# Patient Record
Sex: MALE | Race: WHITE
[De-identification: names, ages, dates, MRNs, and addresses within clinical notes are randomized per-mention and may not be internally consistent; named-entity substitution may affect disease eponyms.]

---

## 2017-11-17 ENCOUNTER — HOSPITAL ENCOUNTER (INPATIENT)
Dept: HOSPITAL 10 - E/R | Age: 52
LOS: 4 days | Discharge: HOME | DRG: 871 | End: 2017-11-21
Attending: INTERNAL MEDICINE | Admitting: INTERNAL MEDICINE
Payer: COMMERCIAL

## 2017-11-17 VITALS — SYSTOLIC BLOOD PRESSURE: 136 MMHG | DIASTOLIC BLOOD PRESSURE: 83 MMHG | RESPIRATION RATE: 18 BRPM | HEART RATE: 112 BPM

## 2017-11-17 VITALS
WEIGHT: 140.3 LBS | HEIGHT: 66 IN | BODY MASS INDEX: 22.55 KG/M2 | BODY MASS INDEX: 22.55 KG/M2 | HEIGHT: 66 IN | WEIGHT: 140.3 LBS

## 2017-11-17 VITALS — DIASTOLIC BLOOD PRESSURE: 86 MMHG | RESPIRATION RATE: 20 BRPM | SYSTOLIC BLOOD PRESSURE: 135 MMHG

## 2017-11-17 VITALS — TEMPERATURE: 99.3 F

## 2017-11-17 VITALS — DIASTOLIC BLOOD PRESSURE: 61 MMHG | RESPIRATION RATE: 20 BRPM | SYSTOLIC BLOOD PRESSURE: 116 MMHG

## 2017-11-17 VITALS — HEART RATE: 107 BPM

## 2017-11-17 VITALS — HEART RATE: 103 BPM

## 2017-11-17 DIAGNOSIS — R11.2: ICD-10-CM

## 2017-11-17 DIAGNOSIS — N20.0: ICD-10-CM

## 2017-11-17 DIAGNOSIS — B97.89: ICD-10-CM

## 2017-11-17 DIAGNOSIS — Z86.19: ICD-10-CM

## 2017-11-17 DIAGNOSIS — A41.89: Primary | ICD-10-CM

## 2017-11-17 DIAGNOSIS — Z79.899: ICD-10-CM

## 2017-11-17 DIAGNOSIS — Z94.0: ICD-10-CM

## 2017-11-17 DIAGNOSIS — E83.42: ICD-10-CM

## 2017-11-17 DIAGNOSIS — N18.9: ICD-10-CM

## 2017-11-17 DIAGNOSIS — F41.9: ICD-10-CM

## 2017-11-17 DIAGNOSIS — N17.9: ICD-10-CM

## 2017-11-17 DIAGNOSIS — K57.90: ICD-10-CM

## 2017-11-17 DIAGNOSIS — J96.01: ICD-10-CM

## 2017-11-17 DIAGNOSIS — R65.20: ICD-10-CM

## 2017-11-17 DIAGNOSIS — Z94.83: ICD-10-CM

## 2017-11-17 DIAGNOSIS — K59.00: ICD-10-CM

## 2017-11-17 DIAGNOSIS — I12.9: ICD-10-CM

## 2017-11-17 DIAGNOSIS — I70.209: ICD-10-CM

## 2017-11-17 DIAGNOSIS — E87.2: ICD-10-CM

## 2017-11-17 LAB
ABNORMAL IP MESSAGE: 1
ADD UMIC: YES
ALBUMIN SERPL-MCNC: 4.6 G/DL (ref 3.3–4.9)
ALBUMIN/GLOB SERPL: 1.64 {RATIO}
ALP SERPL-CCNC: 72 IU/L (ref 42–121)
ALT SERPL-CCNC: 18 IU/L (ref 13–69)
ANION GAP SERPL CALC-SCNC: 26 MMOL/L (ref 8–16)
APTT BLD: 27.1 SEC (ref 25–35)
AST SERPL-CCNC: 24 IU/L (ref 15–46)
BASOPHILS # BLD AUTO: 0.1 10^3/UL (ref 0–0.1)
BASOPHILS NFR BLD: 0.3 % (ref 0–2)
BILIRUB DIRECT SERPL-MCNC: 0 MG/DL (ref 0–0.2)
BILIRUB SERPL-MCNC: 0.6 MG/DL (ref 0.2–1.3)
BUN SERPL-MCNC: 33 MG/DL (ref 7–20)
CALCIUM SERPL-MCNC: 12.1 MG/DL (ref 8.4–10.2)
CHLORIDE SERPL-SCNC: 96 MMOL/L (ref 97–110)
CO2 SERPL-SCNC: 22 MMOL/L (ref 21–31)
COLOR UR: (no result)
CREAT SERPL-MCNC: 3.97 MG/DL (ref 0.61–1.24)
EOSINOPHIL # BLD: 0 10^3/UL (ref 0–0.5)
EOSINOPHIL NFR BLD: 0 % (ref 0–7)
ERYTHROCYTE [DISTWIDTH] IN BLOOD BY AUTOMATED COUNT: 13.4 % (ref 11.5–14.5)
GLOBULIN SER-MCNC: 2.8 G/DL (ref 1.3–3.2)
GLUCOSE SERPL-MCNC: 144 MG/DL (ref 70–220)
GLUCOSE UR STRIP-MCNC: (no result) MG/DL
HCT VFR BLD CALC: 44.7 % (ref 42–52)
HGB BLD-MCNC: 15.8 G/DL (ref 14–18)
INR PPP: 1.18
KETONES UR STRIP.AUTO-MCNC: (no result) MG/DL
LYMPHOCYTES # BLD AUTO: 2.3 10^3/UL (ref 0.8–2.9)
LYMPHOCYTES NFR BLD AUTO: 10.7 % (ref 15–51)
MCH RBC QN AUTO: 32.2 PG (ref 29–33)
MCHC RBC AUTO-ENTMCNC: 35.3 G/DL (ref 32–37)
MCV RBC AUTO: 91 FL (ref 82–101)
MONOCYTES # BLD: 1.9 10^3/UL (ref 0.3–0.9)
MONOCYTES NFR BLD: 8.8 % (ref 0–11)
NEUTROPHILS # BLD: 16.8 10^3/UL (ref 1.6–7.5)
NEUTROPHILS NFR BLD AUTO: 79.7 % (ref 39–77)
NITRITE UR QL STRIP.AUTO: NEGATIVE MG/DL
NRBC # BLD MANUAL: 0 10^3/UL (ref 0–0)
NRBC BLD AUTO-RTO: 0 /100WBC (ref 0–0)
PLATELET # BLD: 287 10^3/UL (ref 140–415)
PMV BLD AUTO: 11.5 FL (ref 7.4–10.4)
POSITIVE DIFF: (no result)
POTASSIUM SERPL-SCNC: 3.8 MMOL/L (ref 3.5–5.1)
PROT SERPL-MCNC: 7.4 G/DL (ref 6.1–8.1)
PROT UR-MCNC: 30 MG/DL (ref 0–11.9)
PROTHROMBIN TIME: 15.1 SEC (ref 12.2–14.2)
PT RATIO: 1.2
RBC # BLD AUTO: 4.91 10^6/UL (ref 4.7–6.1)
RBC # UR AUTO: NEGATIVE MG/DL
SODIUM SERPL-SCNC: 140 MMOL/L (ref 135–144)
TROPONIN I SERPL-MCNC: 0.01 NG/ML (ref 0–0.12)
UR ASCORBIC ACID: NEGATIVE MG/DL
UR BILIRUBIN (DIP): (no result) MG/DL
UR CLARITY: (no result)
UR PH (DIP): 5 (ref 5–9)
UR RBC: 15 /HPF (ref 0–5)
UR SPECIFIC GRAVITY (DIP): 1.02 (ref 1–1.03)
UR TOTAL PROTEIN (DIP): (no result) MG/DL
UROBILINOGEN UR STRIP-ACNC: (no result) MG/DL
WBC # BLD AUTO: 21.1 10^3/UL (ref 4.8–10.8)
WBC # UR STRIP: NEGATIVE LEU/UL

## 2017-11-17 PROCEDURE — 82043 UR ALBUMIN QUANTITATIVE: CPT

## 2017-11-17 PROCEDURE — 84155 ASSAY OF PROTEIN SERUM: CPT

## 2017-11-17 PROCEDURE — 83690 ASSAY OF LIPASE: CPT

## 2017-11-17 PROCEDURE — 83605 ASSAY OF LACTIC ACID: CPT

## 2017-11-17 PROCEDURE — 90686 IIV4 VACC NO PRSV 0.5 ML IM: CPT

## 2017-11-17 PROCEDURE — C9113 INJ PANTOPRAZOLE SODIUM, VIA: HCPCS

## 2017-11-17 PROCEDURE — 85610 PROTHROMBIN TIME: CPT

## 2017-11-17 PROCEDURE — 84300 ASSAY OF URINE SODIUM: CPT

## 2017-11-17 PROCEDURE — 93005 ELECTROCARDIOGRAM TRACING: CPT

## 2017-11-17 PROCEDURE — 36415 COLL VENOUS BLD VENIPUNCTURE: CPT

## 2017-11-17 PROCEDURE — 84443 ASSAY THYROID STIM HORMONE: CPT

## 2017-11-17 PROCEDURE — 80048 BASIC METABOLIC PNL TOTAL CA: CPT

## 2017-11-17 PROCEDURE — 80197 ASSAY OF TACROLIMUS: CPT

## 2017-11-17 PROCEDURE — 80053 COMPREHEN METABOLIC PANEL: CPT

## 2017-11-17 PROCEDURE — 84484 ASSAY OF TROPONIN QUANT: CPT

## 2017-11-17 PROCEDURE — 85025 COMPLETE CBC W/AUTO DIFF WBC: CPT

## 2017-11-17 PROCEDURE — 82550 ASSAY OF CK (CPK): CPT

## 2017-11-17 PROCEDURE — 80061 LIPID PANEL: CPT

## 2017-11-17 PROCEDURE — 84100 ASSAY OF PHOSPHORUS: CPT

## 2017-11-17 PROCEDURE — 87400 INFLUENZA A/B EACH AG IA: CPT

## 2017-11-17 PROCEDURE — 84439 ASSAY OF FREE THYROXINE: CPT

## 2017-11-17 PROCEDURE — 85730 THROMBOPLASTIN TIME PARTIAL: CPT

## 2017-11-17 PROCEDURE — 74176 CT ABD & PELVIS W/O CONTRAST: CPT

## 2017-11-17 PROCEDURE — 81001 URINALYSIS AUTO W/SCOPE: CPT

## 2017-11-17 PROCEDURE — 83735 ASSAY OF MAGNESIUM: CPT

## 2017-11-17 PROCEDURE — 71010: CPT

## 2017-11-17 PROCEDURE — 81003 URINALYSIS AUTO W/O SCOPE: CPT

## 2017-11-17 PROCEDURE — 96374 THER/PROPH/DIAG INJ IV PUSH: CPT

## 2017-11-17 PROCEDURE — 87086 URINE CULTURE/COLONY COUNT: CPT

## 2017-11-17 PROCEDURE — 96375 TX/PRO/DX INJ NEW DRUG ADDON: CPT

## 2017-11-17 PROCEDURE — 84145 PROCALCITONIN (PCT): CPT

## 2017-11-17 PROCEDURE — 93306 TTE W/DOPPLER COMPLETE: CPT

## 2017-11-17 PROCEDURE — 85378 FIBRIN DEGRADE SEMIQUANT: CPT

## 2017-11-17 PROCEDURE — 82553 CREATINE MB FRACTION: CPT

## 2017-11-17 PROCEDURE — 86644 CMV ANTIBODY: CPT

## 2017-11-17 PROCEDURE — 87040 BLOOD CULTURE FOR BACTERIA: CPT

## 2017-11-17 RX ADMIN — CEFEPIME SCH MLS/HR: 1 INJECTION, POWDER, FOR SOLUTION INTRAMUSCULAR; INTRAVENOUS at 21:52

## 2017-11-17 RX ADMIN — FOLIC ACID SCH MLS/HR: 5 INJECTION, SOLUTION INTRAMUSCULAR; INTRAVENOUS; SUBCUTANEOUS at 22:00

## 2017-11-17 RX ADMIN — ALPRAZOLAM PRN MG: 1 TABLET ORAL at 19:54

## 2017-11-17 RX ADMIN — MYCOPHENOLIC ACID SCH MG: 180 TABLET, DELAYED RELEASE ORAL at 22:54

## 2017-11-17 RX ADMIN — TACROLIMUS SCH MG: 1 CAPSULE ORAL at 22:54

## 2017-11-17 NOTE — ERD
ER Documentation


Chief Complaint


Chief Complaint


DIZZINESS, WEAKNESS, & LOSS OF APPETITIE X2 DAYS





HPI


Patient is a 52-year-old male with kidney and pancreas transplant and diabetes 

who presents with dizziness and weakness.  He was brought in by ambulance.  He 

said that he has had the symptoms for the past 2 days.  He almost passed out 

but did not pass out.  He was given normal saline 500 mL bolus and Zofran by 

paramedics.  He had low blood pressure for the paramedics.  He was having whole 

body chills.  He does not know if he had a fever.  He has had nausea and 

vomiting.  He was taking his antirejection medicines until 24 hours ago when he 

could not keep anything down.  He has had cough and decreased urination.  His 

primary doctor is Dr. Freed.





ROS


All systems reviewed and are negative except as per history of present illness.





Medications


Home Meds


Active Scripts


Ondansetron Hcl* (Zofran* ODT) 4 mg -ODT Tab.disper, 4 MG PO Q6 Y for NAUSEA AND

/OR VOMITING, #20 TAB


   Prov:YAMILET RICHARDSON         5/26/16


Lorazepam* (Lorazepam*) 1 Mg Tablet, 1 MG PO Q8H Y for ANXIETY, #20 TAB


   Prov:YAMILET RICHARDSON         5/26/16


Reported Medications


Alprazolam* (Alprazolam*) 1 Mg Tablet, 1 MG PO Q8 Y for ANXIETY, TAB


   5/25/16


Metoprolol Tartrate* (Lopressor*) 50 Mg Tab, 50 MG PO BID, #60 TAB


   5/25/16


Mycophenolate Sodium* (Myfortic*) 360 Mg Tablet., 360 MG PO Q12, TAB


   2/18/15


Tacrolimus* (Tacrolimus*) 1 Mg Capsule, 2 MG PO Q12, CAP


   2/18/15


Prednisone (Prednisone) 5 Mg Tablet, 5 MG PO DAILY


   10/16/11





Allergies


Allergies:  


Coded Allergies:  


     No Known Allergies (Verified  Allergy, Unknown, 5/25/16)





PMhx/Soc


History of Surgery:  Yes (RT KIDNEY & PANCREAS TRANSPLANT 17 YRS AGO)


Anesthesia Reaction:  No


Hx Neurological Disorder:  No


Hx Respiratory Disorders:  No


Hx Cardiac Disorders:  Yes (AFIB)


Hx Psychiatric Problems:  No


Hx Miscellaneous Medical Probl:  Yes (DM)


Hx Alcohol Use:  No


Hx Substance Use:  No


Hx Tobacco Use:  No





FmHx


Family History:  No diabetes





Physical Exam


Vitals





Vital Signs








  Date Time  Temp Pulse Resp B/P Pulse Ox O2 Delivery O2 Flow Rate FiO2


 


11/17/17 11:15 98.1 99 24 118/81 100 Nasal Cannula 2.0 


 


11/17/17 08:40 98.7 104 24 105/75 100   


 


11/17/17 08:40      Nasal Cannula 2 








Physical Exam


Const: Moderate distress with rigors


Head:   Atraumatic 


Eyes:    Normal Conjunctiva


ENT:    Normal External Ears, Nose and Mouth.


Neck:               Full range of motion..~ No meningismus.


Resp:    Clear to auscultation bilaterally


Cardio:    Regular rate and rhythm, no murmurs


Abd:    Soft, non tender, non distended. Normal bowel sounds


Skin:   Pale skin


Back:    No midline or flank tenderness


Ext:    No cyanosis, or edema


Neur:    Awake and alert, patient has full body rigors


Result Diagram:  


11/17/17 0900                                                                  

              11/17/17 0900





Results 24 hrs





 Laboratory Tests








Test


  11/17/17


09:00 11/17/17


11:20


 


White Blood Count 21.110^3/ul  


 


Red Blood Count 4.9110^6/ul  


 


Hemoglobin 15.8g/dl  


 


Hematocrit 44.7%  


 


Mean Corpuscular Volume 91.0fl  


 


Mean Corpuscular Hemoglobin 32.2pg  


 


Mean Corpuscular Hemoglobin


Concent 35.3g/dl 


  


 


 


Red Cell Distribution Width 13.4%  


 


Platelet Count 43705^3/UL  


 


Mean Platelet Volume 11.5fl  


 


Neutrophils % 79.7%  


 


Lymphocytes % 10.7%  


 


Monocytes % 8.8%  


 


Eosinophils % 0.0%  


 


Basophils % 0.3%  


 


Nucleated Red Blood Cells % 0.0/100WBC  


 


Neutrophils # 16.810^3/ul  


 


Lymphocytes # 2.310^3/ul  


 


Monocytes # 1.910^3/ul  


 


Eosinophils # 0.010^3/ul  


 


Basophils # 0.110^3/ul  


 


Nucleated Red Blood Cells # 0.010^3/ul  


 


Sodium Level 140mmol/L  


 


Potassium Level 3.8mmol/L  


 


Chloride Level 96mmol/L  


 


Carbon Dioxide Level 22mmol/L  


 


Anion Gap 26  


 


Blood Urea Nitrogen 33mg/dl  


 


Creatinine 3.97mg/dl  


 


Glucose Level 144mg/dl  


 


Lactic Acid Level 5.0mmol/L  4.8mmol/L 


 


Calcium Level 12.1mg/dl  


 


Total Bilirubin 0.6mg/dl  


 


Direct Bilirubin 0.00mg/dl  


 


Indirect Bilirubin 0.6mg/dl  


 


Aspartate Amino Transf


(AST/SGOT) 24IU/L 


  


 


 


Alanine Aminotransferase


(ALT/SGPT) 18IU/L 


  


 


 


Alkaline Phosphatase 72IU/L  


 


Troponin I 0.012ng/ml  


 


Total Protein 7.4g/dl  


 


Albumin 4.6g/dl  


 


Globulin 2.80g/dl  


 


Albumin/Globulin Ratio 1.64  


 


Lipase 33U/L  


 


Prothrombin Time  15.1Sec 


 


Prothrombin Time Ratio  1.2 


 


INR International Normalized


Ratio 


  1.18 


 


 


Activated Partial


Thromboplast Time 


  27.1Sec 


 








 Current Medications








 Medications


  (Trade)  Dose


 Ordered  Sig/Emily


 Route


 PRN Reason  Start Time


 Stop Time Status Last Admin


Dose Admin


 


 Cefepime HCl  50 ml @ 


 100 mls/hr  ONCE  STAT


 IVPB


   11/17/17 08:40


 11/17/17 09:09 DC 11/17/17 10:00


 


 


 Vancomycin HCl  250 ml @ 


 125 mls/hr  ONCE  ONCE


 IVPB


   11/17/17 09:00


 11/17/17 10:59 DC 11/17/17 10:59


 


 


 Sodium Chloride  1,000 ml @ 


 1,000 mls/hr  Q1H STAT


 IV


   11/17/17 08:40


 11/17/17 09:39 DC 11/17/17 09:49


 


 


 Sodium Chloride


  (NS)  1,000 ml @ 


 1,000 mls/hr  Q1H STAT


 IV


   11/17/17 08:40


 11/17/17 09:39 DC 11/17/17 10:58


 


 


 Ondansetron HCl


  (Zofran Inj)  4 mg  ER BRIDGE PRN


 IV


 NAUSEA AND/OR VOMITING  11/17/17 10:30


 11/18/17 10:29  11/17/17 10:58


 


 


 Acetaminophen


  (Tylenol Tab)  650 mg  ER BRIDGE PRN


 PO


 MILD PAIN/FEVER  11/17/17 10:30


 11/18/17 10:29   


 


 


 Ondansetron HCl


  (Zofran Inj)  4 mg  ONCE  STAT


 IV


   11/17/17 10:30


 11/17/17 10:31 DC 11/17/17 10:53


 


 


 Lorazepam


  (Ativan)  1 mg  ONCE  ONCE


 IV


   11/17/17 11:00


 11/17/17 11:01 DC 11/17/17 10:52


 


 


 Alprazolam


  (Xanax)  1 mg  Q8  PRN


 PO


 ANXIETY  11/17/17 11:30


     


 


 


 Lorazepam


  (Ativan)  1 mg  Q8H  PRN


 PO


 ANXIETY  11/17/17 11:30


   UNV  


 


 


 Metoprolol


 Tartrate


  (Lopressor)  50 mg  BID


 PO


   11/17/17 21:00


 11/17/17 21:00 DC  


 


 


 Mycophenolate


 Sodium


  (Myfortic)  360 mg  Q12


 PO


   11/17/17 21:00


     


 


 


 Ondansetron HCl


  (Zofran Odt)  4 mg  Q6  PRN


 ODT


 NAUSEA AND/OR VOMITING  11/17/17 11:30


     


 


 


 Prednisone


  (Prednisone)  5 mg  DAILY


 PO


   11/18/17 09:00


     


 


 


 Tacrolimus 2 mg  2 mg  Q12


 PO


   11/17/17 21:00


     


 


 


 Sodium Chloride  1,000 ml @ 


 100 mls/hr  Q10H


 IV


   11/17/17 12:00


     


 


 


 Cefepime HCl  50 ml @ 


 100 mls/hr  BID


 IVPB


   11/17/17 21:00


     


 


 


 Sodium Chloride


  (NS)  1,000 ml @ 


 100 mls/hr  Q10H


 IV


   11/17/17 12:00


     


 


 


 Metoclopramide HCl


  (Reglan)  10 mg  STK-MED ONCE


 .ROUTE


   11/17/17 12:45


 11/17/17 12:46 DC  


 


 


 Metoclopramide HCl


  (Reglan)  10 mg  ONCE  ONCE


 IV


   11/17/17 13:00


 11/17/17 13:01 DC  


 


 


 Haloperidol


  (Haldol)  2 mg  ONCE  ONCE


 IV


   11/17/17 13:00


 11/17/17 13:01 DC  


 











Procedures/MDM


EKG read by me: 


Rate/Rhythm: Regular rate and rhythm at a rate of 85


Intervals:    Normal


Impression:     ST depressions diffusely





Chest x-ray negative per radiology.





Admit MDM:


Patient's infectious symptoms have not stabilized and the patient is at risk of 

rapid decompensation.  The patient will be admitted for careful hydration, 

antibiotic therapy, and infectious source control.





Severe Sepsis criteria: 


Infectious source:   Likely cystitis


End organ damage indicated by: 


Lactic acid greater than 2 and creatinine showing acute renal failure





Sepsis Management:


Time of recognition of sepsis: Upon arrival





Within 3 hours of recognition:


Blood cultures x 2 before broad-spectrum antibiotics:   Yes


30 ml/kg NS bolus    Completed


Initial lactate      5.0


Repeat lactate     4.8





Time of recognition of septic shock: 9 AM





Septic Shock Assessment:


Any lactic acid > 4.0   yes


Persistent hypotension (SBP < 90 or 40 mmHg drop, MAP < 65) despite 30 mL/kg IV 

fluid bolus No





Volume Re-assessment for Septic Shock (post 30 ml/kg bolus):


Temp 98.1, /81, HR 99, RR 24, Pox 100% on room air


Heart      Regular rate & rhythm


Lungs      No crackles


Skin      Warm & dry


Cap Refill                   Less than 2 seconds


Peripheral pulses   Radially present





Persistent Hypotension Treatment:


Comfort care   No


Central line   Not Required


Vasopressor started   Not required


I considered further perfusion assessment with CVP measurement, SCVO2, bedside 

ultrasound volume assessment, passive leg raise, trial of further fluid bolus.


And proceeded with 30 ml/kg fluid bolus of NSS, broad spectrum antibiotics, and 

admission.  The patient also has kidney and pancreas transplant and given the 

elevated creatinine I am concerned about potential rejection.  Dr. Freed the 

patient's primary doctor is come to the bedside.  The patient was given fluid 

and broad-spectrum antibiotics and hopefully his creatinine will improve.





Accepting Care Team


Current data and ongoing care discussed.


Admitting Physician:   Dr. Freed the patient's primary doctor


Consultant(s):   None


Outstanding Data:   Culture results





Critical Care:


Critical care time   50 minutes excluding all billable procedures


Emergent fluid management while maintaining close respiratory support.  

Provision of immediate and broad-spectrum antibiotic therapy.  Simultaneous 

assessment for possible sources in order to direct targeted therapy.  

Consideration for invasive and chemical support to prevent cardiopulmonary 

collapse.





Departure


Diagnosis:  


 Primary Impression:  


 Dizziness


 Additional Impressions:  


 Septic shock


 Renal failure


 Renal failure chronicity:  acute  Acute renal failure type:  unspecified  

Qualified Code:  N17.9 - Acute renal failure, unspecified acute renal failure 

type


Condition:  Serious











LIVAN FRANKS MD Nov 17, 2017 13:14

## 2017-11-17 NOTE — HP
DATE OF ADMISSION: 11/17/2017

 

CHIEF COMPLAINT:  Fever, rigors, nausea, vomiting.

 

HISTORY OF PRESENT ILLNESS:  This is a 52-year-old male with a past medical history of simultaneous 
renal and pancreatic transplant in 2000 with a baseline creatinine of 1.3 to 1.4 mg/dL.  The patient
 is followed by myself in my office.  Most recent creatinine was approximately 1.4 mg/dL approximate
ly 2 months ago.  The patient also has a history of CMV virus, a history of cyclic vomiting syndrome
 who presents to [facility] with several days of fevers, rigors, nausea, vomiting.
  The patient stated the symptoms began approximately 4 to 5 days ago when he started having progres
sive weakness, chills.  The patient during this time was also having progressive nausea and vomiting
.  He stated his symptoms did not improve with his usual medical management of Zofran as well as can
nabis.  The patient stated yesterday his symptoms progressively got worse.  He was unable to tolerat
e any p.o. including his immunosuppressive medications.  As a result, the patient was brought into [facility] emergency room for evaluation.  Upon arrival, the patient had laboratory data which showed a whit
e count of 21,000.  The patient had a sodium 140, BUN is 33, creatinine 3.97.  Lactic acid 5.0.  Naomi
st x-ray was obtained which showed no acute pathology.  In the emergency room, the patient was diagn
osed with severe sepsis.  He was started on sepsis protocol.  He was given IV hydration, IV fluids, 
IV antibiotics of vancomycin and Zosyn.  There have been no other acute processes noted.

 

In terms of the patient's renal history, as stated above, the patient has history of simultaneous pa
ncreatic and renal transplant 17 years ago.  The patient's baseline renal function and creatinine is
 around 1.4 mg/dL.  The patient previously did have a complication of CMV virus 1 year postinfectiou
s but that was treated with IV Valcyte without any further recurrence CMV.  The patient's renal func
tion is otherwise stable without any further complications.  There has been no further reports of an
y hemoptysis, hematemesis, hematochezia.

 

PAST MEDICAL HISTORY:  As stated above, history of simultaneous pancreatic and kidney transplant in 
2000 and previous history of acute kidney injury, previous history of diabetes, previous history of 
CMV virus, history of pneumonia, history of hypertension.

 

PAST SURGICAL HISTORY:  Status post pancreatic and kidney transplant.

 

ALLERGIES:  NO KNOWN DRUG ALLERGIES.

 

SOCIAL HISTORY:  Does not drink, smoke or do drugs.

 

FAMILY HISTORY:  Noncontributory.

 

HOME MEDICATIONS:  Include:

1.  Prednisone 5 mg daily.

2.  Prograf 2 mg b.i.d. 

3.  _____ 360 p.o. q. 12 hours.

4.  Aspirin.  

5.  Xanax.

6.  Metoprolol.

 

REVIEW OF SYSTEMS:  A 14-point review of systems was conducted.  Pertinent positives stated in HPI, 
otherwise negative.

 

PHYSICAL EXAMINATION:

VITAL SIGNS:  Blood pressure is 118/81, respirations 24, pulse 99, temperature 98.1.

HEENT:  Head is normocephalic.  Pupils are reactive to light.

NECK:  Supple.

HEART:  Regular rate.

LUNGS:  Show diminished breath sounds at base.

ABDOMEN:  Soft, nontender to palpation.  No rebound or guarding.

EXTREMITIES:  Negative for clubbing, cyanosis, no edema.

DERMATOLOGIC:  No rashes.

MUSCULOSKELETAL:  No joint effusions.

NEUROLOGIC:  No focal deficits.

 

LABORATORY DATA:  Shows sodium 140, potassium _____, chloride 96, BUN 33, creatinine 3.97, calcium 1
2.1.  Lactic acid 5.0, white count 21.1, hemoglobin 15.8 and platelet count 287.

 

IMAGING STUDIES:  As stated in HPI.

 

ASSESSMENT AND PLAN:  This is a 52-year-old male who presents with:

1.  Severe sepsis.  Underlying etiology source is unclear, possibly pneumonic, possibly abdominal, p
ossible viral, that is, flu.  Plan at this point is to check a CT scan of abdomen and pelvis.  The p
atient's chest x-ray shows no acute pathology at this time.  We will follow up blood cultures.  Foll
ow up with influenza titers.  We will continue the patient on broad-spectrum antibiotics with vancom
ycin and Zosyn.  Continue aggressive IV hydration.  Will check lactic acid and procalcitonin level. 
 We will place an ID consult for evaluation and monitor.

2.  Acute respiratory failure.  Etiology is likely due to underlying sepsis.  Patient's chest x-ray 
shows no acute findings.  Continue supplemental oxygen.  Continue nebulizers.  Monitor closely.  Con
 pulmonary consult.

3.  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
inine of 1.4 mg/dL.  Etiology of acute kidney injury is likely secondary to hemodynamics, sepsis.  T
he possibility of acute rejection is always a consideration, although less likely given the acute pr
esentation.  Plan at this point is to check a UA with microanalysis.  We will check urine electrolyt
es.  We will check a renal ultrasound of the patient's allograft.  We will check a Prograf level.  W
e will continue current immunosuppressive regimen, will likely need to adjust Prograf dosing.  We wi
ll continue to treat underlying sepsis.  Continue IV hydration, monitor closely.

4.  Status post simultaneous pancreatic and kidney transplant with a baseline creatinine of 1.4 mg/d
L.  Patient is currently in acute kidney injury as stated above.  We will continue current medical m
anagement.

5.  History of chronic nausea and vomiting.  Continue Zofran.  Continue Ativan p.r.n.

6.  Anxiety disorder.  Continue Xanax.

7.  History of hypertension.  Patient is currently hypotensive.  We will hold beta blocker, monitor.

8.  Gastrointestinal and deep venous thrombosis prophylaxis.  We will give proton pump inhibitor and
 Lovenox.

 

Please note I spent over 25 minutes of face-to-face time with the patient.  The patient is FULL CODE
.

 

 

Dictated By: JACKIE MARIO/BIANCA

DD:    11/17/2017 11:43:55

DT:    11/17/2017 12:20:16

Conf#: [id number]

DID#:  [id number]

## 2017-11-17 NOTE — CONS
DATE OF ADMISSION: 11/17/2017

DATE OF CONSULTATION:  11/17/2017

 

 

 

TYPE OF CONSULTATION:  Infectious Disease.

 

REASON FOR CONSULTATION:  Antibiotic management.

 

HISTORY OF PRESENT ILLNESS:  Jasvir Hopper is a 52-year-old male who comes in with fever, rigors, nause
a and vomiting.  Mr. Hopper is a 52-year-old male with numerous problems who comes in for antibiotic
 management.  His problems include:

1.  History of simultaneous pancreatic and kidney transplant in 2000.

2.  Previous history of acute kidney injury.

3.  Previous history of diabetes mellitus.

4.  History of CMV virus.

5.  History of pneumonia.

6.  Hypertension.

 

Acutely, the patient comes in with a creatinine of 1.3-1.4.  He has a history of cyclic vomiting syn
drome.  He comes in now with fever, rigors, nausea and vomiting which began 4 to 5 days prior to adm
ission.  He stated his symptoms did not improve with Zofran as well as cannabis.  He was unable to t
olerate any oral medication including immunosuppressive medication.  In the emergency room, his whit
e count was elevated at 21,100, hemoglobin 15.8, platelet count 286,000.  BUN and creatinine up to 3
3/3.97.  Lactic acid was 5.0.  Chest x-ray showed no acute pathology.  He was diagnosed with severe 
sepsis, given IV hydration and started on vancomycin and Zosyn.

 

PAST SURGICAL HISTORY:  As outlined.

 

PAST SURGICAL HISTORY:  Status post pancreatic and kidney transplant.

 

FAMILY HISTORY:  Noncontributory.

 

SOCIAL HISTORY:  He does not smoke, drink or abuse drugs.

 

ALLERGIES:  NONE TO PENICILLIN, SULFA OR FOODS.

 

MEDICATIONS:  Include:

1.  Prednisone 5 mg a day.

2.  Prograf 2 mg per day.

 

REVIEW OF SYSTEMS:  Noncontributory.

 

PHYSICAL EXAMINATION:

GENERAL:  The patient is a well-developed, well-nourished male, alert, responsive, in no acute distr
ess.

VITAL SIGNS:  Stable.  He is afebrile.

SKIN:  Without generalized rash.

HEENT:  Within normal limits.

NECK:  Supple.

LYMPH NODES:  None palpable.

CHEST:  Decreased breath sounds at the bases.

HEART:  Without murmur or gallop.

ABDOMEN:  Soft, nontender, without organosplenomegaly or masses.

EXTREMITIES:  Without cyanosis, clubbing, or edema.

RECTAL AND GENITAL:  Exams deferred.

NEUROLOGIC:  No focal neurological abnormalities.

 

IMPRESSION AND PLAN:  The patient comes in with severe sepsis.  A chest x-ray shows no acute disease
.  His lactic acid as noted was 5 and then 4.8.  Liver function tests are within normal limits.  Neno
l have to check his urine, rule out urinary tract infection and a procalcitonin level was ordered an
d ID consult was requested.  

 

I will dictate my findings to Dr. Bell.  I want to thank him for asking us to see this martinez gen
tae in consultation.

 

 

Dictated By: JERROLD DREYER MD JD/BIANCA

DD:    11/17/2017 13:09:44

DT:    11/17/2017 13:59:10

Conf#: [id number]

DID#:  [id number]

CC: JACKIE BELL DO;*EndCC*

## 2017-11-17 NOTE — RADRPT
PROCEDURE:   XR Chest. 

 

CLINICAL INDICATION:    chest pain

 

TECHNIQUE:   Single frontal view of the chest was obtained 

 

COMPARISON:   CR CHEST 5/25/2016 

 

FINDINGS:

The heart and mediastinum are within normal limits.  

The lungs are clear.

There is no pleural effusion or pneumothorax.   

 

RPTAT: AA

 

IMPRESSION:

No acute disease.

_____________________________________________ 

.Cam Gleason MD, MD           Date    Time 

Electronically viewed and signed by .Cam Gleason MD, MD on 11/17/2017 09:26 

 

D:  11/17/2017 09:26  T:  11/17/2017 09:26

.S/

## 2017-11-17 NOTE — CONS
Date/Time of Note


Date/Time of Note


DATE: 11/17/17 


TIME: 12:21





Assessment/Plan


Assessment/Plan


Chief Complaint/Hosp Course


1. abnormal ECG


2. Sepsis


3. lactic acidosis


4. hx renal transplant


5. hx pancreatic transplant


6. hx DM: cured by transplant


7/ hx PAFIB


8. HX HTN





Recommendations:


IV fluid and antibiotic management as per internal medicine.


Cultures to be sent from the emergency room.


Aspirin and beta-blocker to be continued as long as her blood pressure allows 

for his.


We will repeat the cardiac enzymes tomorrow again.  Echocardiogram will be 

checked as well.


Adjustment of antirejection medication as per internal medicine/nephrology team.








Thank you for his referral.  I will continue to follow along with you.





GARIMA DILLON MD [facility]


Problems:  





Consultation Date/Type/Reason


Admit Date/Time





Date of Consultation:  Nov 17, 2017


Type of Consultation:  cardiology


Reason for Consultation


abnormal ECG


Referring Provider:  JACKIE BELL DO





Hx of Present Illness


cc: weakness, N/V








HPI:


Thank you for his referral.  This is a pleasant 52-year-old gentleman with 

history of pancreatic and renal transplant about 17 years ago who presented to 

emergency above complaint.  Patient says over the past year he has been weak 

having nausea vomiting.  Her generalized pain and discomfort including 

bilateral chest wall.  He denies any palpitation to me.  He has not been able 

to eat well.  His EKG has showed marked ST abnormalities which was currently 

asked to evaluate and treat.  His old record was reviewed.  He has had a 

Lexiscan stress test done about 3 years ago at that time he was normal 

perfusion noted.


He had he appears to have severe lactic acidosis and possibly sepsis.





Allergies: No known drug allergies


Medications per medical reconciliation sheet which was personally reviewed and 

includes Toprol and aspirin plus antirejection medications.


Social history he has not smoked.


Family history denies any early coronary artery disease


Review of system as above mentioned he denies all other.





Exam/Review of Systems


Vital Signs


Vitals





 Vital Signs








  Date Time  Temp Pulse Resp B/P Pulse Ox O2 Delivery O2 Flow Rate FiO2


 


11/17/17 11:15 98.1 99 24 118/81 100 Nasal Cannula 2.0 











Exam


General: appears in distress. 


HEENT: NC/AT. pupils are equal. round. 


NECK: NO JVD. no stridor. 


CV: tachycardic. systolic murmur; no gallop or rubs. 


PULM: no wheezing or rhonchi. 


GI: SOFT,  ND, no rebound or guarding 


Extremity: trace B/L LE edema. no clubbing. 


neuro: awake and alert, OX3. 


Psych: calm and pleasant 


rectal: deferred 


Derm: multiple tattoos








ECG reviewed sinus tachy marked ST T abn c/w ant and inf/lat ischemia. 


CXR reviewed. 





lexiscan 4/24/14:


1.  No evidence of perfusion defects or wall motion abnormalities.


2.  The left ventricle ejection fraction at stress is  57%.


 








Results


Result Diagram:  


11/17/17 0900                                                                  

              11/17/17 0900





Results 24 hrs





Laboratory Tests








Test


  11/17/17


09:00 11/17/17


11:20


 


White Blood Count 21.1  H 


 


Red Blood Count 4.91   


 


Hemoglobin 15.8   


 


Hematocrit 44.7   


 


Mean Corpuscular Volume 91.0   


 


Mean Corpuscular Hemoglobin 32.2   


 


Mean Corpuscular Hemoglobin


Concent 35.3  


  


 


 


Red Cell Distribution Width 13.4   


 


Platelet Count 287   


 


Mean Platelet Volume 11.5  #H 


 


Neutrophils % 79.7  H 


 


Lymphocytes % 10.7  L 


 


Monocytes % 8.8   


 


Eosinophils % 0.0   


 


Basophils % 0.3   


 


Nucleated Red Blood Cells % 0.0   


 


Neutrophils # 16.8  H 


 


Lymphocytes # 2.3   


 


Monocytes # 1.9  H 


 


Eosinophils # 0.0   


 


Basophils # 0.1   


 


Nucleated Red Blood Cells # 0.0   


 


Sodium Level 140   


 


Potassium Level 3.8   


 


Chloride Level 96  L 


 


Carbon Dioxide Level 22   


 


Anion Gap 26  H 


 


Blood Urea Nitrogen 33  H 


 


Creatinine 3.97  H 


 


Glucose Level 144   


 


Lactic Acid Level 5.0  *H 4.8  *H


 


Calcium Level 12.1  H 


 


Total Bilirubin 0.6   


 


Direct Bilirubin 0.00   


 


Indirect Bilirubin 0.6   


 


Aspartate Amino Transf


(AST/SGOT) 24  


  


 


 


Alanine Aminotransferase


(ALT/SGPT) 18  


  


 


 


Alkaline Phosphatase 72   


 


Troponin I 0.012   


 


Total Protein 7.4   


 


Albumin 4.6   


 


Globulin 2.80   


 


Albumin/Globulin Ratio 1.64   


 


Lipase 33   


 


Prothrombin Time  15.1  H


 


Prothrombin Time Ratio  1.2  


 


INR International Normalized


Ratio 


  1.18  


 


 


Activated Partial


Thromboplast Time 


  27.1  


 











Medications


Medications





 Current Medications


Alprazolam (Xanax) 1 mg Q8  PRN PO ANXIETY;  Start 11/17/17 at 11:30


Mycophenolate Sodium (Myfortic) 360 mg Q12 PO ;  Start 11/17/17 at 21:00


Ondansetron HCl (Zofran Odt) 4 mg Q6  PRN ODT NAUSEA AND/OR VOMITING;  Start 11/ 17/17 at 11:30


Prednisone (Prednisone) 5 mg DAILY PO ;  Start 11/18/17 at 09:00


Tacrolimus 2 mg 2 mg Q12 PO ;  Start 11/17/17 at 21:00


Sodium Chloride 1,000 ml @  100 mls/hr Q10H IV ;  Start 11/17/17 at 12:00


Cefepime HCl 50 ml @  100 mls/hr BID IVPB ;  Start 11/17/17 at 21:00


Sodium Chloride (NS) 1,000 ml @  100 mls/hr Q10H IV ;  Start 11/17/17 at 12:00











GARIMA DILLON MD Nov 17, 2017 12:29


Medications





 Current Medications


Alprazolam (Xanax) 1 mg Q8  PRN PO ANXIETY;  Start 11/17/17 at 11:30


Mycophenolate Sodium (Myfortic) 360 mg Q12 PO ;  Start 11/17/17 at 21:00


Ondansetron HCl (Zofran Odt) 4 mg Q6  PRN ODT NAUSEA AND/OR VOMITING;  Start 11/ 17/17 at 11:30


Prednisone (Prednisone) 5 mg DAILY PO ;  Start 11/18/17 at 09:00


Tacrolimus 2 mg 2 mg Q12 PO ;  Start 11/17/17 at 21:00


Sodium Chloride 1,000 ml @  100 mls/hr Q10H IV ;  Start 11/17/17 at 12:00


Cefepime HCl 50 ml @  100 mls/hr BID IVPB ;  Start 11/17/17 at 21:00


Sodium Chloride (NS) 1,000 ml @  100 mls/hr Q10H IV ;  Start 11/17/17 at 12:00











GARIMA DILLON MD Nov 17, 2017 12:29

## 2017-11-17 NOTE — RADRPT
PROCEDURE:   CT Abdomen and Pelvis without contrast. 

 

CLINICAL INDICATION:    Lower abdominal pain. 

 

TECHNIQUE:   CT scan of the abdomen and pelvis without contrast was performed on a multidetector hig
h-resolution CT scanner. The patient was scanned without intravenous contrast.  Coronal and sagittal
 reformatted images were obtained from the axial source images. Images were reviewed on a high-resol
wizboo PACS workstation. The total exam CTDI equals 7.32 mGy and the total exam DLP equals 426.57 mGy
-cm.

One or the following dose reduction techniques were used:

-Automated exposure control.

-Adjustment of the mA and/or KV according to patient's size.

-Use of iterative reconstruction technique.

DICOM images are available.

 

COMPARISON:   CT abdomen pelvis 05/26/2016. 

 

FINDINGS:

 

Lung Bases: Unremarkable.

GI:. Unremarkable.

Liver: Unremarkable.

Gallbladder: Unremarkable.

Pancreas: Unremarkable.

Spleen: Unremarkablel

Adrenals: Unremarkable.

Kidneys: Kidneys are severely atrophic with medical cortical thinning and bilateral cysts consistent
 with severe medical renal disease. There are bilateral nonobstructing calculi similar appearance to
 the prior study. There is a pelvic renal allograft present without evidence of urolithiasis or hydr
onephrosis.



Bladder: Unremarkable.

Pelvic Organs: Moderate sized, enlarging scrotal hydrocele.

Skeleton: Normal for age.

Other: Moderate atherosclerotic calcifications.

 

IMPRESSION:

 

1. Small native kidneys with bilateral nonobstructing renal calculi with a pelvic renal allograft wi
thout evidence of urolithiasis or obstructive uropathy.

2.  Scattered colonic diverticulosis without evidence of acute diverticulitis.

3.  Moderate scattered fecal residue suggesting constipation.

4.  Slight increase in size of a scrotal hydrocele.

5.  Atherosclerotic calcifications.

 

 

 

 

RPTAT: AACC

_____________________________________________ 

Physician Denae           Date    Time 

Electronically viewed and signed by Physician Denae on 11/17/2017 16:43 

 

D:  11/17/2017 16:43  T:  11/17/2017 16:43

MARCELLO/

## 2017-11-18 VITALS — HEART RATE: 91 BPM

## 2017-11-18 VITALS — RESPIRATION RATE: 24 BRPM | SYSTOLIC BLOOD PRESSURE: 134 MMHG | DIASTOLIC BLOOD PRESSURE: 82 MMHG

## 2017-11-18 VITALS — RESPIRATION RATE: 20 BRPM | DIASTOLIC BLOOD PRESSURE: 64 MMHG | SYSTOLIC BLOOD PRESSURE: 121 MMHG

## 2017-11-18 VITALS — HEART RATE: 86 BPM

## 2017-11-18 VITALS — HEART RATE: 104 BPM

## 2017-11-18 VITALS — SYSTOLIC BLOOD PRESSURE: 138 MMHG | DIASTOLIC BLOOD PRESSURE: 91 MMHG | RESPIRATION RATE: 20 BRPM

## 2017-11-18 VITALS — RESPIRATION RATE: 20 BRPM | SYSTOLIC BLOOD PRESSURE: 130 MMHG | DIASTOLIC BLOOD PRESSURE: 72 MMHG

## 2017-11-18 VITALS — HEART RATE: 108 BPM

## 2017-11-18 VITALS — HEART RATE: 81 BPM

## 2017-11-18 VITALS — DIASTOLIC BLOOD PRESSURE: 89 MMHG | RESPIRATION RATE: 20 BRPM | SYSTOLIC BLOOD PRESSURE: 179 MMHG

## 2017-11-18 VITALS — HEART RATE: 100 BPM

## 2017-11-18 VITALS — DIASTOLIC BLOOD PRESSURE: 84 MMHG | RESPIRATION RATE: 20 BRPM | SYSTOLIC BLOOD PRESSURE: 145 MMHG

## 2017-11-18 VITALS — HEART RATE: 105 BPM

## 2017-11-18 LAB
ALBUMIN SERPL-MCNC: 3.3 G/DL (ref 3.3–4.9)
ALBUMIN/GLOB SERPL: 1.13 {RATIO}
ALP SERPL-CCNC: 53 IU/L (ref 42–121)
ALT SERPL-CCNC: 27 IU/L (ref 13–69)
ANION GAP SERPL CALC-SCNC: 13 MMOL/L (ref 8–16)
AST SERPL-CCNC: 29 IU/L (ref 15–46)
BASOPHILS # BLD AUTO: 0 10^3/UL (ref 0–0.1)
BASOPHILS NFR BLD: 0.3 % (ref 0–2)
BILIRUB DIRECT SERPL-MCNC: 0 MG/DL (ref 0–0.2)
BILIRUB SERPL-MCNC: 0.7 MG/DL (ref 0.2–1.3)
BUN SERPL-MCNC: 29 MG/DL (ref 7–20)
CALCIUM SERPL-MCNC: 9.7 MG/DL (ref 8.4–10.2)
CHLORIDE SERPL-SCNC: 107 MMOL/L (ref 97–110)
CHOLEST SERPL-MCNC: 130 MG/DL (ref 100–200)
CHOLEST/HDLC SERPL: 3.3 RATIO
CK MB SERPL-MCNC: 0.97 NG/ML (ref 0–2.4)
CK SERPL-CCNC: 107 IU/L (ref 23–200)
CO2 SERPL-SCNC: 26 MMOL/L (ref 21–31)
CREAT SERPL-MCNC: 1.99 MG/DL (ref 0.61–1.24)
EOSINOPHIL # BLD: 0 10^3/UL (ref 0–0.5)
EOSINOPHIL NFR BLD: 0.1 % (ref 0–7)
ERYTHROCYTE [DISTWIDTH] IN BLOOD BY AUTOMATED COUNT: 13.7 % (ref 11.5–14.5)
GLOBULIN SER-MCNC: 2.9 G/DL (ref 1.3–3.2)
GLUCOSE SERPL-MCNC: 98 MG/DL (ref 70–220)
HCT VFR BLD CALC: 41.1 % (ref 42–52)
HDLC SERPL-MCNC: 39 MG/DL (ref 28–71)
HGB BLD-MCNC: 14 G/DL (ref 14–18)
LYMPHOCYTES # BLD AUTO: 1.9 10^3/UL (ref 0.8–2.9)
LYMPHOCYTES NFR BLD AUTO: 12.8 % (ref 15–51)
MAGNESIUM SERPL-MCNC: 1.6 MG/DL (ref 1.7–2.5)
MCH RBC QN AUTO: 31.7 PG (ref 29–33)
MCHC RBC AUTO-ENTMCNC: 34.1 G/DL (ref 32–37)
MCV RBC AUTO: 93 FL (ref 82–101)
MONOCYTES # BLD: 1.4 10^3/UL (ref 0.3–0.9)
MONOCYTES NFR BLD: 9.5 % (ref 0–11)
NEUTROPHILS # BLD: 11.7 10^3/UL (ref 1.6–7.5)
NEUTROPHILS NFR BLD AUTO: 76.8 % (ref 39–77)
NRBC # BLD MANUAL: 0 10^3/UL (ref 0–0)
NRBC BLD AUTO-RTO: 0 /100WBC (ref 0–0)
PHOSPHATE SERPL-MCNC: 3.4 MG/DL (ref 2.5–4.9)
PLATELET # BLD: 232 10^3/UL (ref 140–415)
PMV BLD AUTO: 11.1 FL (ref 7.4–10.4)
POTASSIUM SERPL-SCNC: 3.5 MMOL/L (ref 3.5–5.1)
PROT SERPL-MCNC: 6.2 G/DL (ref 6.1–8.1)
RBC # BLD AUTO: 4.42 10^6/UL (ref 4.7–6.1)
SODIUM SERPL-SCNC: 142 MMOL/L (ref 135–144)
TRIGL SERPL-MCNC: 144 MG/DL (ref 0–149)
TROPONIN I SERPL-MCNC: 0.02 NG/ML (ref 0–0.12)
TSH SERPL-ACNC: 2.21 MIU/L (ref 0.47–4.68)
WBC # BLD AUTO: 15.2 10^3/UL (ref 4.8–10.8)

## 2017-11-18 RX ADMIN — FOLIC ACID SCH MLS/HR: 5 INJECTION, SOLUTION INTRAMUSCULAR; INTRAVENOUS; SUBCUTANEOUS at 08:00

## 2017-11-18 RX ADMIN — MYCOPHENOLIC ACID SCH MG: 180 TABLET, DELAYED RELEASE ORAL at 09:48

## 2017-11-18 RX ADMIN — FOLIC ACID SCH MLS/HR: 5 INJECTION, SOLUTION INTRAMUSCULAR; INTRAVENOUS; SUBCUTANEOUS at 18:00

## 2017-11-18 RX ADMIN — FOLIC ACID SCH MLS/HR: 5 INJECTION, SOLUTION INTRAMUSCULAR; INTRAVENOUS; SUBCUTANEOUS at 05:52

## 2017-11-18 RX ADMIN — CEFEPIME SCH MLS/HR: 1 INJECTION, POWDER, FOR SOLUTION INTRAMUSCULAR; INTRAVENOUS at 09:48

## 2017-11-18 RX ADMIN — ALPRAZOLAM PRN MG: 1 TABLET ORAL at 04:02

## 2017-11-18 RX ADMIN — PANTOPRAZOLE SODIUM SCH MG: 40 INJECTION, POWDER, FOR SOLUTION INTRAVENOUS at 20:29

## 2017-11-18 RX ADMIN — MYCOPHENOLIC ACID SCH MG: 180 TABLET, DELAYED RELEASE ORAL at 20:35

## 2017-11-18 RX ADMIN — ALPRAZOLAM PRN MG: 1 TABLET ORAL at 20:34

## 2017-11-18 RX ADMIN — ALPRAZOLAM PRN MG: 1 TABLET ORAL at 11:47

## 2017-11-18 RX ADMIN — TACROLIMUS SCH MG: 1 CAPSULE ORAL at 20:34

## 2017-11-18 RX ADMIN — TACROLIMUS SCH MG: 1 CAPSULE ORAL at 09:47

## 2017-11-18 RX ADMIN — FOLIC ACID SCH MLS/HR: 5 INJECTION, SOLUTION INTRAMUSCULAR; INTRAVENOUS; SUBCUTANEOUS at 07:48

## 2017-11-18 RX ADMIN — FOLIC ACID SCH MLS/HR: 5 INJECTION, SOLUTION INTRAMUSCULAR; INTRAVENOUS; SUBCUTANEOUS at 07:49

## 2017-11-18 RX ADMIN — METOPROLOL TARTRATE SCH MG: 25 TABLET, FILM COATED ORAL at 20:35

## 2017-11-18 RX ADMIN — CEFEPIME SCH MLS/HR: 1 INJECTION, POWDER, FOR SOLUTION INTRAMUSCULAR; INTRAVENOUS at 20:29

## 2017-11-18 NOTE — PN
Date/Time of Note


Date/Time of Note


DATE: 11/18/17 


TIME: 09:45





Assessment/Plan


VTE Prophylaxis


VTE Prophylaxis Intervention:  other





Lines/Catheters


IV Catheter Type (from [facility]):  Saline Lock


Urinary Cath still in place:  No





Assessment/Plan


Chief Complaint/Hosp Course


This is a 52-year-old male with a past medical history of simultaneous renal 

and pancreatic transplant in 2000 with a baseline creatinine of 1.3 to 1.4 mg/

dL.  Most recent creatinine was approximately 1.4 mg/dL approximately 2 months 

ago.  The patient also has a history of CMV virus, a history of cyclic vomiting 

syndrome who presents to [facility] with several days of 

fevers, rigors, nausea, vomiting.  The patient during this time was also having 

progressive nausea and vomiting.  He stated his symptoms did not improve with 

his usual medical management of Zofran as well as cannabis.  The patient stated 

yesterday his symptoms progressively got worse.  He was unable to tolerate any 

p.o. including his immunosuppressive medications.  As a result, the patient was 

brought into the emergency room for evaluation.  Upon arrival, the patient had 

laboratory data which showed a white count of 21,000.  The patient had a sodium 

140, BUN is 33, creatinine 3.97.  Lactic acid 5.0.  Chest x-ray was obtained 

which showed no acute pathology.  In the emergency room, the patient was 

diagnosed with possible sepsis.  He was started on sepsis protocol.  He was 

given IV hydration, IV fluids, IV antibiotics of vancomycin and Zosyn.  There 

have been no other acute processes noted.


 


In terms of the patient's renal history, as stated above, the patient has 

history of simultaneous pancreatic and renal transplant 17 years ago.  The 

patient's baseline renal function and creatinine is around 1.4 mg/dL.  The 

patient previously did have a complication of CMV virus 1 year postinfectious 

but that was treated with IV Valcyte without any further recurrence CMV.  The 

patient's renal function is otherwise stable without any further complications.

  There has been no further reports of any hemoptysis, hematemesis, 

hematochezia.





his kidney allograft function is improving


good uop


very anxious  


 


REVIEW OF SYSTEMS:  A 14-point review of systems was conducted.  Pertinent 

positives stated in HPI, otherwise negative.


 


PHYSICAL EXAMINATION:


HEENT:  Head is normocephalic.  Pupils are reactive to light.


NECK:  Supple.


HEART:  Regular rate.


LUNGS:  Show diminished breath sounds at base.


ABDOMEN:  Soft, nontender to palpation.  No rebound or guarding.


EXTREMITIES:  Negative for clubbing, cyanosis, no edema.


DERMATOLOGIC:  No rashes.


MUSCULOSKELETAL:  No joint effusions.


NEUROLOGIC:  No focal deficits.


 


 


ASSESSMENT AND PLAN:  This is a 52-year-old male who presents with:


1. possible sepsis in an immunocompromised patient.  Underlying etiology source 

is unclear.   We will follow up blood cultures.    We will continue the patient 

on broad-spectrum antibiotics with vancomycin and Zosyn.  Continue aggressive 

IV hydration.  


2.  Acute respiratory failure.  resolved 


3.  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 

previous baseline creatinine of 1.4 mg/dL.  Etiology of acute kidney injury is 

likely secondary to hemodynamics, sepsis.  improving.  We will continue current 

immunosuppressive regimen, will likely need to adjust Prograf dosing.  


4.  Status post simultaneous pancreatic and kidney transplant with a baseline 

creatinine of 1.4 mg/dL.  Patient is currently in acute kidney injury as stated 

above.  We will continue current medical management.


5.  History of chronic nausea and vomiting.  Continue Zofran.  Continue Ativan 

p.r.n.


6.  Anxiety disorder.  Continue Xanax.


7.  History of hypertension.  Patient is currently hypotensive.  We will hold 

beta blocker, monitor.


8.  Gastrointestinal and deep venous thrombosis prophylaxis.  We will give 

proton pump inhibitor and Lovenox.


Problems:  





Exam/Review of Systems


Vital Signs


Vitals





 Vital Signs








  Date Time  Temp Pulse Resp B/P Pulse Ox O2 Delivery O2 Flow Rate FiO2


 


11/18/17 08:30  86      


 


11/18/17 07:41 98.7  20 130/72 98   


 


11/17/17 20:00      Nasal Cannula 2.0 














 Intake and Output   


 


 11/17/17 11/17/17 11/18/17





 15:00 23:00 07:00


 


Intake Total 2300 ml 500 ml 250 ml


 


Balance 2300 ml 500 ml 250 ml











Results


Result Diagram:  


11/18/17 0748                                                                  

              11/18/17 0748





Results 24 hrs





Laboratory Tests








Test


  11/17/17


11:20 11/17/17


15:05 11/17/17


16:30 11/18/17


07:48


 


Prothrombin Time 15.1  H   


 


Prothrombin Time Ratio 1.2     


 


INR International Normalized


Ratio 1.18  


  


  


  


 


 


Activated Partial


Thromboplast Time 27.1  


  


  


  


 


 


Lactic Acid Level 4.8  *H 1.8    


 


Urine Color   LEXUS   


 


Urine Clarity


  


  


  SLIGHTLY


CLOUDY  A 


 


 


Urine pH   5.0   


 


Urine Specific Gravity   1.021   


 


Urine Ketones   TRACE  A 


 


Urine Nitrite   NEGATIVE   


 


Urine Bilirubin   1+  H 


 


Urine Urobilinogen   1+  H 


 


Urine Leukocyte Esterase   NEGATIVE   


 


Urine Microscopic RBC   15  H 


 


Urine Microscopic WBC   0   


 


Urine Hemoglobin   NEGATIVE   


 


Urine Random Creatinine   333.11   


 


Urine Random Sodium   < 13  L 


 


Urine Glucose   1+  H 


 


Urine Total Protein   30.0  H 


 


White Blood Count    15.2  #H


 


Red Blood Count    4.42  L


 


Hemoglobin    14.0  


 


Hematocrit    41.1  L


 


Mean Corpuscular Volume    93.0  


 


Mean Corpuscular Hemoglobin    31.7  


 


Mean Corpuscular Hemoglobin


Concent 


  


  


  34.1  


 


 


Red Cell Distribution Width    13.7  


 


Platelet Count    232  


 


Mean Platelet Volume    11.1  H


 


Neutrophils %    76.8  


 


Lymphocytes %    12.8  L


 


Monocytes %    9.5  


 


Eosinophils %    0.1  


 


Basophils %    0.3  


 


Nucleated Red Blood Cells %    0.0  


 


Neutrophils #    11.7  H


 


Lymphocytes #    1.9  


 


Monocytes #    1.4  H


 


Eosinophils #    0.0  


 


Basophils #    0.0  


 


Nucleated Red Blood Cells #    0.0  


 


Sodium Level    142  


 


Potassium Level    3.5  


 


Chloride Level    107  #


 


Carbon Dioxide Level    26  


 


Anion Gap    13  #


 


Blood Urea Nitrogen    29  H


 


Creatinine    1.99  #H


 


Glucose Level    98  #


 


Calcium Level    9.7  


 


Phosphorus Level    3.4  


 


Magnesium Level    1.6  L


 


Total Bilirubin    0.7  


 


Direct Bilirubin    0.00  


 


Indirect Bilirubin    0.7  


 


Aspartate Amino Transf


(AST/SGOT) 


  


  


  29  


 


 


Alanine Aminotransferase


(ALT/SGPT) 


  


  


  27  


 


 


Alkaline Phosphatase    53  


 


Total Protein    6.2  #


 


Albumin    3.3  #


 


Globulin    2.90  


 


Albumin/Globulin Ratio    1.13  


 


Triglycerides Level    144  


 


Cholesterol Level    130  


 


LDL Cholesterol, Calculated    62  


 


HDL Cholesterol    39  


 


Cholesterol/HDL Ratio    3.3  


 


Thyroid Stimulating Hormone


(TSH) 


  


  


  2.210  


 














Test


  11/18/17


07:50 


  


  


 


 


Creatine Kinase 107     


 


Creatine Kinase Index 0.9     


 


Creatinine Kinase MB (Mass) 0.97     


 


Troponin I 0.018     











Medications


Medications





 Current Medications


Alprazolam (Xanax) 1 mg Q8  PRN PO ANXIETY Last administered on 11/18/17at 04:02

; Admin Dose 1 MG;  Start 11/17/17 at 11:30


Mycophenolate Sodium (Myfortic) 360 mg Q12 PO  Last administered on 11/17/17at 

22:54; Admin Dose 360 MG;  Start 11/17/17 at 21:00


Ondansetron HCl (Zofran Odt) 4 mg Q6  PRN ODT NAUSEA AND/OR VOMITING;  Start 11/ 17/17 at 11:30


Prednisone (Prednisone) 5 mg DAILY PO ;  Start 11/18/17 at 09:00


Tacrolimus 2 mg 2 mg Q12 PO  Last administered on 11/17/17at 22:54; Admin Dose 

2 MG;  Start 11/17/17 at 21:00


Sodium Chloride 1,000 ml @  100 mls/hr Q10H IV ;  Start 11/17/17 at 12:00


Cefepime HCl 50 ml @  100 mls/hr BID IVPB  Last administered on 11/17/17at 21:52

; Admin Dose 100 MLS/HR;  Start 11/17/17 at 21:00


Sodium Chloride (NS) 1,000 ml @  100 mls/hr Q10H IV  Last administered on 11/18/ 17at 05:52; Admin Dose 100 MLS/HR;  Start 11/17/17 at 12:00











MAXI WINTERS DO Nov 18, 2017 09:48

## 2017-11-18 NOTE — CONS
Date/Time of Note


Date/Time of Note


DATE: 11/18/17 


TIME: 11:54





Assessment/Plan


Assessment/Plan


Chief Complaint/Hosp Course


ID PROGRESS NOTE


CURRENT ABX: DAY #2 =>Vanco x 1 on 11/17 + Cefepime #2


24H INTERVAL SUMMARY


* Slender 53 yo M standing up next to bed holding IV pole reports he soiled 

himself while on his way to [facility], he has been ill w/fevers and weak, feels SOB 

with 100% OS sat on room air, tells me he has a dry cough w/intermittent GERD 

emesis into mouth w/coughing in addition to pain in his chest located right 

paraseptal subclavicular  -- suspected esophagitis w/GERD. 


* CHART REVIEWED: See vitals, labs as per below.


* MICRO REVIEWED: 11/17 BCx (-); Urine Cx (-); (-) Influenza A/B screen 


* 11/18/17 2D ECHO: NL LVF/size- EF 65%, mild cLVH w/STG I DD, Mild mitral 

leaflet/annual ca++/Trace MR, Mild AVR, Mild TR, ~PA 31 mmHg, 





PHYSICAL EXAMINATION:


GENERAL:  VSS, afebrile, mild to moderate distress 


HEENT: Unremarkable 


NECK:  Supple, trach midline


CHEST:  Equal chest rise bilaterally, without dyspnea on observation 


HEART: RRR 


ABDOMEN:  Soft, NT, ND 


EXT: Warm, no edemia


SKIN: No rash, no diaphoresis, (+)Tattoos 





ID ASSESSMENT:


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 

immunosuppressives admit [facility]:


1.  Severe sepsis w/lactic acidosis on admission @ 5 -> 4.8 -> 1.8, 

leukocytosis 21.8 w/increased Neuts%, TMax 99.6, Tachycardia  => work up 

in process 


* Underlying etiology source is unclear => Dx viral syndrome vs pulmonary vs 

esophagitis ? 


* Hx of chronic recurrent nausea/vomiting, vs GERD emesis w/coughing 


* 11/17 BCx (-); Urine Cx (-); (-) Influenza A/B screen 


2.  Acute respiratory failure requiring supplemental O2, now on room air w/

subjective c/o dyspnea without wheeze 


* Patient's chest x-ray shows no acute findings.  Continue supplemental oxygen 

PRN.  Continue nebulizers. 


*  Feels SOB with 100% OS sat on room air 


3.  Atypical chest pain -> w/subjective dyspnea => DDx GERD emesis w/ 

esophagitis ?


* (+)Dry cough w/intermittent GERD emesis into mouth w/coughing in addition to 

pain in his chest located right paraseptal subclavicular  -- suspected 

esophagitis w/GERD?


* Lexiscan MPI 4/24/14:1.  No evidence of perfusion defects or wall motion 

abnormalities. 2.  The left ventricle ejection fraction at stress is  57%.


4. 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 

previous baseline creatinine of 1.4 mg/dL.  


* Etiology of acute kidney injury is likely secondary to hemodynamics, sepsis. 


* The possibility of acute rejection is always a consideration, although less 

likely given the acute presentation.  


5.  Nephrolithiasis = non-obstructing per CT: Small native kidneys with 

bilateral nonobstructing renal calculi with a pelvic renal allograft without 

evidence of urolithiasis or obstructive uropathy.


6.  Scattered colonic diverticulosis without evidence of acute diverticulitis.


7.  Constipation


8.  Peripheral atherosclerotic calcifications.


9.  HTN -> w/hypotension on admission


10. Hx of Atrial fibrillation  ? 


* ECG reviewed sinus tachy marked ST T abn c/w ant and inf/lat ischemia. 


11. Diabetes Mellitus


12.  Scrotal hydrocele.


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 

without any further recurrence CMV


14. Anxiety disorder.  Continue Xanax.


15. IMMUNOCOMPROMISED HOST: 2/2 Diabetes Mellitus + Immunosuppressive meds 


(  )MRSA


(-) Influenza A/B Screen 


ABX ALLERGIES: None to ABX 


INVASIVES: PIV 


CURRENT ABX: DAY #2 =>Vanco x 1 on 11/17 + Cefepime #2


ID RECOMMENDATIONS/PLAN:  


1. Continue current ABX over the weekend


2. ID team consultants will continue to follow





. 





Problems:  





Consultation Date/Type/Reason


Admit Date/Time


Nov 17, 2017 at 10:23


Initial Consult Date


11/17/17


Referring Provider:  JACKIE BELL DO





Exam/Review of Systems


Vital Signs


Vitals





 Vital Signs








  Date Time  Temp Pulse Resp B/P Pulse Ox O2 Delivery O2 Flow Rate FiO2


 


11/18/17 11:08 98.4 106 20 145/84 100   


 


11/18/17 09:20       2.0 


 


11/17/17 20:00      Nasal Cannula  














 Intake and Output   


 


 11/17/17 11/17/17 11/18/17





 15:00 23:00 07:00


 


Intake Total 2300 ml 500 ml 250 ml


 


Balance 2300 ml 500 ml 250 ml











Results


Result Diagram:  


11/18/17 0748                                                                  

              11/18/17 0748





Results 24 hrs





Laboratory Tests








Test


  11/17/17


15:05 11/17/17


16:30 11/18/17


07:48 11/18/17


07:50


 


Lactic Acid Level 1.8     


 


Urine Color  LEXUS    


 


Urine Clarity


  


  SLIGHTLY


CLOUDY  A 


  


 


 


Urine pH  5.0    


 


Urine Specific Gravity  1.021    


 


Urine Ketones  TRACE  A  


 


Urine Nitrite  NEGATIVE    


 


Urine Bilirubin  1+  H  


 


Urine Urobilinogen  1+  H  


 


Urine Leukocyte Esterase  NEGATIVE    


 


Urine Microscopic RBC  15  H  


 


Urine Microscopic WBC  0    


 


Urine Hemoglobin  NEGATIVE    


 


Urine Random Creatinine  333.11    


 


Urine Random Sodium  < 13  L  


 


Urine Glucose  1+  H  


 


Urine Total Protein  30.0  H  


 


White Blood Count   15.2  #H 


 


Red Blood Count   4.42  L 


 


Hemoglobin   14.0   


 


Hematocrit   41.1  L 


 


Mean Corpuscular Volume   93.0   


 


Mean Corpuscular Hemoglobin   31.7   


 


Mean Corpuscular Hemoglobin


Concent 


  


  34.1  


  


 


 


Red Cell Distribution Width   13.7   


 


Platelet Count   232   


 


Mean Platelet Volume   11.1  H 


 


Neutrophils %   76.8   


 


Lymphocytes %   12.8  L 


 


Monocytes %   9.5   


 


Eosinophils %   0.1   


 


Basophils %   0.3   


 


Nucleated Red Blood Cells %   0.0   


 


Neutrophils #   11.7  H 


 


Lymphocytes #   1.9   


 


Monocytes #   1.4  H 


 


Eosinophils #   0.0   


 


Basophils #   0.0   


 


Nucleated Red Blood Cells #   0.0   


 


Sodium Level   142   


 


Potassium Level   3.5   


 


Chloride Level   107  # 


 


Carbon Dioxide Level   26   


 


Anion Gap   13  # 


 


Blood Urea Nitrogen   29  H 


 


Creatinine   1.99  #H 


 


Glucose Level   98  # 


 


Calcium Level   9.7   


 


Phosphorus Level   3.4   


 


Magnesium Level   1.6  L 


 


Total Bilirubin   0.7   


 


Direct Bilirubin   0.00   


 


Indirect Bilirubin   0.7   


 


Aspartate Amino Transf


(AST/SGOT) 


  


  29  


  


 


 


Alanine Aminotransferase


(ALT/SGPT) 


  


  27  


  


 


 


Alkaline Phosphatase   53   


 


Total Protein   6.2  # 


 


Albumin   3.3  # 


 


Globulin   2.90   


 


Albumin/Globulin Ratio   1.13   


 


Triglycerides Level   144   


 


Cholesterol Level   130   


 


LDL Cholesterol, Calculated   62   


 


HDL Cholesterol   39   


 


Cholesterol/HDL Ratio   3.3   


 


Thyroid Stimulating Hormone


(TSH) 


  


  2.210  


  


 


 


Creatine Kinase    107  


 


Creatine Kinase Index    0.9  


 


Creatinine Kinase MB (Mass)    0.97  


 


Troponin I    0.018  











Medications


Medications





 Current Medications


Alprazolam (Xanax) 1 mg Q8  PRN PO ANXIETY Last administered on 11/18/17at 11:47

; Admin Dose 1 MG;  Start 11/17/17 at 11:30


Mycophenolate Sodium (Myfortic) 360 mg Q12 PO  Last administered on 11/18/17at 

09:48; Admin Dose 360 MG;  Start 11/17/17 at 21:00


Ondansetron HCl (Zofran Odt) 4 mg Q6  PRN ODT NAUSEA AND/OR VOMITING Last 

administered on 11/18/17at 10:21; Admin Dose 4 MG;  Start 11/17/17 at 11:30


Prednisone (Prednisone) 5 mg DAILY PO  Last administered on 11/18/17at 09:47; 

Admin Dose 5 MG;  Start 11/18/17 at 09:00


Tacrolimus 2 mg 2 mg Q12 PO  Last administered on 11/18/17at 09:47; Admin Dose 

2 MG;  Start 11/17/17 at 21:00


Sodium Chloride 1,000 ml @  100 mls/hr Q10H IV ;  Start 11/17/17 at 12:00


Cefepime HCl 50 ml @  100 mls/hr BID IVPB  Last administered on 11/18/17at 09:48

; Admin Dose 100 MLS/HR;  Start 11/17/17 at 21:00


Sodium Chloride 1,000 ml @  100 mls/hr Q10H IV  Last administered on 11/18/17at 

05:52; Admin Dose 100 MLS/HR;  Start 11/17/17 at 12:00


Magnesium Sulfate (Magnesium Sulfate 2 Gm/50 ml) 50 ml @ 25 mls/hr ONCE  ONCE 

IVPB  Last administered on 11/18/17at 10:21; Admin Dose 25 MLS/HR;  Start 11/18/ 17 at 10:00;  Stop 11/18/17 at 11:59


Metoprolol Tartrate (Lopressor) 25 mg BID PO ;  Start 11/18/17 at 21:00











SAVANNA GARCIA NP Nov 18, 2017 11:55

## 2017-11-18 NOTE — CONS
Date/Time of Note


Date/Time of Note


DATE: 11/18/17 


TIME: 11:10





Consult Date/Type/Reason


Admit Date/Time


Nov 17, 2017 at 10:23


Initial Consult Date


11/17/17


Type of Consultation:  cardiology


Ordering Provider:  JACKIE BELL DO





Subjective


      cardiology followup note:





S:


Dw/ staff and rhythm was reviewed. 


pt remains in NSR


NO CHEST PAIN


Still c/o sob and being anxious


no palpitations 


O:





General: appears anxious. 


HEENT: NC/AT. pupils are equal. round. 


NECK: NO JVD. no stridor. 


CV: tachycardic. systolic murmur; no gallop or rubs. 


PULM: no wheezing or rhonchi. 


GI: SOFT,  ND, no rebound or guarding 


Extremity: trace B/L LE edema. no clubbing. 


neuro: awake and alert, OX3. 


Psych: calm and pleasant 


rectal: deferred 


Derm: multiple tattoos








ECG reviewed sinus tachy marked ST T abn c/w ant and inf/lat ischemia. 


CXR reviewed. 





lexiscan 4/24/14:


1.  No evidence of perfusion defects or wall motion abnormalities.


2.  The left ventricle ejection fraction at stress is  57%.





Objective





 Vital Signs








  Date Time  Temp Pulse Resp B/P Pulse Ox O2 Delivery O2 Flow Rate FiO2


 


11/18/17 11:08 98.4 106 20 145/84 100   


 


11/17/17 20:00      Nasal Cannula 2.0 














 Intake and Output   


 


 11/17/17 11/17/17 11/18/17





 15:00 23:00 07:00


 


Intake Total 2300 ml 500 ml 250 ml


 


Balance 2300 ml 500 ml 250 ml











Results/Medications


Result Diagram:  


11/18/17 0748                                                                  

              11/18/17 0748





Results 24 hrs





Laboratory Tests








Test


  11/17/17


11:20 11/17/17


15:05 11/17/17


16:30 11/18/17


07:48


 


Prothrombin Time 15.1  H   


 


Prothrombin Time Ratio 1.2     


 


INR International Normalized


Ratio 1.18  


  


  


  


 


 


Activated Partial


Thromboplast Time 27.1  


  


  


  


 


 


Lactic Acid Level 4.8  *H 1.8    


 


Urine Color   LEXUS   


 


Urine Clarity


  


  


  SLIGHTLY


CLOUDY  A 


 


 


Urine pH   5.0   


 


Urine Specific Gravity   1.021   


 


Urine Ketones   TRACE  A 


 


Urine Nitrite   NEGATIVE   


 


Urine Bilirubin   1+  H 


 


Urine Urobilinogen   1+  H 


 


Urine Leukocyte Esterase   NEGATIVE   


 


Urine Microscopic RBC   15  H 


 


Urine Microscopic WBC   0   


 


Urine Hemoglobin   NEGATIVE   


 


Urine Random Creatinine   333.11   


 


Urine Random Sodium   < 13  L 


 


Urine Glucose   1+  H 


 


Urine Total Protein   30.0  H 


 


White Blood Count    15.2  #H


 


Red Blood Count    4.42  L


 


Hemoglobin    14.0  


 


Hematocrit    41.1  L


 


Mean Corpuscular Volume    93.0  


 


Mean Corpuscular Hemoglobin    31.7  


 


Mean Corpuscular Hemoglobin


Concent 


  


  


  34.1  


 


 


Red Cell Distribution Width    13.7  


 


Platelet Count    232  


 


Mean Platelet Volume    11.1  H


 


Neutrophils %    76.8  


 


Lymphocytes %    12.8  L


 


Monocytes %    9.5  


 


Eosinophils %    0.1  


 


Basophils %    0.3  


 


Nucleated Red Blood Cells %    0.0  


 


Neutrophils #    11.7  H


 


Lymphocytes #    1.9  


 


Monocytes #    1.4  H


 


Eosinophils #    0.0  


 


Basophils #    0.0  


 


Nucleated Red Blood Cells #    0.0  


 


Sodium Level    142  


 


Potassium Level    3.5  


 


Chloride Level    107  #


 


Carbon Dioxide Level    26  


 


Anion Gap    13  #


 


Blood Urea Nitrogen    29  H


 


Creatinine    1.99  #H


 


Glucose Level    98  #


 


Calcium Level    9.7  


 


Phosphorus Level    3.4  


 


Magnesium Level    1.6  L


 


Total Bilirubin    0.7  


 


Direct Bilirubin    0.00  


 


Indirect Bilirubin    0.7  


 


Aspartate Amino Transf


(AST/SGOT) 


  


  


  29  


 


 


Alanine Aminotransferase


(ALT/SGPT) 


  


  


  27  


 


 


Alkaline Phosphatase    53  


 


Total Protein    6.2  #


 


Albumin    3.3  #


 


Globulin    2.90  


 


Albumin/Globulin Ratio    1.13  


 


Triglycerides Level    144  


 


Cholesterol Level    130  


 


LDL Cholesterol, Calculated    62  


 


HDL Cholesterol    39  


 


Cholesterol/HDL Ratio    3.3  


 


Thyroid Stimulating Hormone


(TSH) 


  


  


  2.210  


 














Test


  11/18/17


07:50 


  


  


 


 


Creatine Kinase 107     


 


Creatine Kinase Index 0.9     


 


Creatinine Kinase MB (Mass) 0.97     


 


Troponin I 0.018     








Medications





 Current Medications


Alprazolam (Xanax) 1 mg Q8  PRN PO ANXIETY Last administered on 11/18/17at 04:02

; Admin Dose 1 MG;  Start 11/17/17 at 11:30


Mycophenolate Sodium (Myfortic) 360 mg Q12 PO  Last administered on 11/18/17at 

09:48; Admin Dose 360 MG;  Start 11/17/17 at 21:00


Ondansetron HCl (Zofran Odt) 4 mg Q6  PRN ODT NAUSEA AND/OR VOMITING Last 

administered on 11/18/17at 10:21; Admin Dose 4 MG;  Start 11/17/17 at 11:30


Prednisone (Prednisone) 5 mg DAILY PO  Last administered on 11/18/17at 09:47; 

Admin Dose 5 MG;  Start 11/18/17 at 09:00


Tacrolimus 2 mg 2 mg Q12 PO  Last administered on 11/18/17at 09:47; Admin Dose 

2 MG;  Start 11/17/17 at 21:00


Sodium Chloride 1,000 ml @  100 mls/hr Q10H IV ;  Start 11/17/17 at 12:00


Cefepime HCl 50 ml @  100 mls/hr BID IVPB  Last administered on 11/18/17at 09:48

; Admin Dose 100 MLS/HR;  Start 11/17/17 at 21:00


Sodium Chloride 1,000 ml @  100 mls/hr Q10H IV  Last administered on 11/18/17at 

05:52; Admin Dose 100 MLS/HR;  Start 11/17/17 at 12:00


Magnesium Sulfate (Magnesium Sulfate 2 Gm/50 ml) 50 ml @ 25 mls/hr ONCE  ONCE 

IVPB  Last administered on 11/18/17at 10:21; Admin Dose 25 MLS/HR;  Start 11/18/ 17 at 10:00;  Stop 11/18/17 at 11:59





Assessment/Plan


Chief Complaint/Hosp Course


1. abnormal ECG


2. Sepsis


3. lactic acidosis


4. hx renal transplant


5. hx pancreatic transplant


6. hx DM: cured by transplant


7/ hx PAFIB


8. HX HTN


9. dyspnea





Recommendations:


IV fluid and antibiotic management as per internal medicine/ nephroogy team .


Cultures to be followed up


Aspirin


resume low dose beta-blocker  as long as his blood pressure allows for it


 Echocardiogram will be checked as well.


Adjustment of antirejection medication as per internal medicine/nephrology team.








Thank you for his referral.  I will continue to follow along with you.





GARIMA DILLON MD [facility]


Problems:  











GARIMA DILLON MD Nov 18, 2017 11:15

## 2017-11-18 NOTE — RADRPT
Echocardiogram Report

 

Patient Name:  VANESSA MONTGOMERY          Gender:       Male

MRN:           [id number]              Accession #:  [id number]

Birth Date:    1965          Study Date:   17-Nov-2017

Sonographer:   Jae Goss [facility]  Location:     [facility]

 

Ref. Physician: GARIMA GIRALDO

Quality: Adequate

 

Procedures: Transthoracic echocardiogram with complete 2D, M-Mode, and 

doppler examination.

Indications: MV Annuloplasty. Sepsis, a-fib.

 

2D/M Mode                          Doppler

Measurement  Value  Normal Ranges  Measurement    Value  Normal Ranges

LVIDd 2D     4.0    3.5 - 5.6 cm   AV Peak Khris    1.4    m/sec

LVIDs 2D     2.6    2.1 - 4.1 cm   AV Peak PG     8.0    mmHg

FS 2D        33.7   %              LVOT Peak Khris  1.3    m/sec

LVPWd 2D     1.3    0.6 - 1.1 cm   LVOT Peak PG   7.0    mmHg

IVSd 2D      1.3    0.6 - 1.1 cm   MV E Peak Khris  0.5    m/sec

IVS/LVPW 2D  1.0                   MV A Peak Khris  0.8    m/sec

AoR Diam 2D  3.2    2.0 - 3.7 cm   MV E/A         0.6

LA/Ao 2D     1      0 - 1          MV Decel Time  127    msec

EDV 2D       61.6   cm3            MV E/A         0.6

ESV 2D       18.0   cm3            TR Peak Khris    2.7    m/sec

LA Dimen 2D  3.2    2.3 - 4.0 cm   TR Peak PG     28.0   mmHg

RVSP           31.0   mmHg

 

Findings

Left Ventricle: Normal left ventricular systolic function.  Normal left 

ventricular cavity size.  Mild concentric left ventricular hypertrophy.  

Ejection fraction is visually estimated at 65 %.  Tissue Doppler/Mitral 

Doppler indices are consistent with impaired relaxation (Stage I 

diastolic dysfunction).

Right Ventricle: Normal right ventricular size.  Normal right 

ventricular systolic function.

Left Atrium: The left atrium is normal in size.

Right Atrium: The right atrium is normal in size.

Mitral Valve: Mild mitral leaflet calcification.  Mild mitral annular 

calcification.  Trace mitral regurgitation.

Aortic Valve: No significant aortic stenosis.  Aortic cusps appear 

mildly calcified.  Mild aortic valve regurgitation.

Tricuspid Valve: Normal appearance of the tricuspid valve.  Estimated 

peak PA systolic pressure 31 mmHg.  There is mild tricuspid 

regurgitation.

Pericardium: Normal pericardium with no significant pericardial effusion.

Aorta: Normal aortic root.

IVC: Normal size and normal respiratory collapse consistent with normal 

right atrial pressure.

 

Conclusions

1.Normal left ventricular systolic function.  Normal left ventricular 

cavity size.  Mild concentric left ventricular hypertrophy.  Ejection 

fraction is visually estimated at 65 %.  Tissue Doppler/Mitral Doppler 

indices are consistent with impaired relaxation (Stage I diastolic 

dysfunction).

2.The left atrium is normal in size.

3.Mild mitral leaflet calcification.  Mild mitral annular 

calcification.  Trace mitral regurgitation.

4.No significant aortic stenosis.  Aortic cusps appear mildly 

calcified.  Mild aortic valve regurgitation.

5.Normal appearance of the tricuspid valve.  Estimated peak PA systolic 

pressure 31 mmHg.  There is mild tricuspid regurgitation.

 

Electronically Signed By:

Garima Giraldo

18-Nov-2017 11:24:58  -0800

 

Patient Name: VANESSA MONTGOMERY

MRN: [id number]

Study Date: 17-Nov-2017

 

[id number]

## 2017-11-19 VITALS — DIASTOLIC BLOOD PRESSURE: 88 MMHG | SYSTOLIC BLOOD PRESSURE: 137 MMHG | RESPIRATION RATE: 20 BRPM

## 2017-11-19 VITALS — HEART RATE: 95 BPM

## 2017-11-19 VITALS — SYSTOLIC BLOOD PRESSURE: 130 MMHG | RESPIRATION RATE: 16 BRPM | DIASTOLIC BLOOD PRESSURE: 74 MMHG | HEART RATE: 75 BPM

## 2017-11-19 VITALS — DIASTOLIC BLOOD PRESSURE: 72 MMHG | SYSTOLIC BLOOD PRESSURE: 135 MMHG | RESPIRATION RATE: 16 BRPM | HEART RATE: 74 BPM

## 2017-11-19 VITALS — DIASTOLIC BLOOD PRESSURE: 74 MMHG | RESPIRATION RATE: 20 BRPM | SYSTOLIC BLOOD PRESSURE: 121 MMHG

## 2017-11-19 VITALS — HEART RATE: 79 BPM

## 2017-11-19 VITALS — RESPIRATION RATE: 20 BRPM | SYSTOLIC BLOOD PRESSURE: 140 MMHG | DIASTOLIC BLOOD PRESSURE: 89 MMHG

## 2017-11-19 VITALS — HEART RATE: 77 BPM

## 2017-11-19 VITALS — HEART RATE: 87 BPM

## 2017-11-19 VITALS — SYSTOLIC BLOOD PRESSURE: 129 MMHG | RESPIRATION RATE: 20 BRPM | DIASTOLIC BLOOD PRESSURE: 76 MMHG

## 2017-11-19 LAB
ANION GAP SERPL CALC-SCNC: 16 MMOL/L (ref 8–16)
BASOPHILS # BLD AUTO: 0.1 10^3/UL (ref 0–0.1)
BASOPHILS NFR BLD: 0.4 % (ref 0–2)
BUN SERPL-MCNC: 25 MG/DL (ref 7–20)
CALCIUM SERPL-MCNC: 9.2 MG/DL (ref 8.4–10.2)
CHLORIDE SERPL-SCNC: 106 MMOL/L (ref 97–110)
CO2 SERPL-SCNC: 23 MMOL/L (ref 21–31)
CREAT SERPL-MCNC: 1.65 MG/DL (ref 0.61–1.24)
D DIMER PPP FEU-MCNC: 229 NG/ML (ref ?–460)
EOSINOPHIL # BLD: 0.1 10^3/UL (ref 0–0.5)
EOSINOPHIL NFR BLD: 0.5 % (ref 0–7)
ERYTHROCYTE [DISTWIDTH] IN BLOOD BY AUTOMATED COUNT: 13.6 % (ref 11.5–14.5)
GLUCOSE SERPL-MCNC: 102 MG/DL (ref 70–220)
HCT VFR BLD CALC: 39.7 % (ref 42–52)
HGB BLD-MCNC: 13.8 G/DL (ref 14–18)
LYMPHOCYTES # BLD AUTO: 2.2 10^3/UL (ref 0.8–2.9)
LYMPHOCYTES NFR BLD AUTO: 17.1 % (ref 15–51)
MAGNESIUM SERPL-MCNC: 2 MG/DL (ref 1.7–2.5)
MCH RBC QN AUTO: 32.2 PG (ref 29–33)
MCHC RBC AUTO-ENTMCNC: 34.8 G/DL (ref 32–37)
MCV RBC AUTO: 92.5 FL (ref 82–101)
MONOCYTES # BLD: 1.4 10^3/UL (ref 0.3–0.9)
MONOCYTES NFR BLD: 10.5 % (ref 0–11)
NEUTROPHILS # BLD: 9.2 10^3/UL (ref 1.6–7.5)
NEUTROPHILS NFR BLD AUTO: 71.1 % (ref 39–77)
NRBC # BLD MANUAL: 0 10^3/UL (ref 0–0)
NRBC BLD AUTO-RTO: 0 /100WBC (ref 0–0)
PHOSPHATE SERPL-MCNC: 2.9 MG/DL (ref 2.5–4.9)
PLATELET # BLD: 233 10^3/UL (ref 140–415)
PMV BLD AUTO: 11.4 FL (ref 7.4–10.4)
POTASSIUM SERPL-SCNC: 3.4 MMOL/L (ref 3.5–5.1)
RBC # BLD AUTO: 4.29 10^6/UL (ref 4.7–6.1)
SODIUM SERPL-SCNC: 142 MMOL/L (ref 135–144)
WBC # BLD AUTO: 13 10^3/UL (ref 4.8–10.8)

## 2017-11-19 RX ADMIN — MYCOPHENOLIC ACID SCH MG: 180 TABLET, DELAYED RELEASE ORAL at 20:56

## 2017-11-19 RX ADMIN — FOLIC ACID SCH MLS/HR: 5 INJECTION, SOLUTION INTRAMUSCULAR; INTRAVENOUS; SUBCUTANEOUS at 21:02

## 2017-11-19 RX ADMIN — FOLIC ACID SCH MLS/HR: 5 INJECTION, SOLUTION INTRAMUSCULAR; INTRAVENOUS; SUBCUTANEOUS at 06:24

## 2017-11-19 RX ADMIN — CEFEPIME SCH MLS/HR: 1 INJECTION, POWDER, FOR SOLUTION INTRAMUSCULAR; INTRAVENOUS at 08:30

## 2017-11-19 RX ADMIN — LORAZEPAM PRN MG: 1 TABLET ORAL at 08:30

## 2017-11-19 RX ADMIN — ALPRAZOLAM PRN MG: 1 TABLET ORAL at 21:01

## 2017-11-19 RX ADMIN — PANTOPRAZOLE SODIUM SCH MG: 40 INJECTION, POWDER, FOR SOLUTION INTRAVENOUS at 06:23

## 2017-11-19 RX ADMIN — ALPRAZOLAM PRN MG: 1 TABLET ORAL at 06:23

## 2017-11-19 RX ADMIN — CEFEPIME SCH MLS/HR: 1 INJECTION, POWDER, FOR SOLUTION INTRAMUSCULAR; INTRAVENOUS at 20:55

## 2017-11-19 RX ADMIN — CALCIUM CARBONATE SCH MG: 750 TABLET ORAL at 17:41

## 2017-11-19 RX ADMIN — LORAZEPAM PRN MG: 1 TABLET ORAL at 02:38

## 2017-11-19 RX ADMIN — FOLIC ACID SCH MLS/HR: 5 INJECTION, SOLUTION INTRAMUSCULAR; INTRAVENOUS; SUBCUTANEOUS at 14:00

## 2017-11-19 RX ADMIN — METOPROLOL TARTRATE SCH MG: 25 TABLET, FILM COATED ORAL at 20:56

## 2017-11-19 RX ADMIN — FOLIC ACID SCH MLS/HR: 5 INJECTION, SOLUTION INTRAMUSCULAR; INTRAVENOUS; SUBCUTANEOUS at 14:14

## 2017-11-19 RX ADMIN — CALCIUM CARBONATE SCH MG: 750 TABLET ORAL at 12:45

## 2017-11-19 RX ADMIN — TACROLIMUS SCH MG: 1 CAPSULE ORAL at 20:56

## 2017-11-19 RX ADMIN — FOLIC ACID SCH MLS/HR: 5 INJECTION, SOLUTION INTRAMUSCULAR; INTRAVENOUS; SUBCUTANEOUS at 04:00

## 2017-11-19 RX ADMIN — MYCOPHENOLIC ACID SCH MG: 180 TABLET, DELAYED RELEASE ORAL at 08:30

## 2017-11-19 RX ADMIN — TACROLIMUS SCH MG: 1 CAPSULE ORAL at 08:30

## 2017-11-19 RX ADMIN — LORAZEPAM PRN MG: 1 TABLET ORAL at 17:41

## 2017-11-19 RX ADMIN — METOPROLOL TARTRATE SCH MG: 25 TABLET, FILM COATED ORAL at 08:30

## 2017-11-19 NOTE — CONS
Date/Time of Note


Date/Time of Note


DATE: 11/19/17 


TIME: 10:57





Consult Date/Type/Reason


Admit Date/Time


Nov 17, 2017 at 10:23


Initial Consult Date


11/17/17


Ordering Provider:  JACKIE BELL DO





Subjective


      cardiology followup note:





S:


Dw/ staff and rhythm was reviewed. 


pt remains in NSR


NO CHEST PAIN


pt with less sob 


no palpitations 


O:





General: appears less anxious now. 


HEENT: NC/AT. pupils are equal. round. 


NECK: NO JVD. no stridor. 


CV: tachycardic. systolic murmur; no gallop or rubs. 


PULM: no wheezing or rhonchi. 


GI: SOFT,  ND, no rebound or guarding 


Extremity: trace B/L LE edema. no clubbing. 


neuro: awake and alert, OX3. 


Psych: calm and pleasant 


rectal: deferred 


Derm: multiple tattoos








ECG reviewed sinus tachy marked ST T abn c/w ant and inf/lat ischemia. 


CXR reviewed. 





lexiscan 4/24/14:


1.  No evidence of perfusion defects or wall motion abnormalities.


2.  The left ventricle ejection fraction at stress is  57%.





Objective





 Vital Signs








  Date Time  Temp Pulse Resp B/P Pulse Ox O2 Delivery O2 Flow Rate FiO2


 


11/19/17 09:04  77      


 


11/19/17 08:18 98.1  20 129/76 94   


 


11/18/17 20:00      Nasal Cannula 2.0 














 Intake and Output   


 


 11/18/17 11/18/17 11/19/17





 15:00 23:00 07:00


 


Intake Total  800 ml 


 


Balance  800 ml 











Results/Medications


Result Diagram:  


11/19/17 0605                                                                  

              11/19/17 0605





Results 24 hrs





Laboratory Tests








Test


  11/19/17


06:05


 


White Blood Count 13.0  H


 


Red Blood Count 4.29  L


 


Hemoglobin 13.8  L


 


Hematocrit 39.7  L


 


Mean Corpuscular Volume 92.5  


 


Mean Corpuscular Hemoglobin 32.2  


 


Mean Corpuscular Hemoglobin


Concent 34.8  


 


 


Red Cell Distribution Width 13.6  


 


Platelet Count 233  


 


Mean Platelet Volume 11.4  H


 


Neutrophils % 71.1  


 


Lymphocytes % 17.1  


 


Monocytes % 10.5  


 


Eosinophils % 0.5  


 


Basophils % 0.4  


 


Nucleated Red Blood Cells % 0.0  


 


Neutrophils # 9.2  H


 


Lymphocytes # 2.2  


 


Monocytes # 1.4  H


 


Eosinophils # 0.1  


 


Basophils # 0.1  


 


Nucleated Red Blood Cells # 0.0  


 


D-Dimer 229.00  


 


D-Dimer Comment   


 


Sodium Level 142  


 


Potassium Level 3.4  L


 


Chloride Level 106  


 


Carbon Dioxide Level 23  


 


Anion Gap 16  


 


Blood Urea Nitrogen 25  H


 


Creatinine 1.65  H


 


Glucose Level 102  


 


Calcium Level 9.2  


 


Phosphorus Level 2.9  


 


Magnesium Level 2.0  








Medications





 Current Medications


Alprazolam (Xanax) 1 mg Q8  PRN PO ANXIETY Last administered on 11/19/17at 06:23

; Admin Dose 1 MG;  Start 11/17/17 at 11:30


Mycophenolate Sodium (Myfortic) 360 mg Q12 PO  Last administered on 11/19/17at 

08:30; Admin Dose 360 MG;  Start 11/17/17 at 21:00


Ondansetron HCl (Zofran Odt) 4 mg Q6  PRN ODT NAUSEA AND/OR VOMITING Last 

administered on 11/18/17at 10:21; Admin Dose 4 MG;  Start 11/17/17 at 11:30


Prednisone (Prednisone) 5 mg DAILY PO  Last administered on 11/19/17at 08:31; 

Admin Dose 5 MG;  Start 11/18/17 at 09:00


Tacrolimus 2 mg 2 mg Q12 PO  Last administered on 11/19/17at 08:30; Admin Dose 

2 MG;  Start 11/17/17 at 21:00


Cefepime HCl 50 ml @  100 mls/hr BID IVPB  Last administered on 11/19/17at 08:30

; Admin Dose 100 MLS/HR;  Start 11/17/17 at 21:00


Sodium Chloride (NS) 1,000 ml @  100 mls/hr Q10H IV  Last administered on 11/19/ 17at 06:24; Admin Dose 100 MLS/HR;  Start 11/17/17 at 12:00


Metoprolol Tartrate (Lopressor) 25 mg BID PO  Last administered on 11/19/17at 08

:30; Admin Dose 25 MG;  Start 11/18/17 at 21:00


Pantoprazole (Protonix Iv) 40 mg DAILY@06 IV  Last administered on 11/19/17at 06

:23; Admin Dose 40 MG;  Start 11/18/17 at 20:00


Ondansetron HCl (Zofran Inj) 4 mg Q6H  PRN IV NAUSEA AND/OR VOMITING Last 

administered on 11/18/17at 20:29; Admin Dose 4 MG;  Start 11/18/17 at 20:00


Lorazepam 1 mg 1 mg Q6H  PRN PO AGITATION/ANXIETY Last administered on 11/19/ 17at 08:30; Admin Dose 1 MG;  Start 11/18/17 at 20:00


Potassium Chloride/Sodium Chloride (KCl/NS) 1,020 ml @  100 mls/hr [id number] IV ;

  Start 11/19/17 at 11:00





Assessment/Plan


Chief Complaint/Hosp Course


1. abnormal ECG


2. Sepsis


3. lactic acidosis


4. hx renal transplant


5. hx pancreatic transplant


6. hx DM: cured by transplant


7/ hx PAFIB


8. HX HTN


9. dyspnea





Recommendations:


IV fluid and antibiotic management as per internal medicine/ nephroogy team .


Cultures to be followed up


Aspirin


cont beta-blocker  


 Echocardiogram reviewed. 


Adjustment of antirejection medication as per internal medicine/nephrology team.








Thank you for his referral.  I will continue to follow along with you.





GARIMA DILLON MD [facility]


Problems:  











GARIMA DILLON MD Nov 19, 2017 10:59

## 2017-11-19 NOTE — CONS
Date/Time of Note


Date/Time of Note


DATE: 11/19/17 


TIME: 14:50





Assessment/Plan


Assessment/Plan


Chief Complaint/Hosp Course


ID PROGRESS NOTE


CURRENT ABX: DAY #3 =>Vanco iv + Cefepime #2


24H INTERVAL SUMMARY


* A/A/O WBC down, afebrile today, still feels weak, lactic acid normalized 1.8, 

lipase 33 


* Patient describes ongoing sxs of GERD w/coughing acid emesis into his mouth 

associated w/feeling "cold" and orthostatic hypotension => to me this sounds 

like vasovagal symptomatology associated w/GERD/cough/emesis -> Sepsis source 

unclear ? Possible esophagitis vs gastritis ? 


* CHART REVIEWED: See vitals, labs as per below.


* MICRO REVIEWED: 11/17 BCx (-); Urine Cx (-); (-) Influenza A/B screen 


* 11/18/17 2D ECHO: NL LVF/size- EF 65%, mild cLVH w/STG I DD, Mild mitral 

leaflet/annual ca++/Trace MR, Mild AVR, Mild TR, ~PA 31 mmHg, 





PHYSICAL EXAMINATION:


GENERAL:  VSS, afebrile, mild to moderate distress 


HEENT: Unremarkable 


NECK:  Supple, trach midline


CHEST:  Equal chest rise bilaterally, without dyspnea on observation 


HEART: RRR 


ABDOMEN:  Soft, NT, ND 


EXT: Warm, no edemia


SKIN: No rash, no diaphoresis, (+)Tattoos 





ID ASSESSMENT:


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 

immunosuppressives admit [facility]:


1.  Severe sepsis w/lactic acidosis on admission @ 5 -> 4.8 -> 1.8, 

leukocytosis 21.8 w/increased Neuts%, TMax 99.6, Tachycardia  => work up 

in process 


* Underlying etiology source is unclear => Dx viral syndrome vs pulmonary vs 

esophagitis ? 


* Hx of chronic recurrent nausea/vomiting, vs GERD emesis w/coughing 


* 11/17 BCx (-); Urine Cx (-); (-) Influenza A/B screen 


2.  Acute respiratory failure requiring supplemental O2, now on room air w/

subjective c/o dyspnea without wheeze 


* Patient's chest x-ray shows no acute findings.  Continue supplemental oxygen 

PRN.  Continue nebulizers. 


*  Feels SOB with 100% OS sat on room air 


3.  Atypical chest pain -> w/subjective dyspnea => DDx GERD emesis w/ 

esophagitis ?


* (+)Dry cough w/intermittent GERD emesis into mouth w/coughing in addition to 

pain in his chest located right paraseptal subclavicular  -- suspected 

esophagitis w/GERD?


* Lexiscan MPI 4/24/14:1.  No evidence of perfusion defects or wall motion 

abnormalities. 2.  The left ventricle ejection fraction at stress is  57%.


4. 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 

previous baseline creatinine of 1.4 mg/dL.  


* Etiology of acute kidney injury is likely secondary to hemodynamics, sepsis. 


* The possibility of acute rejection is always a consideration, although less 

likely given the acute presentation.  


5.  Nephrolithiasis = non-obstructing 


* per CT: Small native kidneys with bilateral nonobstructing renal calculi with 

a pelvic renal allograft without evidence of urolithiasis or obstructive 

uropathy.


6.  Scattered colonic diverticulosis without evidence of acute diverticulitis.


7.  Constipation


8.  Peripheral atherosclerotic calcifications.


9.  HTN -> w/hypotension on admission


10. Hx of Atrial fibrillation  ? 


* ECG reviewed sinus tachy marked ST T abn c/w ant and inf/lat ischemia. 


11. Diabetes Mellitus


12.  Scrotal hydrocele.


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 

without any further recurrence CMV


14. Anxiety disorder.  Continue Xanax.


15. IMMUNOCOMPROMISED HOST: 2/2 Diabetes Mellitus + Immunosuppressive meds 


(  )MRSA


(-) Influenza A/B Screen 


ABX ALLERGIES: None to ABX 


INVASIVES: PIV 


CURRENT ABX: DAY #3 =>Vanco  + Cefepime


ID RECOMMENDATIONS/PLAN:  


1. Consider GI consult for EGD 


2. Patient describes ongoing sxs of GERD w/coughing acid emesis into his mouth 

associated w/feeling "cold" and


orthostatic hypotension


* => to me this sounds like vasovagal symptomatology associated w/GERD/cough/

emesis 


* => Sepsis source unclear ? Viral syndrome vs possible esophagitis vs 

gastritis ? 


* Check stool for H. Pylori 





. 





Problems:  





Consultation Date/Type/Reason


Admit Date/Time


Nov 17, 2017 at 10:23


Initial Consult Date


11/17/17


Referring Provider:  JACKIE BELL DO





Exam/Review of Systems


Vital Signs


Vitals





 Vital Signs








  Date Time  Temp Pulse Resp B/P Pulse Ox O2 Delivery O2 Flow Rate FiO2


 


11/19/17 12:14  79      


 


11/19/17 11:36 98.0  20 121/74 100   


 


11/18/17 20:00      Nasal Cannula 2.0 














 Intake and Output   


 


 11/18/17 11/18/17 11/19/17





 14:59 22:59 06:59


 


Intake Total  800 ml 


 


Balance  800 ml 











Results


Result Diagram:  


11/19/17 0605 11/19/17 0605





Results 24 hrs





Laboratory Tests








Test


  11/19/17


06:05


 


White Blood Count 13.0  H


 


Red Blood Count 4.29  L


 


Hemoglobin 13.8  L


 


Hematocrit 39.7  L


 


Mean Corpuscular Volume 92.5  


 


Mean Corpuscular Hemoglobin 32.2  


 


Mean Corpuscular Hemoglobin


Concent 34.8  


 


 


Red Cell Distribution Width 13.6  


 


Platelet Count 233  


 


Mean Platelet Volume 11.4  H


 


Neutrophils % 71.1  


 


Lymphocytes % 17.1  


 


Monocytes % 10.5  


 


Eosinophils % 0.5  


 


Basophils % 0.4  


 


Nucleated Red Blood Cells % 0.0  


 


Neutrophils # 9.2  H


 


Lymphocytes # 2.2  


 


Monocytes # 1.4  H


 


Eosinophils # 0.1  


 


Basophils # 0.1  


 


Nucleated Red Blood Cells # 0.0  


 


D-Dimer 229.00  


 


D-Dimer Comment   


 


Sodium Level 142  


 


Potassium Level 3.4  L


 


Chloride Level 106  


 


Carbon Dioxide Level 23  


 


Anion Gap 16  


 


Blood Urea Nitrogen 25  H


 


Creatinine 1.65  H


 


Glucose Level 102  


 


Calcium Level 9.2  


 


Phosphorus Level 2.9  


 


Magnesium Level 2.0  











Medications


Medications





 Current Medications


Alprazolam (Xanax) 1 mg Q8  PRN PO ANXIETY Last administered on 11/19/17at 06:23

; Admin Dose 1 MG;  Start 11/17/17 at 11:30


Mycophenolate Sodium (Myfortic) 360 mg Q12 PO  Last administered on 11/19/17at 

08:30; Admin Dose 360 MG;  Start 11/17/17 at 21:00


Ondansetron HCl (Zofran Odt) 4 mg Q6  PRN ODT NAUSEA AND/OR VOMITING Last 

administered on 11/18/17at 10:21; Admin Dose 4 MG;  Start 11/17/17 at 11:30


Prednisone (Prednisone) 5 mg DAILY PO  Last administered on 11/19/17at 08:31; 

Admin Dose 5 MG;  Start 11/18/17 at 09:00


Tacrolimus 2 mg 2 mg Q12 PO  Last administered on 11/19/17at 08:30; Admin Dose 

2 MG;  Start 11/17/17 at 21:00


Cefepime HCl 50 ml @  100 mls/hr BID IVPB  Last administered on 11/19/17at 08:30

; Admin Dose 100 MLS/HR;  Start 11/17/17 at 21:00


Sodium Chloride (NS) 1,000 ml @  100 mls/hr Q10H IV  Last administered on 11/19/ 17at 06:24; Admin Dose 100 MLS/HR;  Start 11/17/17 at 12:00


Metoprolol Tartrate (Lopressor) 25 mg BID PO  Last administered on 11/19/17at 08

:30; Admin Dose 25 MG;  Start 11/18/17 at 21:00


Pantoprazole (Protonix Iv) 40 mg DAILY@06 IV  Last administered on 11/19/17at 06

:23; Admin Dose 40 MG;  Start 11/18/17 at 20:00


Ondansetron HCl (Zofran Inj) 4 mg Q6H  PRN IV NAUSEA AND/OR VOMITING Last 

administered on 11/18/17at 20:29; Admin Dose 4 MG;  Start 11/18/17 at 20:00


Lorazepam 1 mg 1 mg Q6H  PRN PO AGITATION/ANXIETY Last administered on 11/19/ 17at 08:30; Admin Dose 1 MG;  Start 11/18/17 at 20:00


Potassium Chloride/Sodium Chloride (KCl/NS) 1,020 ml @  100 mls/hr [id number] IV  

Last administered on 11/19/17at 14:14; Admin Dose 100 MLS/HR;  Start 11/19/17 

at 11:00











SAVANNA GARCIA NP Nov 19, 2017 14:59

## 2017-11-19 NOTE — PN
Date/Time of Note


Date/Time of Note


DATE: 11/19/17 


TIME: 09:10





Assessment/Plan


VTE Prophylaxis


VTE Prophylaxis Intervention:  other





Lines/Catheters


IV Catheter Type (from [facility]):  Peripheral IV


Urinary Cath still in place:  No





Assessment/Plan


Chief Complaint/Hosp Course


This is a 52-year-old male with a past medical history of simultaneous renal 

and pancreatic transplant in 2000 with a baseline creatinine of 1.3 to 1.4 mg/

dL.  Most recent creatinine was approximately 1.4 mg/dL approximately 2 months 

ago.  The patient also has a history of CMV virus, a history of cyclic vomiting 

syndrome who presents to [facility] with several days of 

fevers, rigors, nausea, vomiting.  The patient during this time was also having 

progressive nausea and vomiting.  He stated his symptoms did not improve with 

his usual medical management of Zofran as well as cannabis.  The patient stated 

yesterday his symptoms progressively got worse.  He was unable to tolerate any 

p.o. including his immunosuppressive medications.  As a result, the patient was 

brought into the emergency room for evaluation.  Upon arrival, the patient had 

laboratory data which showed a white count of 21,000.  The patient had a sodium 

140, BUN is 33, creatinine 3.97.  Lactic acid 5.0.  Chest x-ray was obtained 

which showed no acute pathology.  In the emergency room, the patient was 

diagnosed with possible sepsis.  He was started on sepsis protocol.  He was 

given IV hydration, IV fluids, IV antibiotics of vancomycin and Zosyn.  There 

have been no other acute processes noted.


 


In terms of the patient's renal history, as stated above, the patient has 

history of simultaneous pancreatic and renal transplant 17 years ago.  The 

patient's baseline renal function and creatinine is around 1.4 mg/dL.  The 

patient previously did have a complication of CMV virus 1 year postinfectious 

but that was treated with IV Valcyte without any further recurrence CMV.  The 

patient's renal function is otherwise stable without any further complications.

  There has been no further reports of any hemoptysis, hematemesis, 

hematochezia.





his kidney allograft function is improving


good uop


less anxious  


 


REVIEW OF SYSTEMS:  A 14-point review of systems was conducted.  Pertinent 

positives stated in HPI, otherwise negative.


 


PHYSICAL EXAMINATION:


HEENT:  Head is normocephalic.  Pupils are reactive to light.


NECK:  Supple.


HEART:  Regular rate.


LUNGS:  Show diminished breath sounds at base.


ABDOMEN:  Soft, nontender to palpation.  No rebound or guarding.


EXTREMITIES:  Negative for clubbing, cyanosis, no edema.


DERMATOLOGIC:  No rashes.


MUSCULOSKELETAL:  No joint effusions.


NEUROLOGIC:  No focal deficits.


 


 


ASSESSMENT AND PLAN:  This is a 52-year-old male who presents with:


1. possible sepsis in an immunocompromised patient.  Underlying etiology source 

is unclear.   We will follow up blood cultures.    We will continue the patient 

on broad-spectrum antibiotics with vancomycin and Zosyn.  Continue aggressive 

IV hydration.  


2.  Acute respiratory failure.  resolved 


3.  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 

previous baseline creatinine of 1.4 mg/dL.  Etiology of acute kidney injury is 

likely secondary to hemodynamics, sepsis.  improving.  We will continue current 

immunosuppressive regimen, will likely need to adjust Prograf dosing.  


4.  Status post simultaneous pancreatic and kidney transplant with a baseline 

creatinine of 1.4 mg/dL.  Patient is currently in acute kidney injury as stated 

above.  We will continue current medical management.


5.  History of chronic nausea and vomiting.  Continue Zofran.  Continue Ativan 

p.r.n.


6.  Anxiety disorder.  Continue Xanax.


7.  History of hypertension.  Patient is currently hypotensive.  We will hold 

beta blocker, monitor.


8.  Gastrointestinal and deep venous thrombosis prophylaxis.  We will give 

proton pump inhibitor and Lovenox.


Problems:  





Exam/Review of Systems


Vital Signs


Vitals





 Vital Signs








  Date Time  Temp Pulse Resp B/P Pulse Ox O2 Delivery O2 Flow Rate FiO2


 


11/19/17 09:04  77      


 


11/19/17 08:18 98.1  20 129/76 94   


 


11/18/17 20:00      Nasal Cannula 2.0 














 Intake and Output   


 


 11/18/17 11/18/17 11/19/17





 14:59 22:59 06:59


 


Intake Total  800 ml 


 


Balance  800 ml 











Results


Result Diagram:  


11/19/17 0605 11/19/17 0605





Results 24 hrs





Laboratory Tests








Test


  11/19/17


06:05


 


White Blood Count 13.0  H


 


Red Blood Count 4.29  L


 


Hemoglobin 13.8  L


 


Hematocrit 39.7  L


 


Mean Corpuscular Volume 92.5  


 


Mean Corpuscular Hemoglobin 32.2  


 


Mean Corpuscular Hemoglobin


Concent 34.8  


 


 


Red Cell Distribution Width 13.6  


 


Platelet Count 233  


 


Mean Platelet Volume 11.4  H


 


Neutrophils % 71.1  


 


Lymphocytes % 17.1  


 


Monocytes % 10.5  


 


Eosinophils % 0.5  


 


Basophils % 0.4  


 


Nucleated Red Blood Cells % 0.0  


 


Neutrophils # 9.2  H


 


Lymphocytes # 2.2  


 


Monocytes # 1.4  H


 


Eosinophils # 0.1  


 


Basophils # 0.1  


 


Nucleated Red Blood Cells # 0.0  


 


D-Dimer 229.00  


 


D-Dimer Comment   


 


Sodium Level 142  


 


Potassium Level 3.4  L


 


Chloride Level 106  


 


Carbon Dioxide Level 23  


 


Anion Gap 16  


 


Blood Urea Nitrogen 25  H


 


Creatinine 1.65  H


 


Glucose Level 102  


 


Calcium Level 9.2  


 


Phosphorus Level 2.9  


 


Magnesium Level 2.0  











Medications


Medications





 Current Medications


Alprazolam (Xanax) 1 mg Q8  PRN PO ANXIETY Last administered on 11/19/17at 06:23

; Admin Dose 1 MG;  Start 11/17/17 at 11:30


Mycophenolate Sodium (Myfortic) 360 mg Q12 PO  Last administered on 11/19/17at 

08:30; Admin Dose 360 MG;  Start 11/17/17 at 21:00


Ondansetron HCl (Zofran Odt) 4 mg Q6  PRN ODT NAUSEA AND/OR VOMITING Last 

administered on 11/18/17at 10:21; Admin Dose 4 MG;  Start 11/17/17 at 11:30


Prednisone (Prednisone) 5 mg DAILY PO  Last administered on 11/19/17at 08:31; 

Admin Dose 5 MG;  Start 11/18/17 at 09:00


Tacrolimus 2 mg 2 mg Q12 PO  Last administered on 11/19/17at 08:30; Admin Dose 

2 MG;  Start 11/17/17 at 21:00


Sodium Chloride 1,000 ml @  100 mls/hr Q10H IV ;  Start 11/17/17 at 12:00


Cefepime HCl 50 ml @  100 mls/hr BID IVPB  Last administered on 11/19/17at 08:30

; Admin Dose 100 MLS/HR;  Start 11/17/17 at 21:00


Sodium Chloride (NS) 1,000 ml @  100 mls/hr Q10H IV  Last administered on 11/19/ 17at 06:24; Admin Dose 100 MLS/HR;  Start 11/17/17 at 12:00


Metoprolol Tartrate (Lopressor) 25 mg BID PO  Last administered on 11/19/17at 08

:30; Admin Dose 25 MG;  Start 11/18/17 at 21:00


Pantoprazole (Protonix Iv) 40 mg DAILY@06 IV  Last administered on 11/19/17at 06

:23; Admin Dose 40 MG;  Start 11/18/17 at 20:00


Ondansetron HCl (Zofran Inj) 4 mg Q6H  PRN IV NAUSEA AND/OR VOMITING Last 

administered on 11/18/17at 20:29; Admin Dose 4 MG;  Start 11/18/17 at 20:00


Lorazepam (Ativan) 1 mg Q6H  PRN PO AGITATION/ANXIETY Last administered on 11/19 /17at 08:30; Admin Dose 1 MG;  Start 11/18/17 at 20:00











MAXI WINTERS DO Nov 19, 2017 09:11

## 2017-11-20 VITALS — SYSTOLIC BLOOD PRESSURE: 128 MMHG | DIASTOLIC BLOOD PRESSURE: 80 MMHG | RESPIRATION RATE: 20 BRPM | HEART RATE: 80 BPM

## 2017-11-20 VITALS — HEART RATE: 93 BPM

## 2017-11-20 VITALS — HEART RATE: 66 BPM

## 2017-11-20 VITALS — RESPIRATION RATE: 20 BRPM | DIASTOLIC BLOOD PRESSURE: 87 MMHG | SYSTOLIC BLOOD PRESSURE: 149 MMHG

## 2017-11-20 VITALS — HEART RATE: 78 BPM

## 2017-11-20 VITALS — DIASTOLIC BLOOD PRESSURE: 83 MMHG | SYSTOLIC BLOOD PRESSURE: 127 MMHG | RESPIRATION RATE: 18 BRPM

## 2017-11-20 VITALS — SYSTOLIC BLOOD PRESSURE: 138 MMHG | DIASTOLIC BLOOD PRESSURE: 89 MMHG | RESPIRATION RATE: 20 BRPM

## 2017-11-20 VITALS — DIASTOLIC BLOOD PRESSURE: 91 MMHG | SYSTOLIC BLOOD PRESSURE: 141 MMHG | RESPIRATION RATE: 18 BRPM

## 2017-11-20 VITALS — HEART RATE: 75 BPM

## 2017-11-20 VITALS — DIASTOLIC BLOOD PRESSURE: 88 MMHG | SYSTOLIC BLOOD PRESSURE: 141 MMHG | RESPIRATION RATE: 18 BRPM

## 2017-11-20 VITALS — HEART RATE: 94 BPM

## 2017-11-20 VITALS — HEART RATE: 74 BPM

## 2017-11-20 LAB
ANION GAP SERPL CALC-SCNC: 12 MMOL/L (ref 8–16)
BASOPHILS # BLD AUTO: 0 10^3/UL (ref 0–0.1)
BASOPHILS NFR BLD: 0.5 % (ref 0–2)
BUN SERPL-MCNC: 17 MG/DL (ref 7–20)
CALCIUM SERPL-MCNC: 9 MG/DL (ref 8.4–10.2)
CHLORIDE SERPL-SCNC: 108 MMOL/L (ref 97–110)
CO2 SERPL-SCNC: 25 MMOL/L (ref 21–31)
CREAT SERPL-MCNC: 1.17 MG/DL (ref 0.61–1.24)
EOSINOPHIL # BLD: 0.1 10^3/UL (ref 0–0.5)
EOSINOPHIL NFR BLD: 1.3 % (ref 0–7)
ERYTHROCYTE [DISTWIDTH] IN BLOOD BY AUTOMATED COUNT: 13.7 % (ref 11.5–14.5)
GLUCOSE SERPL-MCNC: 80 MG/DL (ref 70–220)
HCT VFR BLD CALC: 36.8 % (ref 42–52)
HGB BLD-MCNC: 12.6 G/DL (ref 14–18)
LYMPHOCYTES # BLD AUTO: 1.9 10^3/UL (ref 0.8–2.9)
LYMPHOCYTES NFR BLD AUTO: 22.2 % (ref 15–51)
MAGNESIUM SERPL-MCNC: 1.7 MG/DL (ref 1.7–2.5)
MCH RBC QN AUTO: 32 PG (ref 29–33)
MCHC RBC AUTO-ENTMCNC: 34.2 G/DL (ref 32–37)
MCV RBC AUTO: 93.4 FL (ref 82–101)
MICROALBUMIN UR-MCNC: 3.4 MG/DL
MICROALBUMIN/CREAT UR: 9 MG/G{CREAT} (ref ?–30)
MONOCYTES # BLD: 1 10^3/UL (ref 0.3–0.9)
MONOCYTES NFR BLD: 11.4 % (ref 0–11)
NEUTROPHILS # BLD: 5.4 10^3/UL (ref 1.6–7.5)
NEUTROPHILS NFR BLD AUTO: 64.2 % (ref 39–77)
NRBC # BLD MANUAL: 0 10^3/UL (ref 0–0)
NRBC BLD AUTO-RTO: 0 /100WBC (ref 0–0)
PHOSPHATE SERPL-MCNC: 1.4 MG/DL (ref 2.5–4.9)
PLATELET # BLD: 199 10^3/UL (ref 140–415)
PMV BLD AUTO: 11.3 FL (ref 7.4–10.4)
POTASSIUM SERPL-SCNC: 3.9 MMOL/L (ref 3.5–5.1)
RBC # BLD AUTO: 3.94 10^6/UL (ref 4.7–6.1)
SODIUM SERPL-SCNC: 141 MMOL/L (ref 135–144)
WBC # BLD AUTO: 8.4 10^3/UL (ref 4.8–10.8)

## 2017-11-20 RX ADMIN — CEFEPIME SCH MLS/HR: 1 INJECTION, POWDER, FOR SOLUTION INTRAMUSCULAR; INTRAVENOUS at 20:31

## 2017-11-20 RX ADMIN — CALCIUM CARBONATE SCH MG: 750 TABLET ORAL at 09:17

## 2017-11-20 RX ADMIN — PANTOPRAZOLE SODIUM SCH MG: 40 TABLET, DELAYED RELEASE ORAL at 05:04

## 2017-11-20 RX ADMIN — CEFEPIME SCH MLS/HR: 1 INJECTION, POWDER, FOR SOLUTION INTRAMUSCULAR; INTRAVENOUS at 09:16

## 2017-11-20 RX ADMIN — CALCIUM CARBONATE SCH MG: 750 TABLET ORAL at 12:10

## 2017-11-20 RX ADMIN — TACROLIMUS SCH MG: 1 CAPSULE ORAL at 09:16

## 2017-11-20 RX ADMIN — TACROLIMUS SCH MG: 1 CAPSULE ORAL at 20:35

## 2017-11-20 RX ADMIN — MYCOPHENOLIC ACID SCH MG: 180 TABLET, DELAYED RELEASE ORAL at 09:17

## 2017-11-20 RX ADMIN — ALPRAZOLAM PRN MG: 1 TABLET ORAL at 21:58

## 2017-11-20 RX ADMIN — LORAZEPAM PRN MG: 1 TABLET ORAL at 05:04

## 2017-11-20 RX ADMIN — METOPROLOL TARTRATE SCH MG: 25 TABLET, FILM COATED ORAL at 09:17

## 2017-11-20 RX ADMIN — MYCOPHENOLIC ACID SCH MG: 180 TABLET, DELAYED RELEASE ORAL at 20:35

## 2017-11-20 RX ADMIN — METOPROLOL TARTRATE SCH MG: 50 TABLET, FILM COATED ORAL at 20:34

## 2017-11-20 RX ADMIN — CALCIUM CARBONATE SCH MG: 750 TABLET ORAL at 20:30

## 2017-11-20 RX ADMIN — FOLIC ACID SCH MLS/HR: 5 INJECTION, SOLUTION INTRAMUSCULAR; INTRAVENOUS; SUBCUTANEOUS at 00:12

## 2017-11-20 NOTE — CONS
Date/Time of Note


Date/Time of Note


DATE: 11/20/17 


TIME: 16:40





Consult Date/Type/Reason


Admit Date/Time


Nov 17, 2017 at 10:23


Initial Consult Date


11/17/17


Type of Consultation:  CARDIOLOGY


Ordering Provider:  JACKIE BELL DO





Subjective


      cardiology followup note:





S:


Dw/ staff and rhythm was reviewed. 


pt remains in NSR


NO CHEST PAIN now 


pt with less sob 


no palpitations 


he feels much better and wants to go home soon. 


no more N/V





d/w friends. 


O:





General: appears less anxious now. 


HEENT: NC/AT. pupils are equal. round. 


NECK: NO JVD. no stridor. 


CV: tachycardic. systolic murmur; no gallop or rubs. 


PULM: no wheezing or rhonchi. 


GI: SOFT,  ND, no rebound or guarding 


Extremity: trace B/L LE edema. no clubbing. 


neuro: awake and alert, OX3. 


Psych: calm and pleasant 


rectal: deferred 


Derm: multiple tattoos








ECG reviewed sinus tachy marked ST T abn c/w ant and inf/lat ischemia. 


CXR reviewed. 





lexiscan 4/24/14:


1.  No evidence of perfusion defects or wall motion abnormalities.


2.  The left ventricle ejection fraction at stress is  57%.





Objective





 Vital Signs








  Date Time  Temp Pulse Resp B/P Pulse Ox O2 Delivery O2 Flow Rate FiO2


 


11/20/17 16:15  78      


 


11/20/17 16:14 98.0  18 141/91 93   


 


11/20/17 04:00      Nasal Cannula 2.0 














 Intake and Output   


 


 11/19/17 11/19/17 11/20/17





 15:00 23:00 07:00


 


Intake Total  1050 ml 1400 ml


 


Output Total   950 ml


 


Balance  1050 ml 450 ml











Results/Medications


Result Diagram:  


11/20/17 0556                                                                  

              11/20/17 0556





Results 24 hrs





Laboratory Tests








Test


  11/20/17


05:56


 


White Blood Count 8.4  #


 


Red Blood Count 3.94  L


 


Hemoglobin 12.6  L


 


Hematocrit 36.8  L


 


Mean Corpuscular Volume 93.4  


 


Mean Corpuscular Hemoglobin 32.0  


 


Mean Corpuscular Hemoglobin


Concent 34.2  


 


 


Red Cell Distribution Width 13.7  


 


Platelet Count 199  


 


Mean Platelet Volume 11.3  H


 


Neutrophils % 64.2  


 


Lymphocytes % 22.2  


 


Monocytes % 11.4  H


 


Eosinophils % 1.3  


 


Basophils % 0.5  


 


Nucleated Red Blood Cells % 0.0  


 


Neutrophils # 5.4  


 


Lymphocytes # 1.9  


 


Monocytes # 1.0  H


 


Eosinophils # 0.1  


 


Basophils # 0.0  


 


Nucleated Red Blood Cells # 0.0  


 


Sodium Level 141  


 


Potassium Level 3.9  


 


Chloride Level 108  


 


Carbon Dioxide Level 25  


 


Anion Gap 12  


 


Blood Urea Nitrogen 17  


 


Creatinine 1.17  


 


Glucose Level 80  


 


Calcium Level 9.0  


 


Phosphorus Level 1.4  #L


 


Magnesium Level 1.7  








Medications





 Current Medications


Alprazolam (Xanax) 1 mg Q8  PRN PO ANXIETY Last administered on 11/19/17at 21:01

; Admin Dose 1 MG;  Start 11/17/17 at 11:30


Mycophenolate Sodium (Myfortic) 360 mg Q12 PO  Last administered on 11/20/17at 

09:17; Admin Dose 360 MG;  Start 11/17/17 at 21:00


Ondansetron HCl (Zofran Odt) 4 mg Q6  PRN ODT NAUSEA AND/OR VOMITING Last 

administered on 11/18/17at 10:21; Admin Dose 4 MG;  Start 11/17/17 at 11:30


Prednisone (Prednisone) 5 mg DAILY PO  Last administered on 11/20/17at 09:17; 

Admin Dose 5 MG;  Start 11/18/17 at 09:00


Tacrolimus 2 mg 2 mg Q12 PO  Last administered on 11/20/17at 09:16; Admin Dose 

2 MG;  Start 11/17/17 at 21:00


Cefepime HCl (Maxipime 1gm/50 ml (Pmx)) 50 ml @  100 mls/hr BID IVPB  Last 

administered on 11/20/17at 09:16; Admin Dose 100 MLS/HR;  Start 11/17/17 at 21:

00


Metoprolol Tartrate (Lopressor) 25 mg BID PO  Last administered on 11/20/17at 09

:17; Admin Dose 25 MG;  Start 11/18/17 at 21:00


Ondansetron HCl (Zofran Inj) 4 mg Q6H  PRN IV NAUSEA AND/OR VOMITING Last 

administered on 11/18/17at 20:29; Admin Dose 4 MG;  Start 11/18/17 at 20:00


Lorazepam (Ativan) 1 mg Q6H  PRN PO AGITATION/ANXIETY Last administered on 11/20 /17at 05:04; Admin Dose 1 MG;  Start 11/18/17 at 20:00


Pantoprazole (Protonix Tab) 40 mg DAILY@06 PO  Last administered on 11/20/17at 

05:04; Admin Dose 40 MG;  Start 11/20/17 at 06:00


Acetaminophen (Tylenol Tab) 650 mg Q6H  PRN PO PAIN AND OR ELEVATED TEMP Last 

administered on 11/20/17at 00:10; Admin Dose 650 MG;  Start 11/19/17 at 23:30





Assessment/Plan


Chief Complaint/Hosp Course


1. abnormal ECG: R/O CAD 


2. Sepsis


3. lactic acidosis


4. hx renal transplant


5. hx pancreatic transplant


6. hx DM: cured by transplant


7/ hx PAFIB


8. HX HTN


9. dyspnea





Recommendations:


IV fluid and antibiotic management as per internal medicine/ nephroogy team .


Cultures to be followed up


Aspirin


INC beta-blocker  


 Echocardiogram reviewed. 


Adjustment of antirejection medication as per internal medicine/nephrology team.


will schedule for lexiscan tomorrow to r/o underlying ischemia. 








Thank you for his referral.  I will continue to follow along with you.





GARIMA DILLON MD [facility]


Problems:  











GARIMA DILLON MD Nov 20, 2017 16:47

## 2017-11-20 NOTE — PN
DATE:  11/20/2017

 

 

SUBJECTIVE:  The patient is stable.  No events overnight.  No fevers, chills, nausea, vomiting.  The
 patient is requesting to go home.

 

OBJECTIVE:

VITAL SIGNS:  Blood pressure is 141/88, respiration 18, pulse 78, temperature 98.0.

HEENT:  Head is normocephalic.

NECK:  Supple.

HEART:  Regular rate.

LUNGS:  Show diminished breath sounds at base.

ABDOMEN:  Soft, nontender to palpation.  No rebound or guarding.

EXTREMITIES:  Negative for clubbing, cyanosis, no edema.

DERMATOLOGIC:  No rashes.

MUSCULOSKELETAL:  No joint effusions.

NEUROLOGIC:  No change in exam.

 

MEDICATIONS:  The patient's medications have been reviewed.

 

LABORATORY DATA:  Shows white count 8.4, hemoglobin 10.6, platelet count is 139.  Sodium 149, potass
ium 3.9, BUN 17, creatinine 1.17, phosphorus is 1.4.

 

ASSESSMENT AND PLAN:

1.  Sepsis, underlying source is unclear.  The patient has clinically improved with IV antibiotics. 
 The patient's blood cultures have been negative to date.  The patient's white count has normalized.
  At this point, continue current treatment plan.  We will discuss with infectious disease about tra
nsitioning patient to oral antibiotics and possible discharge home.

2.  Acute respiratory failure secondary to sepsis, resolved.

3.  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
inine 1.4 mg/dL.  Etiology of acute kidney injury is secondary to hemodynamics.  Renal function is c
urrently back to previous baseline.

4.  Status post simultaneous pancreatic and kidney transplant with excellent allograft function.  Th
e patient's renal function has returned back to baseline.  Continue to monitor.

5.  History of nausea and vomiting.  Continue Zofran.

6.  Anxiety disorder.  Continue Xanax.

7.  Hypertension.  Continue current blood pressure regimen.

8.  Gastrointestinal and deep venous thrombosis prophylaxis.  Continue proton pump inhibitor and Lov
enox.

 

 

Dictated By: JACKIE MARIO/BIANCA

DD:    11/20/2017 09:09:25

DT:    11/20/2017 10:36:30

Conf#: [id number]

DID#:  [id number]

## 2017-11-21 VITALS — RESPIRATION RATE: 19 BRPM | DIASTOLIC BLOOD PRESSURE: 69 MMHG | SYSTOLIC BLOOD PRESSURE: 131 MMHG

## 2017-11-21 VITALS — SYSTOLIC BLOOD PRESSURE: 134 MMHG | HEART RATE: 88 BPM | RESPIRATION RATE: 20 BRPM | DIASTOLIC BLOOD PRESSURE: 81 MMHG

## 2017-11-21 VITALS — DIASTOLIC BLOOD PRESSURE: 101 MMHG | SYSTOLIC BLOOD PRESSURE: 166 MMHG | RESPIRATION RATE: 18 BRPM

## 2017-11-21 LAB
ALBUMIN SERPL-MCNC: 3.3 G/DL (ref 3.3–4.9)
ALBUMIN/GLOB SERPL: 1.13 {RATIO}
ALP SERPL-CCNC: 53 IU/L (ref 42–121)
ALT SERPL-CCNC: 33 IU/L (ref 13–69)
ANION GAP SERPL CALC-SCNC: 12 MMOL/L (ref 8–16)
AST SERPL-CCNC: 24 IU/L (ref 15–46)
BASOPHILS # BLD AUTO: 0.1 10^3/UL (ref 0–0.1)
BASOPHILS NFR BLD: 0.8 % (ref 0–2)
BILIRUB DIRECT SERPL-MCNC: 0 MG/DL (ref 0–0.2)
BILIRUB SERPL-MCNC: 0.7 MG/DL (ref 0.2–1.3)
BUN SERPL-MCNC: 16 MG/DL (ref 7–20)
CALCIUM SERPL-MCNC: 9.2 MG/DL (ref 8.4–10.2)
CHLORIDE SERPL-SCNC: 106 MMOL/L (ref 97–110)
CK MB SERPL-MCNC: 0.73 NG/ML (ref 0–2.4)
CK SERPL-CCNC: 62 IU/L (ref 23–200)
CMV IGG SERPL IA-ACNC: 7.6 U/ML
CMV IGM SERPL IA-ACNC: <30 AU/ML
CO2 SERPL-SCNC: 29 MMOL/L (ref 21–31)
CREAT SERPL-MCNC: 1.04 MG/DL (ref 0.61–1.24)
EOSINOPHIL # BLD: 0.1 10^3/UL (ref 0–0.5)
EOSINOPHIL NFR BLD: 1.5 % (ref 0–7)
ERYTHROCYTE [DISTWIDTH] IN BLOOD BY AUTOMATED COUNT: 13.2 % (ref 11.5–14.5)
GLOBULIN SER-MCNC: 2.9 G/DL (ref 1.3–3.2)
GLUCOSE SERPL-MCNC: 87 MG/DL (ref 70–220)
HCT VFR BLD CALC: 37.3 % (ref 42–52)
HGB BLD-MCNC: 12.9 G/DL (ref 14–18)
LYMPHOCYTES # BLD AUTO: 2 10^3/UL (ref 0.8–2.9)
LYMPHOCYTES NFR BLD AUTO: 24.7 % (ref 15–51)
MAGNESIUM SERPL-MCNC: 1.9 MG/DL (ref 1.7–2.5)
MCH RBC QN AUTO: 31.9 PG (ref 29–33)
MCHC RBC AUTO-ENTMCNC: 34.6 G/DL (ref 32–37)
MCV RBC AUTO: 92.3 FL (ref 82–101)
MONOCYTES # BLD: 0.8 10^3/UL (ref 0.3–0.9)
MONOCYTES NFR BLD: 9.9 % (ref 0–11)
NEUTROPHILS # BLD: 5 10^3/UL (ref 1.6–7.5)
NEUTROPHILS NFR BLD AUTO: 62.8 % (ref 39–77)
NRBC # BLD MANUAL: 0 10^3/UL (ref 0–0)
NRBC BLD AUTO-RTO: 0 /100WBC (ref 0–0)
PLATELET # BLD: 202 10^3/UL (ref 140–415)
PMV BLD AUTO: 11.2 FL (ref 7.4–10.4)
POTASSIUM SERPL-SCNC: 3.5 MMOL/L (ref 3.5–5.1)
PROT SERPL-MCNC: 6.2 G/DL (ref 6.1–8.1)
RBC # BLD AUTO: 4.04 10^6/UL (ref 4.7–6.1)
SODIUM SERPL-SCNC: 143 MMOL/L (ref 135–144)
TROPONIN I SERPL-MCNC: < 0.012 NG/ML (ref 0–0.12)
WBC # BLD AUTO: 8 10^3/UL (ref 4.8–10.8)

## 2017-11-21 RX ADMIN — METOPROLOL TARTRATE SCH MG: 50 TABLET, FILM COATED ORAL at 08:39

## 2017-11-21 RX ADMIN — CEFEPIME SCH MLS/HR: 1 INJECTION, POWDER, FOR SOLUTION INTRAMUSCULAR; INTRAVENOUS at 08:40

## 2017-11-21 RX ADMIN — MYCOPHENOLIC ACID SCH MG: 180 TABLET, DELAYED RELEASE ORAL at 08:39

## 2017-11-21 RX ADMIN — TACROLIMUS SCH MG: 1 CAPSULE ORAL at 08:39

## 2017-11-21 RX ADMIN — PANTOPRAZOLE SODIUM SCH MG: 40 TABLET, DELAYED RELEASE ORAL at 05:25

## 2017-11-21 RX ADMIN — LORAZEPAM PRN MG: 1 TABLET ORAL at 00:43

## 2017-11-21 RX ADMIN — CALCIUM CARBONATE SCH MG: 750 TABLET ORAL at 08:48

## 2017-11-21 NOTE — DS
DATE OF ADMISSION: 11/17/2017

DATE OF DISCHARGE: 11/21/2017

 

HOSPITAL COURSE:  This is a 52-year-old male with a past medical history of simultaneous renal and p
ancreatic transplant in 2001 with baseline creatinine 1.3 to 1.4 mg/dL.  Patient is followed by nichelle bishop in office.  The patient presented to [facility] with several days of rigors, na
usea and vomiting.  Upon arrival, patient was noted to be in acute kidney injury with a creatinine o
f 4.0 mg/dL.  The patient had a white count of 21,000 and elevated lactic acid.  The patient was vasu
roberto on septic protocol, was placed on broad spectrum antibiotics, IV fluids and admitted to [facility].  The patient was also seen by infectious disease, Dr. Dreyer.  In terms of the patient's sepsis, 
underlying source is unclear, possibly viral.  The patient's chest x-ray, blood cultures were negati
ve.  The patient clinically responded to IV antibiotics and to IV fluids as the patient's acute kidn
ey injury resolved and his white count normalized.  Patient currently is stable at baseline without 
any complications.  The patient also during the hospital course, was evaluated by cardiologist, Dr. Giraldo, who recommended a stress test.  The patient would like to perform that as an outpatient sett
ing and will follow up with Dr. Giraldo in his clinic.  The patient's other medical problems during t
 hospital course included nausea, vomiting, anxiety disorder which is improved.  Currently, at [facility]
s time, the patient is stable, in no acute distress.  He will be discharged home with home health 
dariel.  The patient will also follow up with me in my office in 1 to 2 weeks.

 

FINAL DIAGNOSES:

1.  Sepsis, underlying source unclear, possible viral.  The patient has completed 5 days of IV antib
iotics and is currently asymptomatic.  We will be discharged home off antibiotics, will be monitored
 closely with home health services.

2.  Acute respiratory failure secondary to sepsis, resolved.

3.  Nonoliguric acute kidney injury on top of chronic allograft failure.  Etiology of acute kidney i
njury is secondary to hemodynamics.  Renal function is back to baseline.

4.  Chronic allograft failure.

5.  Status post simultaneous pancreatic kidney transplant.

6.  History of nausea and vomiting, resolved.

7.  Anxiety disorder.

8.  Hypertension.

9.  Hypomagnesemia, resolved.

 

FINAL MEDICATIONS:  Please see reconciliation list.  

 

Please note I spent over 40 minutes in plan of care for patient's discharge.

 

 

Dictated By: JACKIE MARIO/BIANCA

DD:    11/21/2017 08:54:33

DT:    11/21/2017 09:58:44

Conf#: [id number]

DID#:  [id number]

## 2017-11-21 NOTE — PN
DATE:  11/20/2017

 

 

SUBJECTIVE:  No acute changes.  The patient is alert, feels good.  Denies pain, discomfort, no fever
s.

 

WBC today 8.4, no shift, no bands.  BUN 17, creatinine 1.17.

 

MICROBIOLOGY:  All cultures have been negative.

 

DIAGNOSTICS:  Chest x-ray since admission revealed no acute disease.  CT of the abdomen and pelvis s
sapna admission and this revealed no evidence of  lithiasis or obstructive uropathy.  Scattered colon
ic diverticulosis without evidence of acute diverticulitis.  Moderate scattered cecal residue sugges
ting constipation, hydrocele and atherosclerotic calcification.

 

ANTIMICROBIALS:  The patient is on cefepime day #4.

 

PHYSICAL EXAMINATION:

GENERAL:  This is a well-nourished, well-developed 52-year-old white male, who is alert, in no distr
ess.

HEENT:  Head atraumatic, normocephalic.  Sclerae anicteric.  Buccal mucosa dry.

NECK:  Supple.

CHEST:  Rise symmetrical.  Breath sounds diminished to bases.

HEART:  S1, S2.

ABDOMEN:  Soft, bowel sounds present.

EXTREMITIES:  Without cyanosis or edema.

 

ASSESSMENT:

1.  Resolving sepsis, etiology unclear.

2.  Status post acute respiratory failure.

3.  Resolving acute kidney insufficiency.

4.  History of pancreatic and kidney transplant, remains on immunosuppressive therapy.

5.  Hypertension.

6.  Anxiety disorder.

 

PLAN:  The patient remains stable, overall improving.  Again, he is on cefepime, day #4.  We are goi
ng to keep him on antibiotics overnight, and if he remains stable and afebrile, we will discontinue 
antibiotics tomorrow and observe him.

 

 

Dictated By: BETH CHINCHILLA NP for JERROLD DREYER MD NI/BIANCA

DD:    11/20/2017 15:07:58

DT:    11/20/2017 18:43:24

Conf#: [id number]

DID#:  [id number]

CC: JACKIE BELL DO;*EndCC*

## 2018-02-21 ENCOUNTER — HOSPITAL ENCOUNTER (INPATIENT)
Dept: HOSPITAL 91 - E/R | Age: 53
LOS: 3 days | Discharge: HOME | DRG: 872 | End: 2018-02-24
Payer: MEDICARE

## 2018-02-21 ENCOUNTER — HOSPITAL ENCOUNTER (INPATIENT)
Age: 53
LOS: 3 days | Discharge: HOME | DRG: 872 | End: 2018-02-24

## 2018-02-21 DIAGNOSIS — K29.70: ICD-10-CM

## 2018-02-21 DIAGNOSIS — T86.12: ICD-10-CM

## 2018-02-21 DIAGNOSIS — K20.9: ICD-10-CM

## 2018-02-21 DIAGNOSIS — N17.9: ICD-10-CM

## 2018-02-21 DIAGNOSIS — A41.9: Primary | ICD-10-CM

## 2018-02-21 DIAGNOSIS — R65.10: ICD-10-CM

## 2018-02-21 DIAGNOSIS — E86.0: ICD-10-CM

## 2018-02-21 DIAGNOSIS — E87.6: ICD-10-CM

## 2018-02-21 LAB
ABNORMAL IP MESSAGE: 1
ADD MAN DIFF?: NO
ALANINE AMINOTRANSFERASE: 27 IU/L (ref 13–69)
ALBUMIN/GLOBULIN RATIO: 1.64
ALBUMIN: 4.6 G/DL (ref 3.3–4.9)
ALKALINE PHOSPHATASE: 55 IU/L (ref 42–121)
ANION GAP: 22 (ref 8–16)
ANISOCYTOSIS: (no result) (ref 0–0)
ASPARTATE AMINO TRANSFERASE: 24 IU/L (ref 15–46)
BAND NEUTROPHILS #M: 1.1 10^3/UL (ref 0–0.6)
BAND NEUTROPHILS % (M): 5 % (ref 0–4)
BILIRUBIN,DIRECT: 0 MG/DL (ref 0–0.2)
BILIRUBIN,TOTAL: 0.6 MG/DL (ref 0.2–1.3)
BLOOD UREA NITROGEN: 38 MG/DL (ref 7–20)
CALCIUM: 11.6 MG/DL (ref 8.4–10.2)
CARBON DIOXIDE: 24 MMOL/L (ref 21–31)
CHLORIDE: 100 MMOL/L (ref 97–110)
CREATININE: 3.43 MG/DL (ref 0.61–1.24)
GIANT THROMBO% (M): 1 % (ref 0–0)
GLOBULIN: 2.8 G/DL (ref 1.3–3.2)
GLUCOSE: 148 MG/DL (ref 70–220)
HEMATOCRIT: 41.2 % (ref 42–52)
HEMOGLOBIN: 14.5 G/DL (ref 14–18)
LIPASE: 25 U/L (ref 23–300)
LYMPHOCYTES #M: 0.6 10^3/UL (ref 0.8–2.9)
LYMPHOCYTES % (M): 3 % (ref 15–51)
MEAN CORPUSCULAR HEMOGLOBIN: 31.9 PG (ref 29–33)
MEAN CORPUSCULAR HGB CONC: 35.2 G/DL (ref 32–37)
MEAN CORPUSCULAR VOLUME: 90.5 FL (ref 82–101)
MEAN PLATELET VOLUME: 11.1 FL (ref 7.4–10.4)
MICROCYTOSIS: (no result) (ref 0–0)
MONOCYTE #M: 2.7 10^3/UL (ref 0.3–0.9)
MONOCYTES % (M): 12 % (ref 0–11)
NUCLEATED RED BLOOD CELLS%: 0 /100WBC (ref 0–0)
PLATELET COUNT: 241 10^3/UL (ref 140–415)
PLATELET ESTIMATE: NORMAL
POSITIVE DIFF: (no result)
POTASSIUM: 3.3 MMOL/L (ref 3.5–5.1)
REACTIVE LYMPHOCYTES #M: 1.1 10^3/UL (ref 0–0)
REACTIVE LYMPHOCYTES% (M): 5 % (ref 0–0)
RED BLOOD COUNT: 4.55 10^6/UL (ref 4.7–6.1)
RED CELL DISTRIBUTION WIDTH: 13.5 % (ref 11.5–14.5)
SEG NEUT #M: 17.6 10^3/UL (ref 1.6–7.5)
SEGMENTED NEUTROPHILS (M) %: 75 % (ref 39–77)
SMUDGE%M: 2 % (ref 0–0)
SODIUM: 143 MMOL/L (ref 135–144)
TOTAL PROTEIN: 7.4 G/DL (ref 6.1–8.1)
TROPONIN-I: < 0.012 NG/ML (ref 0–0.12)
WHITE BLOOD COUNT: 23.1 10^3/UL (ref 4.8–10.8)

## 2018-02-21 PROCEDURE — 88312 SPECIAL STAINS GROUP 1: CPT

## 2018-02-21 PROCEDURE — 96375 TX/PRO/DX INJ NEW DRUG ADDON: CPT

## 2018-02-21 PROCEDURE — 87086 URINE CULTURE/COLONY COUNT: CPT

## 2018-02-21 PROCEDURE — 99285 EMERGENCY DEPT VISIT HI MDM: CPT

## 2018-02-21 PROCEDURE — 84300 ASSAY OF URINE SODIUM: CPT

## 2018-02-21 PROCEDURE — 93005 ELECTROCARDIOGRAM TRACING: CPT

## 2018-02-21 PROCEDURE — 84155 ASSAY OF PROTEIN SERUM: CPT

## 2018-02-21 PROCEDURE — 88313 SPECIAL STAINS GROUP 2: CPT

## 2018-02-21 PROCEDURE — 81003 URINALYSIS AUTO W/O SCOPE: CPT

## 2018-02-21 PROCEDURE — 83735 ASSAY OF MAGNESIUM: CPT

## 2018-02-21 PROCEDURE — 82043 UR ALBUMIN QUANTITATIVE: CPT

## 2018-02-21 PROCEDURE — 88305 TISSUE EXAM BY PATHOLOGIST: CPT

## 2018-02-21 PROCEDURE — 84484 ASSAY OF TROPONIN QUANT: CPT

## 2018-02-21 PROCEDURE — 71045 X-RAY EXAM CHEST 1 VIEW: CPT

## 2018-02-21 PROCEDURE — 80053 COMPREHEN METABOLIC PANEL: CPT

## 2018-02-21 PROCEDURE — 80048 BASIC METABOLIC PNL TOTAL CA: CPT

## 2018-02-21 PROCEDURE — 96374 THER/PROPH/DIAG INJ IV PUSH: CPT

## 2018-02-21 PROCEDURE — 84100 ASSAY OF PHOSPHORUS: CPT

## 2018-02-21 PROCEDURE — 36415 COLL VENOUS BLD VENIPUNCTURE: CPT

## 2018-02-21 PROCEDURE — 83605 ASSAY OF LACTIC ACID: CPT

## 2018-02-21 PROCEDURE — 83690 ASSAY OF LIPASE: CPT

## 2018-02-21 PROCEDURE — 85025 COMPLETE CBC W/AUTO DIFF WBC: CPT

## 2018-02-21 PROCEDURE — 84145 PROCALCITONIN (PCT): CPT

## 2018-02-21 PROCEDURE — 87040 BLOOD CULTURE FOR BACTERIA: CPT

## 2018-02-21 PROCEDURE — 74176 CT ABD & PELVIS W/O CONTRAST: CPT

## 2018-02-21 RX ADMIN — TACROLIMUS 1 MG: 1 CAPSULE ORAL at 23:08

## 2018-02-21 RX ADMIN — THIAMINE HYDROCHLORIDE 1 MLS/HR: 100 INJECTION, SOLUTION INTRAMUSCULAR; INTRAVENOUS at 16:00

## 2018-02-21 RX ADMIN — TACROLIMUS 1 MG: 1 CAPSULE ORAL at 21:30

## 2018-02-21 RX ADMIN — MYCOPHENOLIC ACID 1 MG: 180 TABLET, DELAYED RELEASE ORAL at 21:30

## 2018-02-21 RX ADMIN — ALUMINUM HYDROXIDE, MAGNESIUM HYDROXIDE, DIMETHICONE 1 ML: 200; 200; 20 SUSPENSION ORAL at 19:45

## 2018-02-21 RX ADMIN — METOCLOPRAMIDE HYDROCHLORIDE 1 MG: 10 INJECTION, SOLUTION INTRAMUSCULAR; INTRAVENOUS at 17:11

## 2018-02-21 RX ADMIN — ASCORBIC ACID, VITAMIN A PALMITATE, CHOLECALCIFEROL, THIAMINE HYDROCHLORIDE, RIBOFLAVIN-5 PHOSPHATE SODIUM, PYRIDOXINE HYDROCHLORIDE, NIACINAMIDE, DEXPANTHENOL, ALPHA-TOCOPHEROL ACETATE, VITAMIN K1, FOLIC ACID, BIOTIN, CYANOCOBALAMIN 1 MLS/HR: 200; 3300; 200; 6; 3.6; 6; 40; 15; 10; 150; 600; 60; 5 INJECTION, SOLUTION INTRAVENOUS at 21:46

## 2018-02-21 RX ADMIN — MYCOPHENOLIC ACID 1 MG: 180 TABLET, DELAYED RELEASE ORAL at 23:07

## 2018-02-21 RX ADMIN — ONDANSETRON HYDROCHLORIDE 1 MG: 2 INJECTION, SOLUTION INTRAMUSCULAR; INTRAVENOUS at 16:00

## 2018-02-22 LAB
ABNORMAL IP MESSAGE: 1
ADD MAN DIFF?: NO
ADD UMIC: NO
ANION GAP: 12 (ref 8–16)
BASOPHIL #: 0.1 10^3/UL (ref 0–0.1)
BASOPHILS %: 0.3 % (ref 0–2)
BLOOD UREA NITROGEN: 30 MG/DL (ref 7–20)
CALCIUM: 9.2 MG/DL (ref 8.4–10.2)
CARBON DIOXIDE: 27 MMOL/L (ref 21–31)
CHLORIDE: 104 MMOL/L (ref 97–110)
CREATININE,URINE RANDOM: 77.44 MG/DL (ref 20–370)
CREATININE: 1.95 MG/DL (ref 0.61–1.24)
EOSINOPHILS #: 0 10^3/UL (ref 0–0.5)
EOSINOPHILS %: 0.1 % (ref 0–7)
GLUCOSE: 129 MG/DL (ref 70–220)
HEMATOCRIT: 37.1 % (ref 42–52)
HEMOGLOBIN: 12.9 G/DL (ref 14–18)
LACTIC ACID: 1.5 MMOL/L (ref 0.5–2)
LACTIC ACID: 3.6 MMOL/L (ref 0.5–2)
LYMPHOCYTES #: 1.9 10^3/UL (ref 0.8–2.9)
LYMPHOCYTES %: 11 % (ref 15–51)
MAGNESIUM: 1.3 MG/DL (ref 1.7–2.5)
MEAN CORPUSCULAR HEMOGLOBIN: 32 PG (ref 29–33)
MEAN CORPUSCULAR HGB CONC: 34.8 G/DL (ref 32–37)
MEAN CORPUSCULAR VOLUME: 92.1 FL (ref 82–101)
MEAN PLATELET VOLUME: 11.2 FL (ref 7.4–10.4)
MONOCYTE #: 1.7 10^3/UL (ref 0.3–0.9)
MONOCYTES %: 10 % (ref 0–11)
NEUTROPHIL #: 13.5 10^3/UL (ref 1.6–7.5)
NEUTROPHILS %: 78.1 % (ref 39–77)
NUCLEATED RED BLOOD CELLS #: 0 10^3/UL (ref 0–0)
NUCLEATED RED BLOOD CELLS%: 0 /100WBC (ref 0–0)
PHOSPHORUS: 2.9 MG/DL (ref 2.5–4.9)
PLATELET COUNT: 198 10^3/UL (ref 140–415)
POSITIVE DIFF: (no result)
POTASSIUM: 3.1 MMOL/L (ref 3.5–5.1)
PROTEIN URINE: 19 MG/DL (ref 0–11.9)
RED BLOOD COUNT: 4.03 10^6/UL (ref 4.7–6.1)
RED CELL DISTRIBUTION WIDTH: 13.5 % (ref 11.5–14.5)
SODIUM,URINE RANDOM: 30 MMOL/L (ref 30–90)
SODIUM: 140 MMOL/L (ref 135–144)
TROPONIN-I: < 0.012 NG/ML (ref 0–0.12)
UR ASCORBIC ACID: NEGATIVE MG/DL
UR BILIRUBIN (DIP): NEGATIVE MG/DL
UR BLOOD (DIP): NEGATIVE MG/DL
UR CLARITY: CLEAR
UR COLOR: YELLOW
UR GLUCOSE (DIP): NEGATIVE MG/DL
UR KETONES (DIP): NEGATIVE MG/DL
UR LEUKOCYTE ESTERASE (DIP): NEGATIVE LEU/UL
UR NITRITE (DIP): NEGATIVE MG/DL
UR PH (DIP): 6 (ref 5–9)
UR SPECIFIC GRAVITY (DIP): 1.01 (ref 1–1.03)
UR TOTAL PROTEIN (DIP): NEGATIVE MG/DL
UR UROBILINOGEN (DIP): NEGATIVE MG/DL
WHITE BLOOD COUNT: 17.3 10^3/UL (ref 4.8–10.8)

## 2018-02-22 RX ADMIN — CALCIUM CARBONATE (ANTACID) CHEW TAB 500 MG 1 MG: 500 CHEW TAB at 14:27

## 2018-02-22 RX ADMIN — METOPROLOL TARTRATE 1 MG: 50 TABLET, FILM COATED ORAL at 21:03

## 2018-02-22 RX ADMIN — VANCOMYCIN HYDROCHLORIDE 1 MLS/HR: 1 INJECTION, POWDER, LYOPHILIZED, FOR SOLUTION INTRAVENOUS at 20:20

## 2018-02-22 RX ADMIN — CALCIUM CARBONATE (ANTACID) CHEW TAB 500 MG 1 MG: 500 CHEW TAB at 18:52

## 2018-02-22 RX ADMIN — BISMUTH SUBSALICYLATE 1 ML: 525 SUSPENSION ORAL at 16:07

## 2018-02-22 RX ADMIN — BISMUTH SUBSALICYLATE 1 ML: 525 SUSPENSION ORAL at 22:41

## 2018-02-22 RX ADMIN — CEFEPIME 1 MLS/HR: 1 INJECTION, POWDER, FOR SOLUTION INTRAMUSCULAR; INTRAVENOUS at 16:35

## 2018-02-22 RX ADMIN — ASCORBIC ACID, VITAMIN A PALMITATE, CHOLECALCIFEROL, THIAMINE HYDROCHLORIDE, RIBOFLAVIN-5 PHOSPHATE SODIUM, PYRIDOXINE HYDROCHLORIDE, NIACINAMIDE, DEXPANTHENOL, ALPHA-TOCOPHEROL ACETATE, VITAMIN K1, FOLIC ACID, BIOTIN, CYANOCOBALAMIN 1 MLS/HR: 200; 3300; 200; 6; 3.6; 6; 40; 15; 10; 150; 600; 60; 5 INJECTION, SOLUTION INTRAVENOUS at 08:59

## 2018-02-22 RX ADMIN — MAGNESIUM SULFATE HEPTAHYDRATE 1 MLS/HR: 40 INJECTION, SOLUTION INTRAVENOUS at 17:43

## 2018-02-22 RX ADMIN — METOPROLOL TARTRATE 1 MG: 50 TABLET, FILM COATED ORAL at 09:01

## 2018-02-22 RX ADMIN — PANTOPRAZOLE SODIUM 1 MG: 40 INJECTION, POWDER, FOR SOLUTION INTRAVENOUS at 18:52

## 2018-02-22 RX ADMIN — PANTOPRAZOLE SODIUM 1 MG: 40 TABLET, DELAYED RELEASE ORAL at 05:21

## 2018-02-22 RX ADMIN — ALPRAZOLAM 1 MG: 0.5 TABLET ORAL at 00:06

## 2018-02-22 RX ADMIN — TACROLIMUS 1 MG: 1 CAPSULE ORAL at 09:01

## 2018-02-22 RX ADMIN — MYCOPHENOLIC ACID 1 MG: 180 TABLET, DELAYED RELEASE ORAL at 21:03

## 2018-02-22 RX ADMIN — ALPRAZOLAM 1 MG: 0.5 TABLET ORAL at 09:12

## 2018-02-22 RX ADMIN — ONDANSETRON HYDROCHLORIDE 1 MG: 2 INJECTION, SOLUTION INTRAMUSCULAR; INTRAVENOUS at 11:07

## 2018-02-22 RX ADMIN — ASPIRIN 1 MG: 81 TABLET, COATED ORAL at 09:00

## 2018-02-22 RX ADMIN — ALPRAZOLAM 1 MG: 0.5 TABLET ORAL at 22:40

## 2018-02-22 RX ADMIN — AMLODIPINE BESYLATE 1 MG: 10 TABLET ORAL at 09:00

## 2018-02-22 RX ADMIN — THIAMINE HYDROCHLORIDE 1 MLS/HR: 100 INJECTION, SOLUTION INTRAMUSCULAR; INTRAVENOUS at 16:35

## 2018-02-22 RX ADMIN — TACROLIMUS 1 MG: 1 CAPSULE ORAL at 21:03

## 2018-02-22 RX ADMIN — MYCOPHENOLIC ACID 1 MG: 180 TABLET, DELAYED RELEASE ORAL at 09:01

## 2018-02-22 RX ADMIN — POTASSIUM CHLORIDE 1 MEQ: 1500 TABLET, EXTENDED RELEASE ORAL at 10:29

## 2018-02-23 LAB
ADD MAN DIFF?: NO
ANION GAP: 15 (ref 8–16)
BASOPHIL #: 0.1 10^3/UL (ref 0–0.1)
BASOPHILS %: 0.3 % (ref 0–2)
BLOOD UREA NITROGEN: 26 MG/DL (ref 7–20)
CALCIUM: 9.4 MG/DL (ref 8.4–10.2)
CARBON DIOXIDE: 24 MMOL/L (ref 21–31)
CHLORIDE: 106 MMOL/L (ref 97–110)
CREATININE: 1.75 MG/DL (ref 0.61–1.24)
EOSINOPHILS #: 0.1 10^3/UL (ref 0–0.5)
EOSINOPHILS %: 0.7 % (ref 0–7)
GLUCOSE: 107 MG/DL (ref 70–220)
HEMATOCRIT: 37.9 % (ref 42–52)
HEMOGLOBIN: 13.3 G/DL (ref 14–18)
LYMPHOCYTES #: 2.8 10^3/UL (ref 0.8–2.9)
LYMPHOCYTES %: 19.2 % (ref 15–51)
MAGNESIUM: 1.9 MG/DL (ref 1.7–2.5)
MEAN CORPUSCULAR HEMOGLOBIN: 32.1 PG (ref 29–33)
MEAN CORPUSCULAR HGB CONC: 35.1 G/DL (ref 32–37)
MEAN CORPUSCULAR VOLUME: 91.5 FL (ref 82–101)
MEAN PLATELET VOLUME: 11.3 FL (ref 7.4–10.4)
MICROALBUMIN/CREATININE RATIO: 11 (ref ?–30)
MICROALBUMIN: 1 MG/DL
MONOCYTE #: 1.3 10^3/UL (ref 0.3–0.9)
MONOCYTES %: 9.3 % (ref 0–11)
NEUTROPHIL #: 10.1 10^3/UL (ref 1.6–7.5)
NEUTROPHILS %: 70.1 % (ref 39–77)
NUCLEATED RED BLOOD CELLS #: 0 10^3/UL (ref 0–0)
NUCLEATED RED BLOOD CELLS%: 0 /100WBC (ref 0–0)
PHOSPHORUS: 2.8 MG/DL (ref 2.5–4.9)
PLATELET COUNT: 192 10^3/UL (ref 140–415)
POTASSIUM: 3.7 MMOL/L (ref 3.5–5.1)
RED BLOOD COUNT: 4.14 10^6/UL (ref 4.7–6.1)
RED CELL DISTRIBUTION WIDTH: 13.5 % (ref 11.5–14.5)
SODIUM: 141 MMOL/L (ref 135–144)
WHITE BLOOD COUNT: 14.4 10^3/UL (ref 4.8–10.8)

## 2018-02-23 RX ADMIN — THIAMINE HYDROCHLORIDE 1 MLS/HR: 100 INJECTION, SOLUTION INTRAMUSCULAR; INTRAVENOUS at 02:30

## 2018-02-23 RX ADMIN — CEFEPIME 1 MLS/HR: 1 INJECTION, POWDER, FOR SOLUTION INTRAMUSCULAR; INTRAVENOUS at 20:33

## 2018-02-23 RX ADMIN — THIAMINE HYDROCHLORIDE 1 MLS/HR: 100 INJECTION, SOLUTION INTRAMUSCULAR; INTRAVENOUS at 22:30

## 2018-02-23 RX ADMIN — TACROLIMUS 1 MG: 1 CAPSULE ORAL at 20:33

## 2018-02-23 RX ADMIN — CEFEPIME 1 MLS/HR: 1 INJECTION, POWDER, FOR SOLUTION INTRAMUSCULAR; INTRAVENOUS at 01:18

## 2018-02-23 RX ADMIN — METOPROLOL TARTRATE 1 MG: 50 TABLET, FILM COATED ORAL at 20:34

## 2018-02-23 RX ADMIN — LIDOCAINE HYDROCHLORIDE 1 MG: 20 INJECTION, SOLUTION INTRAVENOUS at 18:09

## 2018-02-23 RX ADMIN — ASPIRIN 1 MG: 81 TABLET, COATED ORAL at 08:39

## 2018-02-23 RX ADMIN — PANTOPRAZOLE SODIUM 1 MG: 40 INJECTION, POWDER, FOR SOLUTION INTRAVENOUS at 20:32

## 2018-02-23 RX ADMIN — ALPRAZOLAM 1 MG: 0.5 TABLET ORAL at 22:30

## 2018-02-23 RX ADMIN — MYCOPHENOLIC ACID 1 MG: 180 TABLET, DELAYED RELEASE ORAL at 20:33

## 2018-02-23 RX ADMIN — THIAMINE HYDROCHLORIDE 1 MLS/HR: 100 INJECTION, SOLUTION INTRAMUSCULAR; INTRAVENOUS at 12:25

## 2018-02-23 RX ADMIN — MYCOPHENOLIC ACID 1 MG: 180 TABLET, DELAYED RELEASE ORAL at 08:39

## 2018-02-23 RX ADMIN — PANTOPRAZOLE SODIUM 1 MG: 40 INJECTION, POWDER, FOR SOLUTION INTRAVENOUS at 06:15

## 2018-02-23 RX ADMIN — CALCIUM CARBONATE (ANTACID) CHEW TAB 500 MG 1 MG: 500 CHEW TAB at 01:35

## 2018-02-23 RX ADMIN — METOCLOPRAMIDE HYDROCHLORIDE 1 MG: 10 INJECTION, SOLUTION INTRAMUSCULAR; INTRAVENOUS at 18:05

## 2018-02-23 RX ADMIN — CEFEPIME 1 MLS/HR: 1 INJECTION, POWDER, FOR SOLUTION INTRAMUSCULAR; INTRAVENOUS at 08:39

## 2018-02-23 RX ADMIN — VANCOMYCIN HYDROCHLORIDE 1 MLS/HR: 500 INJECTION, POWDER, LYOPHILIZED, FOR SOLUTION INTRAVENOUS at 14:26

## 2018-02-23 RX ADMIN — MIDAZOLAM HYDROCHLORIDE 1 MG: 2 INJECTION, SOLUTION INTRAMUSCULAR; INTRAVENOUS at 18:08

## 2018-02-23 RX ADMIN — AMLODIPINE BESYLATE 1 MG: 10 TABLET ORAL at 08:40

## 2018-02-23 RX ADMIN — METOPROLOL TARTRATE 1 MG: 50 TABLET, FILM COATED ORAL at 08:40

## 2018-02-23 RX ADMIN — TACROLIMUS 1 MG: 1 CAPSULE ORAL at 08:39

## 2018-02-24 LAB
ADD MAN DIFF?: NO
ANION GAP: 15 (ref 8–16)
BASOPHIL #: 0.1 10^3/UL (ref 0–0.1)
BASOPHILS %: 0.5 % (ref 0–2)
BLOOD UREA NITROGEN: 20 MG/DL (ref 7–20)
CALCIUM: 8.2 MG/DL (ref 8.4–10.2)
CARBON DIOXIDE: 24 MMOL/L (ref 21–31)
CHLORIDE: 109 MMOL/L (ref 97–110)
CREATININE: 1.16 MG/DL (ref 0.61–1.24)
EOSINOPHILS #: 0.2 10^3/UL (ref 0–0.5)
EOSINOPHILS %: 2.4 % (ref 0–7)
GLUCOSE: 88 MG/DL (ref 70–220)
HEMATOCRIT: 37.1 % (ref 42–52)
HEMOGLOBIN: 12.6 G/DL (ref 14–18)
LYMPHOCYTES #: 2.3 10^3/UL (ref 0.8–2.9)
LYMPHOCYTES %: 24.9 % (ref 15–51)
MAGNESIUM: 1.7 MG/DL (ref 1.7–2.5)
MEAN CORPUSCULAR HEMOGLOBIN: 31.5 PG (ref 29–33)
MEAN CORPUSCULAR HGB CONC: 34 G/DL (ref 32–37)
MEAN CORPUSCULAR VOLUME: 92.8 FL (ref 82–101)
MEAN PLATELET VOLUME: 11.4 FL (ref 7.4–10.4)
MONOCYTE #: 0.9 10^3/UL (ref 0.3–0.9)
MONOCYTES %: 10.2 % (ref 0–11)
NEUTROPHIL #: 5.7 10^3/UL (ref 1.6–7.5)
NEUTROPHILS %: 61.7 % (ref 39–77)
NUCLEATED RED BLOOD CELLS #: 0 10^3/UL (ref 0–0)
NUCLEATED RED BLOOD CELLS%: 0 /100WBC (ref 0–0)
PHOSPHORUS: 2.3 MG/DL (ref 2.5–4.9)
PLATELET COUNT: 176 10^3/UL (ref 140–415)
POTASSIUM: 3.3 MMOL/L (ref 3.5–5.1)
RED BLOOD COUNT: 4 10^6/UL (ref 4.7–6.1)
RED CELL DISTRIBUTION WIDTH: 13.6 % (ref 11.5–14.5)
SODIUM: 145 MMOL/L (ref 135–144)
WHITE BLOOD COUNT: 9.2 10^3/UL (ref 4.8–10.8)

## 2018-02-24 RX ADMIN — METOCLOPRAMIDE HYDROCHLORIDE 1 MG: 10 INJECTION, SOLUTION INTRAMUSCULAR; INTRAVENOUS at 05:20

## 2018-02-24 RX ADMIN — AMLODIPINE BESYLATE 1 MG: 10 TABLET ORAL at 08:45

## 2018-02-24 RX ADMIN — VANCOMYCIN HYDROCHLORIDE 1 MLS/HR: 500 INJECTION, POWDER, LYOPHILIZED, FOR SOLUTION INTRAVENOUS at 02:30

## 2018-02-24 RX ADMIN — ASPIRIN 1 MG: 81 TABLET, COATED ORAL at 08:45

## 2018-02-24 RX ADMIN — CEFEPIME 1 MLS/HR: 1 INJECTION, POWDER, FOR SOLUTION INTRAMUSCULAR; INTRAVENOUS at 08:44

## 2018-02-24 RX ADMIN — MYCOPHENOLIC ACID 1 MG: 180 TABLET, DELAYED RELEASE ORAL at 08:48

## 2018-02-24 RX ADMIN — TACROLIMUS 1 MG: 1 CAPSULE ORAL at 08:45

## 2018-02-24 RX ADMIN — METOCLOPRAMIDE HYDROCHLORIDE 1 MG: 10 INJECTION, SOLUTION INTRAMUSCULAR; INTRAVENOUS at 11:34

## 2018-02-24 RX ADMIN — THIAMINE HYDROCHLORIDE 1 MLS/HR: 100 INJECTION, SOLUTION INTRAMUSCULAR; INTRAVENOUS at 02:35

## 2018-02-24 RX ADMIN — PANTOPRAZOLE SODIUM 1 MG: 40 INJECTION, POWDER, FOR SOLUTION INTRAVENOUS at 05:20

## 2018-02-24 RX ADMIN — METOPROLOL TARTRATE 1 MG: 50 TABLET, FILM COATED ORAL at 08:45

## 2018-02-24 RX ADMIN — METOCLOPRAMIDE HYDROCHLORIDE 1 MG: 10 INJECTION, SOLUTION INTRAMUSCULAR; INTRAVENOUS at 00:12

## 2018-02-25 LAB — PROCALCITONIN: <0.1 NG/ML (ref ?–0.1)

## 2018-03-21 ENCOUNTER — HOSPITAL ENCOUNTER (EMERGENCY)
Age: 53
Discharge: HOME | End: 2018-03-21

## 2018-03-21 ENCOUNTER — HOSPITAL ENCOUNTER (EMERGENCY)
Dept: HOSPITAL 91 - E/R | Age: 53
Discharge: HOME | End: 2018-03-21
Payer: MEDICARE

## 2018-03-21 DIAGNOSIS — E10.9: ICD-10-CM

## 2018-03-21 DIAGNOSIS — J20.9: Primary | ICD-10-CM

## 2018-03-21 DIAGNOSIS — Z79.82: ICD-10-CM

## 2018-03-21 LAB
ADD MAN DIFF?: NO
ALANINE AMINOTRANSFERASE: 26 IU/L (ref 13–69)
ALBUMIN/GLOBULIN RATIO: 1.37
ALBUMIN: 3.7 G/DL (ref 3.3–4.9)
ALKALINE PHOSPHATASE: 56 IU/L (ref 42–121)
ANION GAP: 18 (ref 8–16)
ASPARTATE AMINO TRANSFERASE: 18 IU/L (ref 15–46)
BASOPHIL #: 0.1 10^3/UL (ref 0–0.1)
BASOPHILS %: 0.6 % (ref 0–2)
BILIRUBIN,DIRECT: 0 MG/DL (ref 0–0.2)
BILIRUBIN,TOTAL: 0.6 MG/DL (ref 0.2–1.3)
BLOOD UREA NITROGEN: 18 MG/DL (ref 7–20)
CALCIUM: 9 MG/DL (ref 8.4–10.2)
CARBON DIOXIDE: 24 MMOL/L (ref 21–31)
CHLORIDE: 107 MMOL/L (ref 97–110)
CREATININE: 1.32 MG/DL (ref 0.61–1.24)
EOSINOPHILS #: 0.3 10^3/UL (ref 0–0.5)
EOSINOPHILS %: 3 % (ref 0–7)
GLOBULIN: 2.7 G/DL (ref 1.3–3.2)
GLUCOSE: 92 MG/DL (ref 70–220)
HEMATOCRIT: 39.5 % (ref 42–52)
HEMOGLOBIN: 13.6 G/DL (ref 14–18)
LYMPHOCYTES #: 2.1 10^3/UL (ref 0.8–2.9)
LYMPHOCYTES %: 25.1 % (ref 15–51)
MEAN CORPUSCULAR HEMOGLOBIN: 31.5 PG (ref 29–33)
MEAN CORPUSCULAR HGB CONC: 34.4 G/DL (ref 32–37)
MEAN CORPUSCULAR VOLUME: 91.4 FL (ref 82–101)
MEAN PLATELET VOLUME: 11.2 FL (ref 7.4–10.4)
MONOCYTE #: 0.8 10^3/UL (ref 0.3–0.9)
MONOCYTES %: 10 % (ref 0–11)
NEUTROPHIL #: 5.1 10^3/UL (ref 1.6–7.5)
NEUTROPHILS %: 60.8 % (ref 39–77)
NUCLEATED RED BLOOD CELLS #: 0 10^3/UL (ref 0–0)
NUCLEATED RED BLOOD CELLS%: 0 /100WBC (ref 0–0)
PLATELET COUNT: 223 10^3/UL (ref 140–415)
POTASSIUM: 3.4 MMOL/L (ref 3.5–5.1)
RED BLOOD COUNT: 4.32 10^6/UL (ref 4.7–6.1)
RED CELL DISTRIBUTION WIDTH: 13.2 % (ref 11.5–14.5)
SODIUM: 146 MMOL/L (ref 135–144)
TOTAL PROTEIN: 6.4 G/DL (ref 6.1–8.1)
WHITE BLOOD COUNT: 8.4 10^3/UL (ref 4.8–10.8)

## 2018-03-21 PROCEDURE — 85025 COMPLETE CBC W/AUTO DIFF WBC: CPT

## 2018-03-21 PROCEDURE — 94664 DEMO&/EVAL PT USE INHALER: CPT

## 2018-03-21 PROCEDURE — 71045 X-RAY EXAM CHEST 1 VIEW: CPT

## 2018-03-21 PROCEDURE — 80053 COMPREHEN METABOLIC PANEL: CPT

## 2018-03-21 PROCEDURE — 96374 THER/PROPH/DIAG INJ IV PUSH: CPT

## 2018-03-21 PROCEDURE — 99284 EMERGENCY DEPT VISIT MOD MDM: CPT

## 2018-03-21 PROCEDURE — 94640 AIRWAY INHALATION TREATMENT: CPT

## 2018-03-21 RX ADMIN — ALBUTEROL SULFATE 1 MG: 2.5 SOLUTION RESPIRATORY (INHALATION) at 20:14

## 2018-03-21 RX ADMIN — ALBUTEROL SULFATE 1 MG: 2.5 SOLUTION RESPIRATORY (INHALATION) at 19:23

## 2018-03-21 RX ADMIN — METHYLPREDNISOLONE SODIUM SUCCINATE 1 MG: 125 INJECTION, POWDER, FOR SOLUTION INTRAMUSCULAR; INTRAVENOUS at 20:13

## 2018-03-21 RX ADMIN — IPRATROPIUM BROMIDE 1 MG: 0.5 SOLUTION RESPIRATORY (INHALATION) at 20:14

## 2018-03-21 RX ADMIN — THIAMINE HYDROCHLORIDE 1 MLS/HR: 100 INJECTION, SOLUTION INTRAMUSCULAR; INTRAVENOUS at 19:12

## 2018-03-21 RX ADMIN — IPRATROPIUM BROMIDE 1 MG: 0.5 SOLUTION RESPIRATORY (INHALATION) at 19:23

## 2018-05-10 ENCOUNTER — HOSPITAL ENCOUNTER (INPATIENT)
Dept: HOSPITAL 91 - E/R | Age: 53
LOS: 2 days | Discharge: HOME | DRG: 380 | End: 2018-05-12
Payer: MEDICARE

## 2018-05-10 ENCOUNTER — HOSPITAL ENCOUNTER (INPATIENT)
Age: 53
LOS: 2 days | Discharge: HOME | DRG: 380 | End: 2018-05-12

## 2018-05-10 DIAGNOSIS — I12.9: ICD-10-CM

## 2018-05-10 DIAGNOSIS — R12: ICD-10-CM

## 2018-05-10 DIAGNOSIS — T86.12: ICD-10-CM

## 2018-05-10 DIAGNOSIS — F41.9: ICD-10-CM

## 2018-05-10 DIAGNOSIS — N17.9: ICD-10-CM

## 2018-05-10 DIAGNOSIS — K22.10: Primary | ICD-10-CM

## 2018-05-10 DIAGNOSIS — I48.0: ICD-10-CM

## 2018-05-10 DIAGNOSIS — Z79.52: ICD-10-CM

## 2018-05-10 DIAGNOSIS — Z79.899: ICD-10-CM

## 2018-05-10 DIAGNOSIS — Z87.442: ICD-10-CM

## 2018-05-10 DIAGNOSIS — R07.89: ICD-10-CM

## 2018-05-10 DIAGNOSIS — R65.11: ICD-10-CM

## 2018-05-10 DIAGNOSIS — Z94.83: ICD-10-CM

## 2018-05-10 DIAGNOSIS — N18.9: ICD-10-CM

## 2018-05-10 LAB
ABNORMAL IP MESSAGE: 1
ADD MAN DIFF?: NO
ALANINE AMINOTRANSFERASE: 10 IU/L (ref 13–69)
ALBUMIN/GLOBULIN RATIO: 1.42
ALBUMIN: 4.7 G/DL (ref 3.3–4.9)
ALKALINE PHOSPHATASE: 69 IU/L (ref 42–121)
ANION GAP: 23 (ref 8–16)
ASPARTATE AMINO TRANSFERASE: 22 IU/L (ref 15–46)
BASOPHIL #: 0.1 10^3/UL (ref 0–0.1)
BASOPHILS %: 0.3 % (ref 0–2)
BILIRUBIN,DIRECT: 0 MG/DL (ref 0–0.2)
BILIRUBIN,TOTAL: 1.2 MG/DL (ref 0.2–1.3)
BLOOD UREA NITROGEN: 34 MG/DL (ref 7–20)
CALCIUM: 10.6 MG/DL (ref 8.4–10.2)
CARBON DIOXIDE: 21 MMOL/L (ref 21–31)
CHLORIDE: 101 MMOL/L (ref 97–110)
CREATININE: 2.64 MG/DL (ref 0.61–1.24)
EOSINOPHILS #: 0 10^3/UL (ref 0–0.5)
EOSINOPHILS %: 0 % (ref 0–7)
GLOBULIN: 3.3 G/DL (ref 1.3–3.2)
GLUCOSE: 156 MG/DL (ref 70–220)
HEMATOCRIT: 44.4 % (ref 42–52)
HEMOGLOBIN: 15 G/DL (ref 14–18)
LACTIC ACID: 1.7 MMOL/L (ref 0.5–2)
LIPASE: 18 U/L (ref 23–300)
LYMPHOCYTES #: 2.9 10^3/UL (ref 0.8–2.9)
LYMPHOCYTES %: 14 % (ref 15–51)
MEAN CORPUSCULAR HEMOGLOBIN: 32.3 PG (ref 29–33)
MEAN CORPUSCULAR HGB CONC: 33.8 G/DL (ref 32–37)
MEAN CORPUSCULAR VOLUME: 95.7 FL (ref 82–101)
MEAN PLATELET VOLUME: 12 FL (ref 7.4–10.4)
MONOCYTE #: 2 10^3/UL (ref 0.3–0.9)
MONOCYTES %: 9.7 % (ref 0–11)
NEUTROPHIL #: 15.5 10^3/UL (ref 1.6–7.5)
NEUTROPHILS %: 75.6 % (ref 39–77)
NUCLEATED RED BLOOD CELLS #: 0 10^3/UL (ref 0–0)
NUCLEATED RED BLOOD CELLS%: 0 /100WBC (ref 0–0)
PLATELET COUNT: 227 10^3/UL (ref 140–415)
POSITIVE DIFF: (no result)
POTASSIUM: 4 MMOL/L (ref 3.5–5.1)
RED BLOOD COUNT: 4.64 10^6/UL (ref 4.7–6.1)
RED CELL DISTRIBUTION WIDTH: 14.1 % (ref 11.5–14.5)
SODIUM: 141 MMOL/L (ref 135–144)
TOTAL PROTEIN: 8 G/DL (ref 6.1–8.1)
TROPONIN-I: < 0.012 NG/ML (ref 0–0.12)
TROPONIN-I: < 0.012 NG/ML (ref 0–0.12)
WHITE BLOOD COUNT: 20.5 10^3/UL (ref 4.8–10.8)

## 2018-05-10 PROCEDURE — 93005 ELECTROCARDIOGRAM TRACING: CPT

## 2018-05-10 PROCEDURE — 96375 TX/PRO/DX INJ NEW DRUG ADDON: CPT

## 2018-05-10 PROCEDURE — 83690 ASSAY OF LIPASE: CPT

## 2018-05-10 PROCEDURE — 80048 BASIC METABOLIC PNL TOTAL CA: CPT

## 2018-05-10 PROCEDURE — 80053 COMPREHEN METABOLIC PANEL: CPT

## 2018-05-10 PROCEDURE — 85025 COMPLETE CBC W/AUTO DIFF WBC: CPT

## 2018-05-10 PROCEDURE — 96374 THER/PROPH/DIAG INJ IV PUSH: CPT

## 2018-05-10 PROCEDURE — 84100 ASSAY OF PHOSPHORUS: CPT

## 2018-05-10 PROCEDURE — 84484 ASSAY OF TROPONIN QUANT: CPT

## 2018-05-10 PROCEDURE — 99285 EMERGENCY DEPT VISIT HI MDM: CPT

## 2018-05-10 PROCEDURE — 83605 ASSAY OF LACTIC ACID: CPT

## 2018-05-10 PROCEDURE — 71045 X-RAY EXAM CHEST 1 VIEW: CPT

## 2018-05-10 PROCEDURE — 83735 ASSAY OF MAGNESIUM: CPT

## 2018-05-10 PROCEDURE — 87040 BLOOD CULTURE FOR BACTERIA: CPT

## 2018-05-10 PROCEDURE — 74176 CT ABD & PELVIS W/O CONTRAST: CPT

## 2018-05-10 PROCEDURE — 36415 COLL VENOUS BLD VENIPUNCTURE: CPT

## 2018-05-10 PROCEDURE — 84145 PROCALCITONIN (PCT): CPT

## 2018-05-10 RX ADMIN — PANTOPRAZOLE SODIUM 1 MG: 40 TABLET, DELAYED RELEASE ORAL at 18:35

## 2018-05-10 RX ADMIN — PANTOPRAZOLE SODIUM 1 MG: 40 INJECTION, POWDER, FOR SOLUTION INTRAVENOUS at 18:35

## 2018-05-10 RX ADMIN — THIAMINE HYDROCHLORIDE 1 MLS/HR: 100 INJECTION, SOLUTION INTRAMUSCULAR; INTRAVENOUS at 13:45

## 2018-05-10 RX ADMIN — ONDANSETRON HYDROCHLORIDE 1 MG: 2 INJECTION, SOLUTION INTRAMUSCULAR; INTRAVENOUS at 13:45

## 2018-05-10 RX ADMIN — ALPRAZOLAM 1 MG: 1 TABLET ORAL at 23:10

## 2018-05-10 RX ADMIN — ONDANSETRON HYDROCHLORIDE 1 MG: 2 INJECTION, SOLUTION INTRAMUSCULAR; INTRAVENOUS at 16:00

## 2018-05-10 RX ADMIN — THIAMINE HYDROCHLORIDE 1 MLS/HR: 100 INJECTION, SOLUTION INTRAMUSCULAR; INTRAVENOUS at 16:02

## 2018-05-10 RX ADMIN — PROCHLORPERAZINE EDISYLATE 1 MG: 5 INJECTION INTRAMUSCULAR; INTRAVENOUS at 16:32

## 2018-05-10 RX ADMIN — ONDANSETRON HYDROCHLORIDE 1 MG: 2 INJECTION, SOLUTION INTRAMUSCULAR; INTRAVENOUS at 23:06

## 2018-05-10 RX ADMIN — METOPROLOL TARTRATE 1 MG: 50 TABLET, FILM COATED ORAL at 21:28

## 2018-05-10 RX ADMIN — MYCOPHENOLIC ACID 1 MG: 180 TABLET, DELAYED RELEASE ORAL at 21:29

## 2018-05-10 RX ADMIN — TACROLIMUS 1 MG: 1 CAPSULE ORAL at 21:29

## 2018-05-10 RX ADMIN — METOCLOPRAMIDE HYDROCHLORIDE 1 MG: 5 TABLET ORAL at 18:35

## 2018-05-11 LAB
ABNORMAL IP MESSAGE: 1
ADD MAN DIFF?: NO
ANION GAP: 13 (ref 8–16)
BASOPHIL #: 0.1 10^3/UL (ref 0–0.1)
BASOPHILS %: 0.3 % (ref 0–2)
BLOOD UREA NITROGEN: 32 MG/DL (ref 7–20)
CALCIUM: 9.1 MG/DL (ref 8.4–10.2)
CARBON DIOXIDE: 25 MMOL/L (ref 21–31)
CHLORIDE: 108 MMOL/L (ref 97–110)
CREATININE: 1.89 MG/DL (ref 0.61–1.24)
EOSINOPHILS #: 0 10^3/UL (ref 0–0.5)
EOSINOPHILS %: 0 % (ref 0–7)
GLUCOSE: 95 MG/DL (ref 70–220)
HEMATOCRIT: 39.9 % (ref 42–52)
HEMOGLOBIN: 13.2 G/DL (ref 14–18)
LYMPHOCYTES #: 2 10^3/UL (ref 0.8–2.9)
LYMPHOCYTES %: 11.7 % (ref 15–51)
MAGNESIUM: 1.7 MG/DL (ref 1.7–2.5)
MEAN CORPUSCULAR HEMOGLOBIN: 31.9 PG (ref 29–33)
MEAN CORPUSCULAR HGB CONC: 33.1 G/DL (ref 32–37)
MEAN CORPUSCULAR VOLUME: 96.4 FL (ref 82–101)
MEAN PLATELET VOLUME: 11.6 FL (ref 7.4–10.4)
MONOCYTE #: 1.8 10^3/UL (ref 0.3–0.9)
MONOCYTES %: 10.3 % (ref 0–11)
NEUTROPHIL #: 13.4 10^3/UL (ref 1.6–7.5)
NEUTROPHILS %: 77.1 % (ref 39–77)
NUCLEATED RED BLOOD CELLS #: 0 10^3/UL (ref 0–0)
NUCLEATED RED BLOOD CELLS%: 0 /100WBC (ref 0–0)
PHOSPHORUS: 3.7 MG/DL (ref 2.5–4.9)
PLATELET COUNT: 182 10^3/UL (ref 140–415)
POSITIVE DIFF: (no result)
POTASSIUM: 3.5 MMOL/L (ref 3.5–5.1)
RED BLOOD COUNT: 4.14 10^6/UL (ref 4.7–6.1)
RED CELL DISTRIBUTION WIDTH: 14.3 % (ref 11.5–14.5)
SODIUM: 142 MMOL/L (ref 135–144)
WHITE BLOOD COUNT: 17.4 10^3/UL (ref 4.8–10.8)

## 2018-05-11 RX ADMIN — METOCLOPRAMIDE HYDROCHLORIDE 1 MG: 5 TABLET ORAL at 12:10

## 2018-05-11 RX ADMIN — THIAMINE HYDROCHLORIDE 1 MLS/HR: 100 INJECTION, SOLUTION INTRAMUSCULAR; INTRAVENOUS at 05:10

## 2018-05-11 RX ADMIN — MYCOPHENOLIC ACID 1 MG: 180 TABLET, DELAYED RELEASE ORAL at 21:00

## 2018-05-11 RX ADMIN — METOCLOPRAMIDE HYDROCHLORIDE 1 MG: 5 TABLET ORAL at 17:17

## 2018-05-11 RX ADMIN — ASPIRIN 1 MG: 81 TABLET, COATED ORAL at 08:18

## 2018-05-11 RX ADMIN — SUCRALFATE 1 GM: 1 SUSPENSION ORAL at 17:17

## 2018-05-11 RX ADMIN — ALPRAZOLAM 1 MG: 1 TABLET ORAL at 21:00

## 2018-05-11 RX ADMIN — METOCLOPRAMIDE HYDROCHLORIDE 1 MG: 5 TABLET ORAL at 08:18

## 2018-05-11 RX ADMIN — TACROLIMUS 1 MG: 1 CAPSULE ORAL at 21:00

## 2018-05-11 RX ADMIN — MYCOPHENOLIC ACID 1 MG: 180 TABLET, DELAYED RELEASE ORAL at 08:19

## 2018-05-11 RX ADMIN — SUCRALFATE 1 GM: 1 SUSPENSION ORAL at 21:01

## 2018-05-11 RX ADMIN — PANTOPRAZOLE SODIUM 1 MG: 40 INJECTION, POWDER, FOR SOLUTION INTRAVENOUS at 17:22

## 2018-05-11 RX ADMIN — METOPROLOL TARTRATE 1 MG: 50 TABLET, FILM COATED ORAL at 08:19

## 2018-05-11 RX ADMIN — THIAMINE HYDROCHLORIDE 1 MLS/HR: 100 INJECTION, SOLUTION INTRAMUSCULAR; INTRAVENOUS at 17:22

## 2018-05-11 RX ADMIN — METOPROLOL TARTRATE 1 MG: 50 TABLET, FILM COATED ORAL at 21:01

## 2018-05-11 RX ADMIN — AMLODIPINE BESYLATE 1 MG: 10 TABLET ORAL at 08:19

## 2018-05-11 RX ADMIN — PANTOPRAZOLE SODIUM 1 MG: 40 INJECTION, POWDER, FOR SOLUTION INTRAVENOUS at 05:09

## 2018-05-11 RX ADMIN — TACROLIMUS 1 MG: 1 CAPSULE ORAL at 08:19

## 2018-05-12 LAB
ADD MAN DIFF?: NO
ANION GAP: 14 (ref 8–16)
BASOPHIL #: 0 10^3/UL (ref 0–0.1)
BASOPHILS %: 0.3 % (ref 0–2)
BLOOD UREA NITROGEN: 23 MG/DL (ref 7–20)
CALCIUM: 8.3 MG/DL (ref 8.4–10.2)
CARBON DIOXIDE: 25 MMOL/L (ref 21–31)
CHLORIDE: 105 MMOL/L (ref 97–110)
CREATININE: 1.24 MG/DL (ref 0.61–1.24)
EOSINOPHILS #: 0 10^3/UL (ref 0–0.5)
EOSINOPHILS %: 0.4 % (ref 0–7)
GLUCOSE: 94 MG/DL (ref 70–220)
HEMATOCRIT: 38.2 % (ref 42–52)
HEMOGLOBIN: 12.6 G/DL (ref 14–18)
LYMPHOCYTES #: 1.4 10^3/UL (ref 0.8–2.9)
LYMPHOCYTES %: 15.4 % (ref 15–51)
MAGNESIUM: 1.5 MG/DL (ref 1.7–2.5)
MEAN CORPUSCULAR HEMOGLOBIN: 31.7 PG (ref 29–33)
MEAN CORPUSCULAR HGB CONC: 33 G/DL (ref 32–37)
MEAN CORPUSCULAR VOLUME: 96.2 FL (ref 82–101)
MEAN PLATELET VOLUME: 11.4 FL (ref 7.4–10.4)
MONOCYTE #: 0.9 10^3/UL (ref 0.3–0.9)
MONOCYTES %: 9.8 % (ref 0–11)
NEUTROPHIL #: 6.7 10^3/UL (ref 1.6–7.5)
NEUTROPHILS %: 73.7 % (ref 39–77)
NUCLEATED RED BLOOD CELLS #: 0 10^3/UL (ref 0–0)
NUCLEATED RED BLOOD CELLS%: 0 /100WBC (ref 0–0)
PHOSPHORUS: 2.6 MG/DL (ref 2.5–4.9)
PLATELET COUNT: 158 10^3/UL (ref 140–415)
POTASSIUM: 3.2 MMOL/L (ref 3.5–5.1)
RED BLOOD COUNT: 3.97 10^6/UL (ref 4.7–6.1)
RED CELL DISTRIBUTION WIDTH: 13.8 % (ref 11.5–14.5)
SODIUM: 141 MMOL/L (ref 135–144)
WHITE BLOOD COUNT: 9.1 10^3/UL (ref 4.8–10.8)

## 2018-05-12 RX ADMIN — METOCLOPRAMIDE HYDROCHLORIDE 1 MG: 5 TABLET ORAL at 06:26

## 2018-05-12 RX ADMIN — AMLODIPINE BESYLATE 1 MG: 10 TABLET ORAL at 08:24

## 2018-05-12 RX ADMIN — POTASSIUM BICARBONATE 1 MEQ: 978 TABLET, EFFERVESCENT ORAL at 10:19

## 2018-05-12 RX ADMIN — ASPIRIN 1 MG: 81 TABLET, COATED ORAL at 08:23

## 2018-05-12 RX ADMIN — METOPROLOL TARTRATE 1 MG: 50 TABLET, FILM COATED ORAL at 08:24

## 2018-05-12 RX ADMIN — SUCRALFATE 1 GM: 1 SUSPENSION ORAL at 12:49

## 2018-05-12 RX ADMIN — MAGNESIUM SULFATE HEPTAHYDRATE 1 MLS/HR: 1 INJECTION, SOLUTION INTRAVENOUS at 10:19

## 2018-05-12 RX ADMIN — SUCRALFATE 1 GM: 1 SUSPENSION ORAL at 08:22

## 2018-05-12 RX ADMIN — PANTOPRAZOLE SODIUM 1 MG: 40 INJECTION, POWDER, FOR SOLUTION INTRAVENOUS at 06:26

## 2018-05-12 RX ADMIN — MYCOPHENOLIC ACID 1 MG: 180 TABLET, DELAYED RELEASE ORAL at 08:22

## 2018-05-12 RX ADMIN — THIAMINE HYDROCHLORIDE 1 MLS/HR: 100 INJECTION, SOLUTION INTRAMUSCULAR; INTRAVENOUS at 06:26

## 2018-05-12 RX ADMIN — TACROLIMUS 1 MG: 1 CAPSULE ORAL at 08:23

## 2018-05-12 RX ADMIN — METOCLOPRAMIDE HYDROCHLORIDE 1 MG: 5 TABLET ORAL at 11:52

## 2018-05-16 LAB — PROCALCITONIN: <0.1 NG/ML (ref ?–0.1)

## 2018-08-01 ENCOUNTER — HOSPITAL ENCOUNTER (INPATIENT)
Dept: HOSPITAL 91 - E/R | Age: 53
LOS: 4 days | Discharge: HOME | DRG: 380 | End: 2018-08-05
Payer: MEDICARE

## 2018-08-01 ENCOUNTER — HOSPITAL ENCOUNTER (INPATIENT)
Age: 53
LOS: 4 days | Discharge: HOME | DRG: 380 | End: 2018-08-05

## 2018-08-01 DIAGNOSIS — Z79.899: ICD-10-CM

## 2018-08-01 DIAGNOSIS — N17.9: ICD-10-CM

## 2018-08-01 DIAGNOSIS — K22.10: Primary | ICD-10-CM

## 2018-08-01 DIAGNOSIS — N18.9: ICD-10-CM

## 2018-08-01 DIAGNOSIS — F41.9: ICD-10-CM

## 2018-08-01 DIAGNOSIS — Z94.0: ICD-10-CM

## 2018-08-01 DIAGNOSIS — I48.0: ICD-10-CM

## 2018-08-01 DIAGNOSIS — E87.6: ICD-10-CM

## 2018-08-01 DIAGNOSIS — R65.11: ICD-10-CM

## 2018-08-01 DIAGNOSIS — E83.42: ICD-10-CM

## 2018-08-01 DIAGNOSIS — I12.9: ICD-10-CM

## 2018-08-01 DIAGNOSIS — Z94.83: ICD-10-CM

## 2018-08-01 LAB
ADD MAN DIFF?: NO
ADD UMIC: NO
ALANINE AMINOTRANSFERASE: 17 IU/L (ref 13–69)
ALBUMIN/GLOBULIN RATIO: 1.56
ALBUMIN: 4.7 G/DL (ref 3.3–4.9)
ALKALINE PHOSPHATASE: 68 IU/L (ref 42–121)
ANION GAP: 15 (ref 8–16)
ASPARTATE AMINO TRANSFERASE: 19 IU/L (ref 15–46)
BASOPHIL #: 0.1 10^3/UL (ref 0–0.1)
BASOPHILS %: 0.6 % (ref 0–2)
BILIRUBIN,DIRECT: 0 MG/DL (ref 0–0.2)
BILIRUBIN,TOTAL: 0.6 MG/DL (ref 0.2–1.3)
BLOOD UREA NITROGEN: 22 MG/DL (ref 7–20)
CALCIUM: 10.6 MG/DL (ref 8.4–10.2)
CARBON DIOXIDE: 25 MMOL/L (ref 21–31)
CHLORIDE: 108 MMOL/L (ref 97–110)
CREATININE,URINE RANDOM: 68.64 MG/DL (ref 20–370)
CREATININE: 1.76 MG/DL (ref 0.61–1.24)
EOSINOPHILS #: 0.1 10^3/UL (ref 0–0.5)
EOSINOPHILS %: 0.5 % (ref 0–7)
GLOBULIN: 3 G/DL (ref 1.3–3.2)
GLUCOSE: 183 MG/DL (ref 70–220)
HEMATOCRIT: 50 % (ref 42–52)
HEMOGLOBIN: 16.3 G/DL (ref 14–18)
LACTIC ACID: 1.5 MMOL/L (ref 0.5–2)
LIPASE: 32 U/L (ref 23–300)
LYMPHOCYTES #: 2.5 10^3/UL (ref 0.8–2.9)
LYMPHOCYTES %: 17.4 % (ref 15–51)
MEAN CORPUSCULAR HEMOGLOBIN: 30.6 PG (ref 29–33)
MEAN CORPUSCULAR HGB CONC: 32.6 G/DL (ref 32–37)
MEAN CORPUSCULAR VOLUME: 93.8 FL (ref 82–101)
MEAN PLATELET VOLUME: 11.5 FL (ref 7.4–10.4)
MONOCYTE #: 1.2 10^3/UL (ref 0.3–0.9)
MONOCYTES %: 8.1 % (ref 0–11)
NEUTROPHIL #: 10.5 10^3/UL (ref 1.6–7.5)
NEUTROPHILS %: 73 % (ref 39–77)
NUCLEATED RED BLOOD CELLS #: 0 10^3/UL (ref 0–0)
NUCLEATED RED BLOOD CELLS%: 0 /100WBC (ref 0–0)
PLATELET COUNT: 235 10^3/UL (ref 140–415)
POTASSIUM: 4.3 MMOL/L (ref 3.5–5.1)
PROTEIN URINE: 28 MG/DL (ref 0–11.9)
RED BLOOD COUNT: 5.33 10^6/UL (ref 4.7–6.1)
RED CELL DISTRIBUTION WIDTH: 13.8 % (ref 11.5–14.5)
SODIUM,URINE RANDOM: 111 MMOL/L (ref 30–90)
SODIUM: 144 MMOL/L (ref 135–144)
TOTAL PROTEIN: 7.7 G/DL (ref 6.1–8.1)
TROPONIN-I: < 0.01 NG/ML (ref 0–0.12)
UR ASCORBIC ACID: NEGATIVE MG/DL
UR BILIRUBIN (DIP): NEGATIVE MG/DL
UR BLOOD (DIP): NEGATIVE MG/DL
UR CLARITY: CLEAR
UR COLOR: YELLOW
UR GLUCOSE (DIP): (no result) MG/DL
UR KETONES (DIP): (no result) MG/DL
UR LEUKOCYTE ESTERASE (DIP): NEGATIVE LEU/UL
UR NITRITE (DIP): NEGATIVE MG/DL
UR PH (DIP): 7 (ref 5–9)
UR SPECIFIC GRAVITY (DIP): 1.01 (ref 1–1.03)
UR TOTAL PROTEIN (DIP): NEGATIVE MG/DL
UR UROBILINOGEN (DIP): NEGATIVE MG/DL
WHITE BLOOD COUNT: 14.4 10^3/UL (ref 4.8–10.8)

## 2018-08-01 PROCEDURE — 80048 BASIC METABOLIC PNL TOTAL CA: CPT

## 2018-08-01 PROCEDURE — 84484 ASSAY OF TROPONIN QUANT: CPT

## 2018-08-01 PROCEDURE — 84100 ASSAY OF PHOSPHORUS: CPT

## 2018-08-01 PROCEDURE — 36569 INSJ PICC 5 YR+ W/O IMAGING: CPT

## 2018-08-01 PROCEDURE — 83690 ASSAY OF LIPASE: CPT

## 2018-08-01 PROCEDURE — 80053 COMPREHEN METABOLIC PANEL: CPT

## 2018-08-01 PROCEDURE — 71045 X-RAY EXAM CHEST 1 VIEW: CPT

## 2018-08-01 PROCEDURE — 82043 UR ALBUMIN QUANTITATIVE: CPT

## 2018-08-01 PROCEDURE — 76937 US GUIDE VASCULAR ACCESS: CPT

## 2018-08-01 PROCEDURE — 84300 ASSAY OF URINE SODIUM: CPT

## 2018-08-01 PROCEDURE — 84155 ASSAY OF PROTEIN SERUM: CPT

## 2018-08-01 PROCEDURE — 84132 ASSAY OF SERUM POTASSIUM: CPT

## 2018-08-01 PROCEDURE — 96374 THER/PROPH/DIAG INJ IV PUSH: CPT

## 2018-08-01 PROCEDURE — 93306 TTE W/DOPPLER COMPLETE: CPT

## 2018-08-01 PROCEDURE — 85025 COMPLETE CBC W/AUTO DIFF WBC: CPT

## 2018-08-01 PROCEDURE — 83605 ASSAY OF LACTIC ACID: CPT

## 2018-08-01 PROCEDURE — 80307 DRUG TEST PRSMV CHEM ANLYZR: CPT

## 2018-08-01 PROCEDURE — 96375 TX/PRO/DX INJ NEW DRUG ADDON: CPT

## 2018-08-01 PROCEDURE — 82962 GLUCOSE BLOOD TEST: CPT

## 2018-08-01 PROCEDURE — 99285 EMERGENCY DEPT VISIT HI MDM: CPT

## 2018-08-01 PROCEDURE — 83735 ASSAY OF MAGNESIUM: CPT

## 2018-08-01 PROCEDURE — 93005 ELECTROCARDIOGRAM TRACING: CPT

## 2018-08-01 PROCEDURE — 84145 PROCALCITONIN (PCT): CPT

## 2018-08-01 PROCEDURE — 81003 URINALYSIS AUTO W/O SCOPE: CPT

## 2018-08-01 RX ADMIN — HALOPERIDOL LACTATE 1 MG: 5 INJECTION, SOLUTION INTRAMUSCULAR at 14:17

## 2018-08-01 RX ADMIN — METOPROLOL TARTRATE 1 MG: 50 TABLET, FILM COATED ORAL at 22:56

## 2018-08-01 RX ADMIN — PANTOPRAZOLE SODIUM 1 MG: 40 TABLET, DELAYED RELEASE ORAL at 18:14

## 2018-08-01 RX ADMIN — HALOPERIDOL LACTATE 1 MG: 5 INJECTION, SOLUTION INTRAMUSCULAR at 14:14

## 2018-08-01 RX ADMIN — PYRIDOXINE HYDROCHLORIDE 1 MLS/HR: 100 INJECTION, SOLUTION INTRAMUSCULAR; INTRAVENOUS at 18:18

## 2018-08-01 RX ADMIN — ALPRAZOLAM 1 MG: 1 TABLET ORAL at 22:55

## 2018-08-01 RX ADMIN — THIAMINE HYDROCHLORIDE 1 MLS/HR: 100 INJECTION, SOLUTION INTRAMUSCULAR; INTRAVENOUS at 22:57

## 2018-08-01 RX ADMIN — SUCRALFATE 1 GM: 1 SUSPENSION ORAL at 20:36

## 2018-08-01 RX ADMIN — ONDANSETRON HYDROCHLORIDE 1 MG: 2 INJECTION, SOLUTION INTRAMUSCULAR; INTRAVENOUS at 12:46

## 2018-08-01 RX ADMIN — THIAMINE HYDROCHLORIDE 1 MLS/HR: 100 INJECTION, SOLUTION INTRAMUSCULAR; INTRAVENOUS at 12:18

## 2018-08-01 RX ADMIN — METOCLOPRAMIDE HYDROCHLORIDE 1 MG: 10 INJECTION, SOLUTION INTRAMUSCULAR; INTRAVENOUS at 18:14

## 2018-08-01 RX ADMIN — METOPROLOL TARTRATE 1 MG: 50 TABLET, FILM COATED ORAL at 18:17

## 2018-08-01 RX ADMIN — TACROLIMUS 1 MG: 1 CAPSULE ORAL at 21:00

## 2018-08-01 RX ADMIN — PROCHLORPERAZINE EDISYLATE 1 MG: 5 INJECTION INTRAMUSCULAR; INTRAVENOUS at 13:15

## 2018-08-01 RX ADMIN — PYRIDOXINE HYDROCHLORIDE 1 MLS/HR: 100 INJECTION, SOLUTION INTRAMUSCULAR; INTRAVENOUS at 12:46

## 2018-08-01 RX ADMIN — METOPROLOL TARTRATE 1 MG: 50 TABLET, FILM COATED ORAL at 15:42

## 2018-08-01 RX ADMIN — SUCRALFATE 1 GM: 1 SUSPENSION ORAL at 22:55

## 2018-08-02 LAB
ADD MAN DIFF?: NO
ANION GAP: 15 (ref 8–16)
BASOPHIL #: 0.1 10^3/UL (ref 0–0.1)
BASOPHILS %: 0.3 % (ref 0–2)
BLOOD UREA NITROGEN: 18 MG/DL (ref 7–20)
CALCIUM: 9.3 MG/DL (ref 8.4–10.2)
CARBON DIOXIDE: 24 MMOL/L (ref 21–31)
CHLORIDE: 107 MMOL/L (ref 97–110)
CREATININE: 1.25 MG/DL (ref 0.61–1.24)
EOSINOPHILS #: 0 10^3/UL (ref 0–0.5)
EOSINOPHILS %: 0 % (ref 0–7)
GLUCOSE: 120 MG/DL (ref 70–220)
HEMATOCRIT: 42.6 % (ref 42–52)
HEMOGLOBIN: 13.9 G/DL (ref 14–18)
LYMPHOCYTES #: 2.2 10^3/UL (ref 0.8–2.9)
LYMPHOCYTES %: 12.4 % (ref 15–51)
MAGNESIUM: 1.3 MG/DL (ref 1.7–2.5)
MEAN CORPUSCULAR HEMOGLOBIN: 30.9 PG (ref 29–33)
MEAN CORPUSCULAR HGB CONC: 32.6 G/DL (ref 32–37)
MEAN CORPUSCULAR VOLUME: 94.7 FL (ref 82–101)
MEAN PLATELET VOLUME: 11.7 FL (ref 7.4–10.4)
MONOCYTE #: 1.5 10^3/UL (ref 0.3–0.9)
MONOCYTES %: 8.3 % (ref 0–11)
NEUTROPHIL #: 14 10^3/UL (ref 1.6–7.5)
NEUTROPHILS %: 78.5 % (ref 39–77)
NUCLEATED RED BLOOD CELLS #: 0 10^3/UL (ref 0–0)
NUCLEATED RED BLOOD CELLS%: 0 /100WBC (ref 0–0)
PHOSPHORUS: 2.9 MG/DL (ref 2.5–4.9)
PLATELET COUNT: 202 10^3/UL (ref 140–415)
POTASSIUM: 4 MMOL/L (ref 3.5–5.1)
RED BLOOD COUNT: 4.5 10^6/UL (ref 4.7–6.1)
RED CELL DISTRIBUTION WIDTH: 14 % (ref 11.5–14.5)
SODIUM: 142 MMOL/L (ref 135–144)
TROPONIN-I: 0.02 NG/ML (ref 0–0.12)
TROPONIN-I: 0.04 NG/ML (ref 0–0.12)
WHITE BLOOD COUNT: 17.8 10^3/UL (ref 4.8–10.8)

## 2018-08-02 PROCEDURE — 02HV33Z INSERTION OF INFUSION DEVICE INTO SUPERIOR VENA CAVA, PERCUTANEOUS APPROACH: ICD-10-PCS

## 2018-08-02 RX ADMIN — SUCRALFATE 1 GM: 1 SUSPENSION ORAL at 12:58

## 2018-08-02 RX ADMIN — MYCOPHENOLIC ACID 1 MG: 180 TABLET, DELAYED RELEASE ORAL at 09:11

## 2018-08-02 RX ADMIN — METOCLOPRAMIDE HYDROCHLORIDE 1 MG: 10 INJECTION, SOLUTION INTRAMUSCULAR; INTRAVENOUS at 17:31

## 2018-08-02 RX ADMIN — DIGOXIN 1 MCG: 0.25 INJECTION INTRAMUSCULAR; INTRAVENOUS at 16:03

## 2018-08-02 RX ADMIN — DILTIAZEM HYDROCHLORIDE 1 MG: 60 TABLET, FILM COATED ORAL at 16:03

## 2018-08-02 RX ADMIN — DILTIAZEM HYDROCHLORIDE 1 MG: 60 TABLET, FILM COATED ORAL at 17:07

## 2018-08-02 RX ADMIN — LIDOCAINE HYDROCHLORIDE 1 ML: 10 INJECTION, SOLUTION EPIDURAL; INFILTRATION; INTRACAUDAL; PERINEURAL at 15:00

## 2018-08-02 RX ADMIN — MYCOPHENOLIC ACID 1 MG: 180 TABLET, DELAYED RELEASE ORAL at 22:25

## 2018-08-02 RX ADMIN — MORPHINE SULFATE 1 MG: 2 INJECTION, SOLUTION INTRAMUSCULAR; INTRAVENOUS at 10:45

## 2018-08-02 RX ADMIN — MAGNESIUM SULFATE HEPTAHYDRATE 1 MLS/HR: 40 INJECTION, SOLUTION INTRAVENOUS at 10:19

## 2018-08-02 RX ADMIN — DILTIAZEM HYDROCHLORIDE 1 MG: 5 INJECTION INTRAVENOUS at 10:55

## 2018-08-02 RX ADMIN — METOPROLOL TARTRATE 1 MG: 50 TABLET, FILM COATED ORAL at 09:11

## 2018-08-02 RX ADMIN — TACROLIMUS 1 MG: 1 CAPSULE ORAL at 22:25

## 2018-08-02 RX ADMIN — MAGNESIUM SULFATE HEPTAHYDRATE 1 MLS/HR: 500 INJECTION, SOLUTION INTRAMUSCULAR; INTRAVENOUS at 16:41

## 2018-08-02 RX ADMIN — DILTIAZEM HCL 125 MG/125ML IN DEXTROSE 5% IV SOLN (1 MG/ML) 1 MLS/HR: 125-5/125 SOLUTION at 13:22

## 2018-08-02 RX ADMIN — DILTIAZEM HCL 125 MG/125ML IN DEXTROSE 5% IV SOLN (1 MG/ML) 1 MLS/HR: 125-5/125 SOLUTION at 11:37

## 2018-08-02 RX ADMIN — SUCRALFATE 1 GM: 1 SUSPENSION ORAL at 17:31

## 2018-08-02 RX ADMIN — MYCOPHENOLIC ACID 1 MG: 180 TABLET, DELAYED RELEASE ORAL at 01:28

## 2018-08-02 RX ADMIN — MYCOPHENOLIC ACID 1 MG: 180 TABLET, DELAYED RELEASE ORAL at 04:45

## 2018-08-02 RX ADMIN — AMLODIPINE BESYLATE 1 MG: 10 TABLET ORAL at 09:12

## 2018-08-02 RX ADMIN — METOCLOPRAMIDE HYDROCHLORIDE 1 MG: 10 INJECTION, SOLUTION INTRAMUSCULAR; INTRAVENOUS at 06:04

## 2018-08-02 RX ADMIN — DIGOXIN 1 MCG: 0.25 INJECTION INTRAMUSCULAR; INTRAVENOUS at 09:12

## 2018-08-02 RX ADMIN — THIAMINE HYDROCHLORIDE 1 MLS/HR: 100 INJECTION, SOLUTION INTRAMUSCULAR; INTRAVENOUS at 03:20

## 2018-08-02 RX ADMIN — PANTOPRAZOLE SODIUM 1 MG: 40 TABLET, DELAYED RELEASE ORAL at 17:31

## 2018-08-02 RX ADMIN — METOCLOPRAMIDE HYDROCHLORIDE 1 MG: 10 INJECTION, SOLUTION INTRAMUSCULAR; INTRAVENOUS at 00:30

## 2018-08-02 RX ADMIN — TACROLIMUS 1 MG: 1 CAPSULE ORAL at 09:11

## 2018-08-02 RX ADMIN — SUCRALFATE 1 GM: 1 SUSPENSION ORAL at 09:10

## 2018-08-02 RX ADMIN — DILTIAZEM HCL 125 MG/125ML IN DEXTROSE 5% IV SOLN (1 MG/ML) 1 MLS/HR: 125-5/125 SOLUTION at 12:47

## 2018-08-02 RX ADMIN — METOPROLOL TARTRATE 1 MG: 50 TABLET, FILM COATED ORAL at 22:26

## 2018-08-02 RX ADMIN — THIAMINE HYDROCHLORIDE 1 MLS/HR: 100 INJECTION, SOLUTION INTRAMUSCULAR; INTRAVENOUS at 16:40

## 2018-08-02 RX ADMIN — PANTOPRAZOLE SODIUM 1 MG: 40 TABLET, DELAYED RELEASE ORAL at 06:04

## 2018-08-02 RX ADMIN — THIAMINE HYDROCHLORIDE 1 MLS/HR: 100 INJECTION, SOLUTION INTRAMUSCULAR; INTRAVENOUS at 22:26

## 2018-08-02 RX ADMIN — SUCRALFATE 1 GM: 1 SUSPENSION ORAL at 22:25

## 2018-08-02 RX ADMIN — THIAMINE HYDROCHLORIDE 1 MLS/HR: 100 INJECTION, SOLUTION INTRAMUSCULAR; INTRAVENOUS at 15:53

## 2018-08-02 RX ADMIN — METOCLOPRAMIDE HYDROCHLORIDE 1 MG: 10 INJECTION, SOLUTION INTRAMUSCULAR; INTRAVENOUS at 12:58

## 2018-08-03 LAB
ADD MAN DIFF?: NO
ANION GAP: 12 (ref 8–16)
BASOPHIL #: 0 10^3/UL (ref 0–0.1)
BASOPHILS %: 0.2 % (ref 0–2)
BLOOD UREA NITROGEN: 15 MG/DL (ref 7–20)
CALCIUM: 8.3 MG/DL (ref 8.4–10.2)
CARBON DIOXIDE: 25 MMOL/L (ref 21–31)
CHLORIDE: 104 MMOL/L (ref 97–110)
CREATININE: 1.08 MG/DL (ref 0.61–1.24)
EOSINOPHILS #: 0 10^3/UL (ref 0–0.5)
EOSINOPHILS %: 0.1 % (ref 0–7)
GLUCOSE: 107 MG/DL (ref 70–220)
HEMATOCRIT: 41.2 % (ref 42–52)
HEMOGLOBIN: 13.7 G/DL (ref 14–18)
LYMPHOCYTES #: 1.6 10^3/UL (ref 0.8–2.9)
LYMPHOCYTES %: 10.7 % (ref 15–51)
MAGNESIUM: 1.9 MG/DL (ref 1.7–2.5)
MEAN CORPUSCULAR HEMOGLOBIN: 31.3 PG (ref 29–33)
MEAN CORPUSCULAR HGB CONC: 33.3 G/DL (ref 32–37)
MEAN CORPUSCULAR VOLUME: 94.1 FL (ref 82–101)
MEAN PLATELET VOLUME: 11.2 FL (ref 7.4–10.4)
MONOCYTE #: 1.3 10^3/UL (ref 0.3–0.9)
MONOCYTES %: 8.5 % (ref 0–11)
NEUTROPHIL #: 11.9 10^3/UL (ref 1.6–7.5)
NEUTROPHILS %: 80.1 % (ref 39–77)
NUCLEATED RED BLOOD CELLS #: 0 10^3/UL (ref 0–0)
NUCLEATED RED BLOOD CELLS%: 0 /100WBC (ref 0–0)
PHOSPHORUS: 2.2 MG/DL (ref 2.5–4.9)
PLATELET COUNT: 186 10^3/UL (ref 140–415)
POTASSIUM: 3 MMOL/L (ref 3.5–5.1)
RED BLOOD COUNT: 4.38 10^6/UL (ref 4.7–6.1)
RED CELL DISTRIBUTION WIDTH: 13.7 % (ref 11.5–14.5)
SODIUM: 138 MMOL/L (ref 135–144)
WHITE BLOOD COUNT: 14.8 10^3/UL (ref 4.8–10.8)

## 2018-08-03 RX ADMIN — SUCRALFATE 1 GM: 1 SUSPENSION ORAL at 09:00

## 2018-08-03 RX ADMIN — THIAMINE HYDROCHLORIDE 1 MLS/HR: 100 INJECTION, SOLUTION INTRAMUSCULAR; INTRAVENOUS at 06:39

## 2018-08-03 RX ADMIN — THIAMINE HYDROCHLORIDE 1 MLS/HR: 100 INJECTION, SOLUTION INTRAMUSCULAR; INTRAVENOUS at 06:00

## 2018-08-03 RX ADMIN — DILTIAZEM HYDROCHLORIDE 1 MG: 60 TABLET, FILM COATED ORAL at 00:46

## 2018-08-03 RX ADMIN — DILTIAZEM HYDROCHLORIDE 1 MG: 120 CAPSULE, COATED, EXTENDED RELEASE ORAL at 12:22

## 2018-08-03 RX ADMIN — TACROLIMUS 1 MG: 1 CAPSULE ORAL at 08:31

## 2018-08-03 RX ADMIN — SUCRALFATE 1 GM: 1 SUSPENSION ORAL at 12:23

## 2018-08-03 RX ADMIN — METOPROLOL TARTRATE 1 MG: 50 TABLET, FILM COATED ORAL at 08:30

## 2018-08-03 RX ADMIN — AMLODIPINE BESYLATE 1 MG: 10 TABLET ORAL at 08:30

## 2018-08-03 RX ADMIN — METOCLOPRAMIDE HYDROCHLORIDE 1 MG: 10 INJECTION, SOLUTION INTRAMUSCULAR; INTRAVENOUS at 00:46

## 2018-08-03 RX ADMIN — METOCLOPRAMIDE HYDROCHLORIDE 1 MG: 10 INJECTION, SOLUTION INTRAMUSCULAR; INTRAVENOUS at 11:37

## 2018-08-03 RX ADMIN — DILTIAZEM HYDROCHLORIDE 1 MG: 120 CAPSULE, COATED, EXTENDED RELEASE ORAL at 22:49

## 2018-08-03 RX ADMIN — METOCLOPRAMIDE HYDROCHLORIDE 1 MG: 10 INJECTION, SOLUTION INTRAMUSCULAR; INTRAVENOUS at 17:13

## 2018-08-03 RX ADMIN — THIAMINE HYDROCHLORIDE 1 MLS/HR: 100 INJECTION, SOLUTION INTRAMUSCULAR; INTRAVENOUS at 16:04

## 2018-08-03 RX ADMIN — SUCRALFATE 1 GM: 1 SUSPENSION ORAL at 19:07

## 2018-08-03 RX ADMIN — SUCRALFATE 1 GM: 1 SUSPENSION ORAL at 21:57

## 2018-08-03 RX ADMIN — METOCLOPRAMIDE HYDROCHLORIDE 1 MG: 10 INJECTION, SOLUTION INTRAMUSCULAR; INTRAVENOUS at 06:33

## 2018-08-03 RX ADMIN — DILTIAZEM HYDROCHLORIDE 1 MG: 60 TABLET, FILM COATED ORAL at 06:34

## 2018-08-03 RX ADMIN — POTASSIUM CHLORIDE 1 MEQ: 1500 TABLET, EXTENDED RELEASE ORAL at 08:28

## 2018-08-03 RX ADMIN — THIAMINE HYDROCHLORIDE 1 MLS/HR: 100 INJECTION, SOLUTION INTRAMUSCULAR; INTRAVENOUS at 09:23

## 2018-08-03 RX ADMIN — METOCLOPRAMIDE HYDROCHLORIDE 1 MG: 10 INJECTION, SOLUTION INTRAMUSCULAR; INTRAVENOUS at 23:38

## 2018-08-03 RX ADMIN — MAGNESIUM SULFATE HEPTAHYDRATE 1 MLS/HR: 40 INJECTION, SOLUTION INTRAVENOUS at 09:54

## 2018-08-03 RX ADMIN — TACROLIMUS 1 MG: 0.5 CAPSULE ORAL at 21:58

## 2018-08-03 RX ADMIN — MYCOPHENOLIC ACID 1 MG: 180 TABLET, DELAYED RELEASE ORAL at 21:57

## 2018-08-03 RX ADMIN — MYCOPHENOLIC ACID 1 MG: 180 TABLET, DELAYED RELEASE ORAL at 08:28

## 2018-08-03 RX ADMIN — PANTOPRAZOLE SODIUM 1 MG: 40 TABLET, DELAYED RELEASE ORAL at 06:34

## 2018-08-03 RX ADMIN — PANTOPRAZOLE SODIUM 1 MG: 40 TABLET, DELAYED RELEASE ORAL at 17:13

## 2018-08-04 LAB
ADD MAN DIFF?: NO
ANION GAP: 11 (ref 8–16)
BASOPHIL #: 0 10^3/UL (ref 0–0.1)
BASOPHILS %: 0.3 % (ref 0–2)
BLOOD UREA NITROGEN: 12 MG/DL (ref 7–20)
CALCIUM: 8.3 MG/DL (ref 8.4–10.2)
CARBON DIOXIDE: 25 MMOL/L (ref 21–31)
CHLORIDE: 104 MMOL/L (ref 97–110)
CREATININE: 1.11 MG/DL (ref 0.61–1.24)
EOSINOPHILS #: 0 10^3/UL (ref 0–0.5)
EOSINOPHILS %: 0.2 % (ref 0–7)
GLUCOSE: 104 MG/DL (ref 70–220)
HEMATOCRIT: 41.1 % (ref 42–52)
HEMOGLOBIN: 13.5 G/DL (ref 14–18)
LYMPHOCYTES #: 1.5 10^3/UL (ref 0.8–2.9)
LYMPHOCYTES %: 13.2 % (ref 15–51)
MAGNESIUM: 1.7 MG/DL (ref 1.7–2.5)
MEAN CORPUSCULAR HEMOGLOBIN: 30.5 PG (ref 29–33)
MEAN CORPUSCULAR HGB CONC: 32.8 G/DL (ref 32–37)
MEAN CORPUSCULAR VOLUME: 92.8 FL (ref 82–101)
MEAN PLATELET VOLUME: 11.4 FL (ref 7.4–10.4)
MONOCYTE #: 1.3 10^3/UL (ref 0.3–0.9)
MONOCYTES %: 11.8 % (ref 0–11)
NEUTROPHIL #: 8.4 10^3/UL (ref 1.6–7.5)
NEUTROPHILS %: 74 % (ref 39–77)
NUCLEATED RED BLOOD CELLS #: 0 10^3/UL (ref 0–0)
NUCLEATED RED BLOOD CELLS%: 0 /100WBC (ref 0–0)
PHOSPHORUS: 2 MG/DL (ref 2.5–4.9)
PLATELET COUNT: 169 10^3/UL (ref 140–415)
POTASSIUM: 2.9 MMOL/L (ref 3.5–5.1)
RED BLOOD COUNT: 4.43 10^6/UL (ref 4.7–6.1)
RED CELL DISTRIBUTION WIDTH: 13.6 % (ref 11.5–14.5)
SODIUM: 137 MMOL/L (ref 135–144)
WHITE BLOOD COUNT: 11.3 10^3/UL (ref 4.8–10.8)

## 2018-08-04 RX ADMIN — METOCLOPRAMIDE HYDROCHLORIDE 1 MG: 10 INJECTION, SOLUTION INTRAMUSCULAR; INTRAVENOUS at 12:05

## 2018-08-04 RX ADMIN — SUCRALFATE 1 GM: 1 SUSPENSION ORAL at 17:31

## 2018-08-04 RX ADMIN — MYCOPHENOLIC ACID 1 MG: 180 TABLET, DELAYED RELEASE ORAL at 09:46

## 2018-08-04 RX ADMIN — DILTIAZEM HYDROCHLORIDE 1 MG: 120 CAPSULE, COATED, EXTENDED RELEASE ORAL at 09:47

## 2018-08-04 RX ADMIN — MYCOPHENOLIC ACID 1 MG: 180 TABLET, DELAYED RELEASE ORAL at 20:40

## 2018-08-04 RX ADMIN — DILTIAZEM HYDROCHLORIDE 1 MG: 120 CAPSULE, COATED, EXTENDED RELEASE ORAL at 20:40

## 2018-08-04 RX ADMIN — SUCRALFATE 1 GM: 1 SUSPENSION ORAL at 20:40

## 2018-08-04 RX ADMIN — POTASSIUM & SODIUM PHOSPHATES POWDER PACK 280-160-250 MG 1 MG: 280-160-250 PACK at 09:45

## 2018-08-04 RX ADMIN — MAGNESIUM SULFATE HEPTAHYDRATE 1 MLS/HR: 500 INJECTION, SOLUTION INTRAMUSCULAR; INTRAVENOUS at 12:05

## 2018-08-04 RX ADMIN — METOCLOPRAMIDE HYDROCHLORIDE 1 MG: 10 INJECTION, SOLUTION INTRAMUSCULAR; INTRAVENOUS at 17:31

## 2018-08-04 RX ADMIN — AMLODIPINE BESYLATE 1 MG: 5 TABLET ORAL at 09:47

## 2018-08-04 RX ADMIN — PANTOPRAZOLE SODIUM 1 MG: 40 TABLET, DELAYED RELEASE ORAL at 05:56

## 2018-08-04 RX ADMIN — PANTOPRAZOLE SODIUM 1 MG: 40 TABLET, DELAYED RELEASE ORAL at 17:31

## 2018-08-04 RX ADMIN — TACROLIMUS 1 MG: 0.5 CAPSULE ORAL at 09:46

## 2018-08-04 RX ADMIN — SUCRALFATE 1 GM: 1 SUSPENSION ORAL at 09:45

## 2018-08-04 RX ADMIN — POTASSIUM CHLORIDE 1 MEQ: 1500 TABLET, EXTENDED RELEASE ORAL at 09:46

## 2018-08-04 RX ADMIN — METOCLOPRAMIDE HYDROCHLORIDE 1 MG: 10 INJECTION, SOLUTION INTRAMUSCULAR; INTRAVENOUS at 05:56

## 2018-08-04 RX ADMIN — METOCLOPRAMIDE HYDROCHLORIDE 1 MG: 10 INJECTION, SOLUTION INTRAMUSCULAR; INTRAVENOUS at 23:08

## 2018-08-04 RX ADMIN — TACROLIMUS 1 MG: 0.5 CAPSULE ORAL at 20:40

## 2018-08-04 RX ADMIN — SUCRALFATE 1 GM: 1 SUSPENSION ORAL at 12:05

## 2018-08-05 LAB
ADD MAN DIFF?: NO
ALANINE AMINOTRANSFERASE: 25 IU/L (ref 13–69)
ALBUMIN/GLOBULIN RATIO: 1.44
ALBUMIN: 3.6 G/DL (ref 3.3–4.9)
ALKALINE PHOSPHATASE: 52 IU/L (ref 42–121)
ANION GAP: 11 (ref 8–16)
ASPARTATE AMINO TRANSFERASE: 17 IU/L (ref 15–46)
BASOPHIL #: 0 10^3/UL (ref 0–0.1)
BASOPHILS %: 0.4 % (ref 0–2)
BILIRUBIN,DIRECT: 0 MG/DL (ref 0–0.2)
BILIRUBIN,TOTAL: 1.1 MG/DL (ref 0.2–1.3)
BLOOD UREA NITROGEN: 15 MG/DL (ref 7–20)
CALCIUM: 8.7 MG/DL (ref 8.4–10.2)
CARBON DIOXIDE: 27 MMOL/L (ref 21–31)
CHLORIDE: 104 MMOL/L (ref 97–110)
CREATININE: 1.17 MG/DL (ref 0.61–1.24)
EOSINOPHILS #: 0 10^3/UL (ref 0–0.5)
EOSINOPHILS %: 0.4 % (ref 0–7)
GLOBULIN: 2.5 G/DL (ref 1.3–3.2)
GLUCOSE: 98 MG/DL (ref 70–220)
HEMATOCRIT: 42.8 % (ref 42–52)
HEMOGLOBIN: 14.2 G/DL (ref 14–18)
LYMPHOCYTES #: 2.3 10^3/UL (ref 0.8–2.9)
LYMPHOCYTES %: 22 % (ref 15–51)
MAGNESIUM: 1.8 MG/DL (ref 1.7–2.5)
MEAN CORPUSCULAR HEMOGLOBIN: 30.7 PG (ref 29–33)
MEAN CORPUSCULAR HGB CONC: 33.2 G/DL (ref 32–37)
MEAN CORPUSCULAR VOLUME: 92.4 FL (ref 82–101)
MEAN PLATELET VOLUME: 11.3 FL (ref 7.4–10.4)
MONOCYTE #: 1.2 10^3/UL (ref 0.3–0.9)
MONOCYTES %: 11.8 % (ref 0–11)
NEUTROPHIL #: 6.7 10^3/UL (ref 1.6–7.5)
NEUTROPHILS %: 65.1 % (ref 39–77)
NUCLEATED RED BLOOD CELLS #: 0 10^3/UL (ref 0–0)
NUCLEATED RED BLOOD CELLS%: 0 /100WBC (ref 0–0)
PHOSPHORUS: 2.4 MG/DL (ref 2.5–4.9)
PLATELET COUNT: 184 10^3/UL (ref 140–415)
POTASSIUM: 3.1 MMOL/L (ref 3.5–5.1)
POTASSIUM: 3.8 MMOL/L (ref 3.5–5.1)
RED BLOOD COUNT: 4.63 10^6/UL (ref 4.7–6.1)
RED CELL DISTRIBUTION WIDTH: 13.6 % (ref 11.5–14.5)
SODIUM: 139 MMOL/L (ref 135–144)
TOTAL PROTEIN: 6.1 G/DL (ref 6.1–8.1)
WHITE BLOOD COUNT: 10.3 10^3/UL (ref 4.8–10.8)

## 2018-08-05 RX ADMIN — PANTOPRAZOLE SODIUM 1 MG: 40 TABLET, DELAYED RELEASE ORAL at 05:59

## 2018-08-05 RX ADMIN — MAGNESIUM SULFATE HEPTAHYDRATE 1 MLS/HR: 40 INJECTION, SOLUTION INTRAVENOUS at 10:16

## 2018-08-05 RX ADMIN — METOCLOPRAMIDE HYDROCHLORIDE 1 MG: 10 INJECTION, SOLUTION INTRAMUSCULAR; INTRAVENOUS at 05:59

## 2018-08-05 RX ADMIN — SUCRALFATE 1 GM: 1 SUSPENSION ORAL at 09:05

## 2018-08-05 RX ADMIN — DILTIAZEM HYDROCHLORIDE 1 MG: 120 CAPSULE, COATED, EXTENDED RELEASE ORAL at 09:00

## 2018-08-05 RX ADMIN — AMLODIPINE BESYLATE 1 MG: 5 TABLET ORAL at 09:06

## 2018-08-05 RX ADMIN — MYCOPHENOLIC ACID 1 MG: 180 TABLET, DELAYED RELEASE ORAL at 09:06

## 2018-08-05 RX ADMIN — DILTIAZEM HYDROCHLORIDE 1 MG: 120 CAPSULE, COATED, EXTENDED RELEASE ORAL at 12:18

## 2018-08-05 RX ADMIN — SUCRALFATE 1 GM: 1 SUSPENSION ORAL at 12:18

## 2018-08-05 RX ADMIN — METOCLOPRAMIDE HYDROCHLORIDE 1 MG: 10 INJECTION, SOLUTION INTRAMUSCULAR; INTRAVENOUS at 12:18

## 2018-08-05 RX ADMIN — TACROLIMUS 1 MG: 0.5 CAPSULE ORAL at 09:06

## 2018-08-05 RX ADMIN — POTASSIUM CHLORIDE 1 MEQ: 1500 TABLET, EXTENDED RELEASE ORAL at 09:15

## 2018-08-12 LAB
MICROALBUMIN/CREATININE RATIO: 71 (ref ?–30)
MICROALBUMIN: 5.8 MG/DL
PROCALCITONIN: <0.1 NG/ML (ref ?–0.1)

## 2019-04-01 ENCOUNTER — HOSPITAL ENCOUNTER (INPATIENT)
Dept: HOSPITAL 10 - E/R | Age: 54
LOS: 4 days | Discharge: HOME | DRG: 381 | End: 2019-04-05
Attending: INTERNAL MEDICINE | Admitting: INTERNAL MEDICINE
Payer: MEDICARE

## 2019-04-01 ENCOUNTER — HOSPITAL ENCOUNTER (INPATIENT)
Dept: HOSPITAL 91 - MS1 | Age: 54
LOS: 4 days | Discharge: HOME | DRG: 381 | End: 2019-04-05
Payer: MEDICARE

## 2019-04-01 VITALS
HEIGHT: 70 IN | BODY MASS INDEX: 21.46 KG/M2 | WEIGHT: 149.91 LBS | BODY MASS INDEX: 21.46 KG/M2 | HEIGHT: 70 IN | WEIGHT: 149.91 LBS

## 2019-04-01 DIAGNOSIS — T86.891: ICD-10-CM

## 2019-04-01 DIAGNOSIS — G43.A0: ICD-10-CM

## 2019-04-01 DIAGNOSIS — R65.10: ICD-10-CM

## 2019-04-01 DIAGNOSIS — N17.9: ICD-10-CM

## 2019-04-01 DIAGNOSIS — Z79.52: ICD-10-CM

## 2019-04-01 DIAGNOSIS — Z94.83: ICD-10-CM

## 2019-04-01 DIAGNOSIS — I10: ICD-10-CM

## 2019-04-01 DIAGNOSIS — K44.9: ICD-10-CM

## 2019-04-01 DIAGNOSIS — F12.90: ICD-10-CM

## 2019-04-01 DIAGNOSIS — K29.70: ICD-10-CM

## 2019-04-01 DIAGNOSIS — R00.0: ICD-10-CM

## 2019-04-01 DIAGNOSIS — Z94.0: ICD-10-CM

## 2019-04-01 DIAGNOSIS — F41.9: ICD-10-CM

## 2019-04-01 DIAGNOSIS — K22.10: Primary | ICD-10-CM

## 2019-04-01 LAB
ABNORMAL IP MESSAGE: 1
ADD MAN DIFF?: NO
ALANINE AMINOTRANSFERASE: 21 IU/L (ref 13–69)
ALBUMIN/GLOBULIN RATIO: 1.65
ALBUMIN: 4.3 G/DL (ref 3.3–4.9)
ALKALINE PHOSPHATASE: 57 IU/L (ref 42–121)
ANION GAP: 14 (ref 5–13)
ASPARTATE AMINO TRANSFERASE: 17 IU/L (ref 15–46)
BASOPHIL #: 0.1 10^3/UL (ref 0–0.1)
BASOPHILS %: 0.4 % (ref 0–2)
BILIRUBIN,DIRECT: 0 MG/DL (ref 0–0.2)
BILIRUBIN,TOTAL: 0.6 MG/DL (ref 0.2–1.3)
BLOOD UREA NITROGEN: 24 MG/DL (ref 7–20)
CALCIUM: 10 MG/DL (ref 8.4–10.2)
CARBON DIOXIDE: 24 MMOL/L (ref 21–31)
CHLORIDE: 106 MMOL/L (ref 97–110)
CREATININE: 2.9 MG/DL (ref 0.61–1.24)
EOSINOPHILS #: 0 10^3/UL (ref 0–0.5)
EOSINOPHILS %: 0.1 % (ref 0–7)
GLOBULIN: 2.6 G/DL (ref 1.3–3.2)
GLUCOSE: 132 MG/DL (ref 70–220)
HEMATOCRIT: 42.5 % (ref 42–52)
HEMOGLOBIN: 14.1 G/DL (ref 14–18)
IMMATURE GRANS #M: 0.13 10^3/UL (ref 0–0.03)
IMMATURE GRANS % (M): 0.8 % (ref 0–0.43)
LIPASE: 21 U/L (ref 23–300)
LYMPHOCYTES #: 1.1 10^3/UL (ref 0.8–2.9)
LYMPHOCYTES %: 6.2 % (ref 15–51)
MEAN CORPUSCULAR HEMOGLOBIN: 31.3 PG (ref 29–33)
MEAN CORPUSCULAR HGB CONC: 33.2 G/DL (ref 32–37)
MEAN CORPUSCULAR VOLUME: 94.2 FL (ref 82–101)
MEAN PLATELET VOLUME: 10.8 FL (ref 7.4–10.4)
MONOCYTE #: 1.6 10^3/UL (ref 0.3–0.9)
MONOCYTES %: 9.5 % (ref 0–11)
NEUTROPHIL #: 14.3 10^3/UL (ref 1.6–7.5)
NEUTROPHILS %: 83 % (ref 39–77)
NUCLEATED RED BLOOD CELLS #: 0 10^3/UL (ref 0–0)
NUCLEATED RED BLOOD CELLS%: 0 /100WBC (ref 0–0)
PLATELET COUNT: 192 10^3/UL (ref 140–415)
POSITIVE DIFF: (no result)
POTASSIUM: 3.7 MMOL/L (ref 3.5–5.1)
RED BLOOD COUNT: 4.51 10^6/UL (ref 4.7–6.1)
RED CELL DISTRIBUTION WIDTH: 14 % (ref 11.5–14.5)
SODIUM: 144 MMOL/L (ref 135–144)
TOTAL PROTEIN: 6.9 G/DL (ref 6.1–8.1)
WHITE BLOOD COUNT: 17.2 10^3/UL (ref 4.8–10.8)

## 2019-04-01 PROCEDURE — 80053 COMPREHEN METABOLIC PANEL: CPT

## 2019-04-01 PROCEDURE — 78264 GASTRIC EMPTYING IMG STUDY: CPT

## 2019-04-01 PROCEDURE — 85025 COMPLETE CBC W/AUTO DIFF WBC: CPT

## 2019-04-01 PROCEDURE — 84100 ASSAY OF PHOSPHORUS: CPT

## 2019-04-01 PROCEDURE — 80048 BASIC METABOLIC PNL TOTAL CA: CPT

## 2019-04-01 PROCEDURE — 81003 URINALYSIS AUTO W/O SCOPE: CPT

## 2019-04-01 PROCEDURE — 83735 ASSAY OF MAGNESIUM: CPT

## 2019-04-01 PROCEDURE — 88312 SPECIAL STAINS GROUP 1: CPT

## 2019-04-01 PROCEDURE — 81001 URINALYSIS AUTO W/SCOPE: CPT

## 2019-04-01 PROCEDURE — 84300 ASSAY OF URINE SODIUM: CPT

## 2019-04-01 PROCEDURE — G0378 HOSPITAL OBSERVATION PER HR: HCPCS

## 2019-04-01 PROCEDURE — 74176 CT ABD & PELVIS W/O CONTRAST: CPT

## 2019-04-01 PROCEDURE — 96374 THER/PROPH/DIAG INJ IV PUSH: CPT

## 2019-04-01 PROCEDURE — 96375 TX/PRO/DX INJ NEW DRUG ADDON: CPT

## 2019-04-01 PROCEDURE — 83690 ASSAY OF LIPASE: CPT

## 2019-04-01 PROCEDURE — 84155 ASSAY OF PROTEIN SERUM: CPT

## 2019-04-01 PROCEDURE — 82043 UR ALBUMIN QUANTITATIVE: CPT

## 2019-04-01 PROCEDURE — 88305 TISSUE EXAM BY PATHOLOGIST: CPT

## 2019-04-01 PROCEDURE — 99285 EMERGENCY DEPT VISIT HI MDM: CPT

## 2019-04-01 PROCEDURE — C9113 INJ PANTOPRAZOLE SODIUM, VIA: HCPCS

## 2019-04-01 PROCEDURE — A9541 TC99M SULFUR COLLOID: HCPCS

## 2019-04-01 RX ADMIN — ONDANSETRON HYDROCHLORIDE 1 MG: 2 INJECTION, SOLUTION INTRAMUSCULAR; INTRAVENOUS at 22:21

## 2019-04-01 RX ADMIN — THIAMINE HYDROCHLORIDE 1 MLS/HR: 100 INJECTION, SOLUTION INTRAMUSCULAR; INTRAVENOUS at 22:22

## 2019-04-02 VITALS — HEART RATE: 100 BPM | DIASTOLIC BLOOD PRESSURE: 85 MMHG | RESPIRATION RATE: 18 BRPM | SYSTOLIC BLOOD PRESSURE: 137 MMHG

## 2019-04-02 VITALS — SYSTOLIC BLOOD PRESSURE: 138 MMHG | RESPIRATION RATE: 20 BRPM | HEART RATE: 105 BPM | DIASTOLIC BLOOD PRESSURE: 67 MMHG

## 2019-04-02 VITALS — DIASTOLIC BLOOD PRESSURE: 83 MMHG | SYSTOLIC BLOOD PRESSURE: 125 MMHG | RESPIRATION RATE: 19 BRPM | HEART RATE: 121 BPM

## 2019-04-02 VITALS — RESPIRATION RATE: 18 BRPM | DIASTOLIC BLOOD PRESSURE: 74 MMHG | HEART RATE: 98 BPM | SYSTOLIC BLOOD PRESSURE: 128 MMHG

## 2019-04-02 LAB
ADD MAN DIFF?: NO
ALANINE AMINOTRANSFERASE: 22 IU/L (ref 13–69)
ALBUMIN/GLOBULIN RATIO: 1.61
ALBUMIN: 4.2 G/DL (ref 3.3–4.9)
ALKALINE PHOSPHATASE: 53 IU/L (ref 42–121)
ANION GAP: 11 (ref 5–13)
ANION GAP: 14 (ref 5–13)
ASPARTATE AMINO TRANSFERASE: 18 IU/L (ref 15–46)
BASOPHIL #: 0.1 10^3/UL (ref 0–0.1)
BASOPHILS %: 0.3 % (ref 0–2)
BILIRUBIN,DIRECT: 0 MG/DL (ref 0–0.2)
BILIRUBIN,TOTAL: 0.5 MG/DL (ref 0.2–1.3)
BLOOD UREA NITROGEN: 25 MG/DL (ref 7–20)
BLOOD UREA NITROGEN: 25 MG/DL (ref 7–20)
CALCIUM: 9.5 MG/DL (ref 8.4–10.2)
CALCIUM: 9.6 MG/DL (ref 8.4–10.2)
CARBON DIOXIDE: 24 MMOL/L (ref 21–31)
CARBON DIOXIDE: 24 MMOL/L (ref 21–31)
CHLORIDE: 107 MMOL/L (ref 97–110)
CHLORIDE: 109 MMOL/L (ref 97–110)
CREATININE: 2.22 MG/DL (ref 0.61–1.24)
CREATININE: 2.38 MG/DL (ref 0.61–1.24)
EOSINOPHILS #: 0 10^3/UL (ref 0–0.5)
EOSINOPHILS %: 0 % (ref 0–7)
GLOBULIN: 2.6 G/DL (ref 1.3–3.2)
GLUCOSE: 108 MG/DL (ref 70–220)
GLUCOSE: 130 MG/DL (ref 70–220)
HEMATOCRIT: 41.1 % (ref 42–52)
HEMOGLOBIN: 13.5 G/DL (ref 14–18)
IMMATURE GRANS #M: 0.11 10^3/UL (ref 0–0.03)
IMMATURE GRANS % (M): 0.7 % (ref 0–0.43)
LYMPHOCYTES #: 1.2 10^3/UL (ref 0.8–2.9)
LYMPHOCYTES %: 7.8 % (ref 15–51)
MEAN CORPUSCULAR HEMOGLOBIN: 30.6 PG (ref 29–33)
MEAN CORPUSCULAR HGB CONC: 32.8 G/DL (ref 32–37)
MEAN CORPUSCULAR VOLUME: 93.2 FL (ref 82–101)
MEAN PLATELET VOLUME: 10.9 FL (ref 7.4–10.4)
MONOCYTE #: 1 10^3/UL (ref 0.3–0.9)
MONOCYTES %: 6.6 % (ref 0–11)
NEUTROPHIL #: 13.2 10^3/UL (ref 1.6–7.5)
NEUTROPHILS %: 84.6 % (ref 39–77)
NUCLEATED RED BLOOD CELLS #: 0 10^3/UL (ref 0–0)
NUCLEATED RED BLOOD CELLS%: 0 /100WBC (ref 0–0)
PLATELET COUNT: 203 10^3/UL (ref 140–415)
POTASSIUM: 3.9 MMOL/L (ref 3.5–5.1)
POTASSIUM: 4.1 MMOL/L (ref 3.5–5.1)
RED BLOOD COUNT: 4.41 10^6/UL (ref 4.7–6.1)
RED CELL DISTRIBUTION WIDTH: 14.3 % (ref 11.5–14.5)
SODIUM: 144 MMOL/L (ref 135–144)
SODIUM: 145 MMOL/L (ref 135–144)
TOTAL PROTEIN: 6.8 G/DL (ref 6.1–8.1)
WHITE BLOOD COUNT: 15.6 10^3/UL (ref 4.8–10.8)

## 2019-04-02 RX ADMIN — MYCOPHENOLIC ACID SCH MG: 180 TABLET, DELAYED RELEASE ORAL at 20:22

## 2019-04-02 RX ADMIN — MYCOPHENOLIC ACID SCH MG: 180 TABLET, DELAYED RELEASE ORAL at 08:33

## 2019-04-02 RX ADMIN — THIAMINE HYDROCHLORIDE 1 MLS/HR: 100 INJECTION, SOLUTION INTRAMUSCULAR; INTRAVENOUS at 01:45

## 2019-04-02 RX ADMIN — ONDANSETRON HYDROCHLORIDE 1 MG: 2 INJECTION, SOLUTION INTRAMUSCULAR; INTRAVENOUS at 01:45

## 2019-04-02 RX ADMIN — MYCOPHENOLIC ACID 1 MG: 180 TABLET, DELAYED RELEASE ORAL at 08:33

## 2019-04-02 RX ADMIN — THIAMINE HYDROCHLORIDE 1 MLS/HR: 100 INJECTION, SOLUTION INTRAMUSCULAR; INTRAVENOUS at 14:43

## 2019-04-02 RX ADMIN — TACROLIMUS SCH MG: 1 CAPSULE ORAL at 08:33

## 2019-04-02 RX ADMIN — ONDANSETRON HYDROCHLORIDE 1 MG: 2 INJECTION, SOLUTION INTRAMUSCULAR; INTRAVENOUS at 20:21

## 2019-04-02 RX ADMIN — TACROLIMUS 1 MG: 1 CAPSULE ORAL at 08:33

## 2019-04-02 RX ADMIN — PANTOPRAZOLE SODIUM 1 MG: 40 INJECTION, POWDER, FOR SOLUTION INTRAVENOUS at 17:26

## 2019-04-02 RX ADMIN — THIAMINE HYDROCHLORIDE 1 MLS/HR: 100 INJECTION, SOLUTION INTRAMUSCULAR; INTRAVENOUS at 04:03

## 2019-04-02 RX ADMIN — TACROLIMUS SCH MG: 1 CAPSULE ORAL at 20:22

## 2019-04-02 RX ADMIN — PROCHLORPERAZINE EDISYLATE PRN MG: 5 INJECTION INTRAMUSCULAR; INTRAVENOUS at 20:21

## 2019-04-02 RX ADMIN — FOLIC ACID SCH MLS/HR: 5 INJECTION, SOLUTION INTRAMUSCULAR; INTRAVENOUS; SUBCUTANEOUS at 04:03

## 2019-04-02 RX ADMIN — ALPRAZOLAM PRN MG: 1 TABLET ORAL at 08:39

## 2019-04-02 RX ADMIN — MORPHINE SULFATE PRN MG: 2 INJECTION, SOLUTION INTRAMUSCULAR; INTRAVENOUS at 05:47

## 2019-04-02 RX ADMIN — TACROLIMUS 1 MG: 1 CAPSULE ORAL at 20:22

## 2019-04-02 RX ADMIN — ONDANSETRON HYDROCHLORIDE 1 MG: 2 INJECTION, SOLUTION INTRAMUSCULAR; INTRAVENOUS at 17:23

## 2019-04-02 RX ADMIN — ALPRAZOLAM 1 MG: 1 TABLET ORAL at 08:39

## 2019-04-02 RX ADMIN — LORAZEPAM 1 MG: 2 INJECTION, SOLUTION INTRAMUSCULAR; INTRAVENOUS at 01:55

## 2019-04-02 RX ADMIN — AMLODIPINE BESYLATE SCH MG: 10 TABLET ORAL at 08:33

## 2019-04-02 RX ADMIN — DEXAMETHASONE SODIUM PHOSPHATE PRN MG: 10 INJECTION, SOLUTION INTRAMUSCULAR; INTRAVENOUS at 17:23

## 2019-04-02 RX ADMIN — MYCOPHENOLIC ACID 1 MG: 180 TABLET, DELAYED RELEASE ORAL at 20:22

## 2019-04-02 RX ADMIN — AMLODIPINE BESYLATE 1 MG: 10 TABLET ORAL at 08:33

## 2019-04-02 RX ADMIN — DEXAMETHASONE SODIUM PHOSPHATE PRN MG: 10 INJECTION, SOLUTION INTRAMUSCULAR; INTRAVENOUS at 20:21

## 2019-04-02 RX ADMIN — PANTOPRAZOLE SODIUM SCH MG: 40 INJECTION, POWDER, FOR SOLUTION INTRAVENOUS at 17:26

## 2019-04-02 RX ADMIN — MORPHINE SULFATE 1 MG: 2 INJECTION, SOLUTION INTRAMUSCULAR; INTRAVENOUS at 05:47

## 2019-04-02 RX ADMIN — PROCHLORPERAZINE EDISYLATE 1 MG: 5 INJECTION INTRAMUSCULAR; INTRAVENOUS at 20:21

## 2019-04-02 RX ADMIN — FOLIC ACID SCH MLS/HR: 5 INJECTION, SOLUTION INTRAMUSCULAR; INTRAVENOUS; SUBCUTANEOUS at 14:43

## 2019-04-02 RX ADMIN — DEXAMETHASONE SODIUM PHOSPHATE PRN MG: 10 INJECTION, SOLUTION INTRAMUSCULAR; INTRAVENOUS at 04:04

## 2019-04-02 RX ADMIN — PANTOPRAZOLE SODIUM 1 MG: 40 TABLET, DELAYED RELEASE ORAL at 06:00

## 2019-04-02 RX ADMIN — ONDANSETRON HYDROCHLORIDE 1 MG: 2 INJECTION, SOLUTION INTRAMUSCULAR; INTRAVENOUS at 04:04

## 2019-04-02 NOTE — CONS
DATE OF ADMISSION: 04/02/2019

DATE OF CONSULTATION:  04/02/2019

 

 

 

TYPE OF CONSULTATION:  Infectious Disease.

 

REASON FOR CONSULTATION:  Antibiotic management.

 

HISTORY OF PRESENT ILLNESS:  Jasvir Hopper is a 53-year-old male who was admitted with nausea and vomiti
ng for 2 days' duration.  He has had 8 to 10 episodes of vomiting prior to admission, nonbloody, nonb
ilious with intractable vomiting secondary to a likely cyclic vomiting syndrome in the past.  The pat
christiane has been seen multiple times in the emergency room.

 

PAST MEDICAL HISTORY:  Include:

1.  Hypertension.

2.  He has a pancreas and kidney recipient transplant from transplant surgery.  He also has atrial fi
brillation and uses medical marijuana.

 

FAMILY HISTORY:  Noncontributory.

 

SOCIAL HISTORY:  He drank 2 glasses of wine 2 days ago and uses medical marijuana.  He does not smoke
 or otherwise abuse drugs.

 

ALLERGIES:  NONE TO PENICILLIN, SULFA OR FOODS.

 

MEDICATIONS:  Per chart.

 

REVIEW OF SYSTEMS:  As per HPI.

 

PHYSICAL EXAMINATION:

GENERAL:  He is a well-developed, well-nourished male in no acute distress.

VITAL SIGNS:  Stable.  He is afebrile.

SKIN:  Without generalized rash.

HEENT:  Within normal limits.

NECK:  Supple.

LYMPH NODES:  None palpable.

CHEST:  Decreased breath sounds at the bases.

HEART:  Without murmur or gallop.

ABDOMEN:  Soft, nontender, without organomegaly or splenomegaly or masses.

EXTREMITIES:  Without cyanosis, clubbing, or edema.

RECTAL AND GENITAL:  Deferred.

NEUROLOGIC:  No focal neurological abnormalities.

 

ANCILLARY LABORATORY DATA:  White count of 17.2 on admission with 83% neutrophils, H and H 14.1 and 4
2.5, platelet count 192,000.  BUN and creatinine 24/2.90, glucose of 132.  The patient was started on
 Zofran and Reglan and chest x-ray showed no acute abnormalities.  A CT scan of the abdomen and pelvi
s shows atherosclerosis, atrial atrophic native kidneys, multiple bilateral nonobstructing native daniella
al calculi, left pelvic renal transplant again seen mild nonspecific renal transplant hydronephrosis,
 again seen mild enlargement of prostate, large right hydrocele, colonic diverticulosis, no evidence 
of acute diverticulitis.  White count today is 15.6.  The patient is on Prograf or tacrolimus and pre
dnisone and currently off antibiotic therapy.

 

IMPRESSION AND PLAN:  At this point, we will continue to observe him.  He is afebrile.  I will dictat
e my findings to Dr. Carnes and to Dr. Bell.  The patient has end-stage renal disease.  He h
as systemic inflammatory response syndrome with leukocytosis.  No obvious infection.  Procalcitonin l
evels will be checked for antibiotic therapy at this time.  I will dictate my findings to as noted Dr Paulino Bell.

 

 

Dictated By: JERROLD DREYER MD JD/BIANCA

DD:    04/02/2019 10:36:07

DT:    04/02/2019 11:38:43

Conf#: [id number]

DID#:  [id number]

CC: SOFIA CARNES MD; JACKIE BELL DO; JERMAIN SOSA MD;*EndCC*

## 2019-04-02 NOTE — ERD
ER Documentation


Chief Complaint


Chief Complaint





nausea x 2 days, vomiting since 0800 despites MJ use to control n/v.





HPI


This is a 53-year-old male with nausea and vomiting for 2 days.  He said 8-10 


episodes of vomiting.  Nonbloody.  Nonbilious.  No fevers or chills.  History of


intractable vomiting secondary to likely cyclic vomiting syndrome in the past.  


Multiple previous episodes and visits to the ER.





ROS


All systems reviewed and are negative except as per history of present illness.





Medications


Home Meds


Active Scripts


Pantoprazole* (Protonix*) 40 Mg Tablet.dr, 40 MG PO BID, #60 TAB


   Prov:SOFIA CARNES MD         2/24/18


Reported Medications


Mycophenolate Sodium* (Mycophenolic Acid*) 360 Mg Tablet.dr, 360 MG PO BID, TAB


   2/21/18


Tacrolimus* (Tacrolimus*) 1 Mg Capsule, 2 MG PO BID, CAP


   2/21/18


Amlodipine Besylate* (Amlodipine Besylate*) 10 Mg Tablet, 10 MG PO DAILY, #30 


TAB


   2/21/18


Prednisone* (Prednisone*) 5 Mg Tab, 5 MG PO DAILY, TAB


   2/21/18


Alprazolam* (Alprazolam*) 1 Mg Tablet, 1 MG PO TID PRN for ANXIETY, TAB


   2/21/18





Allergies


Allergies:  


Coded Allergies:  


     No Known Allergies (Verified  Allergy, Unknown, 8/1/18)





PMhx/Soc


History of Surgery:  Yes (pancreas, kidney reciepient transplant Sx.)


Anesthesia Reaction:  No


Hx Neurological Disorder:  No


Hx Respiratory Disorders:  No


Hx Cardiac Disorders:  Yes (AFIB)


Hx Psychiatric Problems:  No


Hx Miscellaneous Medical Probl:  No


Hx Alcohol Use:  Yes (2 glasses of wine 2 days ago)


Hx Substance Use:  Yes (Medical marijuana)


Hx Tobacco Use:  No


Smoking Status:  Never smoker





Physical Exam


Vitals





Vital Signs


  Date      Temp  Pulse  Resp  B/P (MAP)   Pulse Ox  O2          O2 Flow    FiO2


Time                                                 Delivery    Rate


    4/1/19  98.0    114    20      132/85        99


     21:17                          (101)





Physical Exam


Const:   No acute distress


Head:   Atraumatic 


Eyes:    Normal Conjunctiva


ENT:    Normal External Ears, Nose and Mouth.


Neck:               Full range of motion. No meningismus.


Resp:   Clear to auscultation bilaterally


Cardio:   Regular rate and rhythm, no murmurs


Abd:    Soft, non tender, non distended. Normal bowel sounds


Skin:   No petechiae or rashes


Back:   No midline or flank tenderness


Ext:    No cyanosis, or edema


Neur:   Awake and alert


Psych:    Normal Mood and Affect


Result Diagram:  


4/1/19 2227 4/1/19 2227





Results 24 hrs





Laboratory Tests


              Test
                                  4/1/19
22:27


              White Blood Count                     17.2 10^3/ul


              Red Blood Count                       4.51 10^6/ul


              Hemoglobin                               14.1 g/dl


              Hematocrit                                  42.5 %


              Mean Corpuscular Volume                    94.2 fl


              Mean Corpuscular Hemoglobin                31.3 pg


              Mean Corpuscular Hemoglobin
Concent     33.2 g/dl 



              Red Cell Distribution Width                 14.0 %


              Platelet Count                         192 10^3/UL


              Mean Platelet Volume                       10.8 fl


              Immature Granulocytes %                    0.800 %


              Neutrophils %                               83.0 %


              Lymphocytes %                                6.2 %


              Monocytes %                                  9.5 %


              Eosinophils %                                0.1 %


              Basophils %                                  0.4 %


              Nucleated Red Blood Cells %            0.0 /100WBC


              Immature Granulocytes #              0.130 10^3/ul


              Neutrophils #                         14.3 10^3/ul


              Lymphocytes #                          1.1 10^3/ul


              Monocytes #                            1.6 10^3/ul


              Eosinophils #                          0.0 10^3/ul


              Basophils #                            0.1 10^3/ul


              Nucleated Red Blood Cells #            0.0 10^3/ul


              Sodium Level                            144 mmol/L


              Potassium Level                         3.7 mmol/L


              Chloride Level                          106 mmol/L


              Carbon Dioxide Level                     24 mmol/L


              Anion Gap                                       14


              Blood Urea Nitrogen                       24 mg/dl


              Creatinine                              2.90 mg/dl


              Est Glomerular Filtrat Rate
mL/min      23 mL/min 



              Glucose Level                            132 mg/dl


              Calcium Level                           10.0 mg/dl


              Total Bilirubin                          0.6 mg/dl


              Direct Bilirubin                        0.00 mg/dl


              Indirect Bilirubin                       0.6 mg/dl


              Aspartate Amino Transf
(AST/SGOT)         17 IU/L 



              Alanine Aminotransferase
(ALT/SGPT)       21 IU/L 



              Alkaline Phosphatase                       57 IU/L


              Total Protein                             6.9 g/dl


              Albumin                                   4.3 g/dl


              Globulin                                 2.60 g/dl


              Albumin/Globulin Ratio                        1.65


              Lipase                                      21 U/L





Current Medications


 Medications
   Dose
          Sig/Emily
       Start Time
   Status  Last


 (Trade)       Ordered        Route
 PRN     Stop Time              Admin
Dose


                              Reason                                Admin


 Sodium         1,000 ml @ 
   Q1H STAT
      4/1/19        DC            4/1/19


Chloride       1,000 mls/hr   IV
            21:32
 4/1/19                22:22



                                             22:31


 Morphine       4 mg           ONCE  STAT
    4/1/19        DC            4/1/19


Sulfate
                      IV
            21:32
 4/1/19                22:22



(morphine)                                   21:34


 Ondansetron    4 mg           ONCE  STAT
    4/1/19        DC            4/1/19


HCl
  (Zofran                 IV
            21:32
 4/1/19                22:21



Inj)                                         21:34


 Lorazepam
     1 mg           ONCE  ONCE
    4/2/19        DC            4/2/19


(Ativan)                      IV
            02:00
 4/2/19                01:55



                                             02:01


 Lorazepam
     2 mg           STK-MED        4/2/19        DC       



(Ativan)                      ONCE
 .ROUTE
  01:43
 4/2/19


                                             01:44








Procedures/MDM


Emergency department course: Patient seen about the charges.  Placed in bed from


evaluation.  Intravenous access.  Had blood work done.  A stat CT scan.  Given 


Zofran and Reglan for vomiting control.  Patient felt somewhat better but 


continued to have multiple episodes of vomiting here in the emergency 


department.





Diagnostic data:





EKG: 


Rate/Rhythm:             [Normal Sinus Rhythm]


QRS, ST, T-waves:    [No changes consistent w/ acute ischemia]


Impression:      [No evidence of ischemia or arrhythmia]





Chest X-ray 1V Interpreted by me:


Soft Tissue:                                               No acute 


abnormalities


Bones:                                                    No acute abnormalities


Mediastinum/Cardiac Silhouette/Lungs:     [No acute abnormalities]





Medical decision making:





Accepting Care Team:


Current data and ongoing care discussed at time of admission.


Primary:                    Dr. Claros


Consulting:               Deferred to inpatient team


Outstanding Data:   none





Departure


Diagnosis:  


   Primary Impression:  


   Nausea and vomiting


   Vomiting type:  unspecified  Vomiting Intractability:  intractable  Qualified


   Codes:  R11.2 - Nausea with vomiting, unspecified


Condition:  YAMILET Candelaria              Apr 2, 2019 02:20

## 2019-04-02 NOTE — HP
DATE OF ADMISSION: 04/02/2019

 

CHIEF COMPLAINT:  Nausea, vomiting, and abdominal pain.

 

HISTORY OF PRESENT ILLNESS:  This is a 53-year-old male well known to me with a past medical history 
of simultaneous renal and pancreatic transplant in 2000 with a baseline creatinine of 1.3 to 1.4 mg/d
L, a history of CMV virus, a history of cyclic vomiting syndrome, a recent history of ulcerative esop
hagitis, who presents to [facility] with complaint of 2 days of severe nausea and v
omiting.  The patient has had multiple admissions to [facility] with similar compla
ints most recently in 08/2018.  The patient previously was treated with antiemetics, IV hydration and
 discharged home.  The patient states that since last admission to an outside hospital, the patient n
oted that smoking marijuana has induced his vomiting and nausea.  The patient states that he stopped 
smoking marijuana and has had subsequent improvement of his nausea and vomiting.  The patient now adm
its to having engaged in cannabis use with resultant episodes of nausea and vomiting 2 days ago.  The
 patient describes having 8 to 10 bouts.  The patient as a result of worsening symptoms, came to the 
[facility].  Upon arrival, the patient had a CT scan of the abdomen and pelvis, whi
ch showed atherosclerosis, atrophic kidneys, nonobstructing renal calculi, left pelvic renal transpla
nt.  Nonspecific renal transplant hydronephrosis seen again, enlargement of the prostate seen again, 
large right hydrocele seen again, colonic diverticulosis, no specific evidence of acute diverticuliti
s.  No significant change from prior study.  The patient in the emergency room was given IV hydration
, antiemetic therapy and admitted to telemetry for evaluation.  Please also note on admission, the jennifer henderson has a white count of 17.2, a BUN of 24, creatinine 2.90.

 

Upon my evaluation of the patient at this time, he currently continues to have nausea, vomiting, leth
argy and weakness.

 

PAST MEDICAL HISTORY:  As stated above, history of end-stage renal disease, history of diabetes, hist
ory of CMV viremia, history of pneumonia, history of hypertension, history of ulcerative colitis.

 

PAST SURGICAL HISTORY:  Status post pancreatic kidney transplant in 2000.

 

ALLERGIES:  NO KNOWN DRUG ALLERGIES.

 

SOCIAL HISTORY:  He does not actively drink.

 

MEDICATIONS:  Medications have been reviewed and reconciled.

 

FAMILY HISTORY:  No family history of kidney disease.

 

REVIEW OF SYSTEMS:  A 14-point review of systems was conducted.  Pertinent positives stated in HPI, o
therwise negative.

 

PHYSICAL EXAMINATION:

VITAL SIGNS:  Blood pressure is 135/72, respirations 18, pulse 105, temperature 98.7.

HEENT:  Head is normocephalic.

NECK:  Supple.

HEART:  Regular rate.

LUNGS:  Show diminished breath sounds at the base.

ABDOMEN:  Soft, positive tenderness to palpation in the mid epigastric region.

EXTREMITIES:  Negative for clubbing, cyanosis, no edema.

DERMATOLOGIC:  No rashes.

MUSCULOSKELETAL:  No joint effusion.

NEUROLOGIC:  No focal deficits.

 

LABORATORY DATA:  From 04/02/2019 was reviewed.  CT scan of abdomen and pelvis was reviewed.

 

ASSESSMENT AND PLAN:

1.  Intractable nausea and vomiting.  Etiology may be multifactorial secondary to recent cannabis use
, ulcerative esophagitis.  The patient's CT scan of abdomen and pelvis was reviewed, no evidence of a
cute findings.  Plan is to continue aggressive IV hydration.  We will start the patient on Protonix I
V 40 mg b.i.d.  We will continue Compazine.  We will give Reglan as needed.  We will continue Zofran.
  We will place a GI consult with Dr. Melendez if no significant improvement in function.

2.  Nonoliguric acute kidney injury on top of chronic allograft failure with previous baseline creati
nine of 1.3 to 1.4 mg/dL.  Etiology of acute kidney injury is likely secondary to hemodynamics from v
olume depletion.  Low suspicion for acute rejection of Prograf toxicity.  Plan at this point, however
, is to check UA with microanalysis, check urine electrolytes.  Continue IV fluids and monitor closel
y.

3.  End-stage renal disease.  The patient is status post simultaneous kidney-pancreas transplant.  Th
e patient is currently in acute kidney injury as stated above.  Continue current immunosuppressive re
gimen.  Continue to monitor closely.

4.  Systemic inflammatory response syndrome, leukocytosis.  Etiology is likely secondary to stress an
d demargination.  The patient's white count is trending down.  No obvious signs of infection.  We gerhard
l check procalcitonin level, check a lactic acid level.  Continue IV hydration.  Defer antibiotic the
rapy at this time.  We will place an ID consult for evaluation.

5.  Tachycardia, likely secondary to pain, nausea, and vomiting.  We will continue IV hydration.  Con
tinue IV fluids, continue to treat underlying pain and nausea.  We will monitor closely.

6.  Anxiety disorder.  Continue Ativan.

7.  Hypertension.  We will monitor blood pressure closely.  Resume blood pressure medications as need
ed.

9.  Gastrointestinal and deep vein thrombosis prophylaxis.  Continue proton pump inhibitor and sequen
tial leg squeezers.

 

Please note I spent an additional 30 minutes of face-to-face time with the patient, discussing advanc
e directives and code status.  The patient is FULL CODE.

 

 

Dictated By: JACKIE BELL DO

 

NR/NTS

DD:    04/02/2019 08:09:38

DT:    04/02/2019 08:56:46

Conf#: [id number]

DID#:  [id number]

CC: JERMAIN SOSA MD;*EndCC*

## 2019-04-03 VITALS — HEART RATE: 99 BPM | DIASTOLIC BLOOD PRESSURE: 85 MMHG | RESPIRATION RATE: 18 BRPM | SYSTOLIC BLOOD PRESSURE: 141 MMHG

## 2019-04-03 VITALS — DIASTOLIC BLOOD PRESSURE: 84 MMHG | RESPIRATION RATE: 33 BRPM | HEART RATE: 102 BPM | SYSTOLIC BLOOD PRESSURE: 143 MMHG

## 2019-04-03 VITALS — HEART RATE: 104 BPM | RESPIRATION RATE: 19 BRPM | DIASTOLIC BLOOD PRESSURE: 82 MMHG | SYSTOLIC BLOOD PRESSURE: 138 MMHG

## 2019-04-03 VITALS — RESPIRATION RATE: 16 BRPM | HEART RATE: 95 BPM | DIASTOLIC BLOOD PRESSURE: 85 MMHG | SYSTOLIC BLOOD PRESSURE: 137 MMHG

## 2019-04-03 VITALS — HEART RATE: 98 BPM | DIASTOLIC BLOOD PRESSURE: 79 MMHG | SYSTOLIC BLOOD PRESSURE: 131 MMHG | RESPIRATION RATE: 16 BRPM

## 2019-04-03 VITALS — DIASTOLIC BLOOD PRESSURE: 85 MMHG | SYSTOLIC BLOOD PRESSURE: 130 MMHG | RESPIRATION RATE: 18 BRPM | HEART RATE: 90 BPM

## 2019-04-03 VITALS — SYSTOLIC BLOOD PRESSURE: 143 MMHG | RESPIRATION RATE: 20 BRPM | HEART RATE: 118 BPM | DIASTOLIC BLOOD PRESSURE: 91 MMHG

## 2019-04-03 VITALS — HEART RATE: 110 BPM | SYSTOLIC BLOOD PRESSURE: 100 MMHG | RESPIRATION RATE: 15 BRPM | DIASTOLIC BLOOD PRESSURE: 62 MMHG

## 2019-04-03 VITALS — HEART RATE: 115 BPM | RESPIRATION RATE: 17 BRPM | DIASTOLIC BLOOD PRESSURE: 90 MMHG | SYSTOLIC BLOOD PRESSURE: 142 MMHG

## 2019-04-03 VITALS — DIASTOLIC BLOOD PRESSURE: 88 MMHG | RESPIRATION RATE: 21 BRPM | SYSTOLIC BLOOD PRESSURE: 142 MMHG | HEART RATE: 110 BPM

## 2019-04-03 VITALS — RESPIRATION RATE: 18 BRPM | SYSTOLIC BLOOD PRESSURE: 133 MMHG | HEART RATE: 108 BPM | DIASTOLIC BLOOD PRESSURE: 87 MMHG

## 2019-04-03 VITALS — SYSTOLIC BLOOD PRESSURE: 132 MMHG | DIASTOLIC BLOOD PRESSURE: 70 MMHG | RESPIRATION RATE: 21 BRPM | HEART RATE: 98 BPM

## 2019-04-03 VITALS — RESPIRATION RATE: 17 BRPM | DIASTOLIC BLOOD PRESSURE: 90 MMHG | HEART RATE: 108 BPM | SYSTOLIC BLOOD PRESSURE: 138 MMHG

## 2019-04-03 VITALS — HEART RATE: 110 BPM | DIASTOLIC BLOOD PRESSURE: 77 MMHG | RESPIRATION RATE: 20 BRPM | SYSTOLIC BLOOD PRESSURE: 140 MMHG

## 2019-04-03 VITALS — RESPIRATION RATE: 19 BRPM | HEART RATE: 98 BPM | SYSTOLIC BLOOD PRESSURE: 147 MMHG | DIASTOLIC BLOOD PRESSURE: 78 MMHG

## 2019-04-03 VITALS — RESPIRATION RATE: 16 BRPM | HEART RATE: 96 BPM | DIASTOLIC BLOOD PRESSURE: 83 MMHG | SYSTOLIC BLOOD PRESSURE: 135 MMHG

## 2019-04-03 VITALS — HEART RATE: 102 BPM | SYSTOLIC BLOOD PRESSURE: 110 MMHG | RESPIRATION RATE: 20 BRPM | DIASTOLIC BLOOD PRESSURE: 81 MMHG

## 2019-04-03 VITALS — SYSTOLIC BLOOD PRESSURE: 150 MMHG | DIASTOLIC BLOOD PRESSURE: 68 MMHG | HEART RATE: 106 BPM | RESPIRATION RATE: 23 BRPM

## 2019-04-03 VITALS — SYSTOLIC BLOOD PRESSURE: 120 MMHG | DIASTOLIC BLOOD PRESSURE: 83 MMHG | HEART RATE: 111 BPM | RESPIRATION RATE: 16 BRPM

## 2019-04-03 VITALS — HEART RATE: 100 BPM | DIASTOLIC BLOOD PRESSURE: 78 MMHG | RESPIRATION RATE: 18 BRPM | SYSTOLIC BLOOD PRESSURE: 138 MMHG

## 2019-04-03 LAB
ADD MAN DIFF?: NO
ADD UMIC: YES
ANION GAP: 13 (ref 5–13)
BASOPHIL #: 0 10^3/UL (ref 0–0.1)
BASOPHILS %: 0.3 % (ref 0–2)
BLOOD UREA NITROGEN: 21 MG/DL (ref 7–20)
CALCIUM: 9.3 MG/DL (ref 8.4–10.2)
CARBON DIOXIDE: 24 MMOL/L (ref 21–31)
CHLORIDE: 107 MMOL/L (ref 97–110)
CREATININE,URINE RANDOM: 17.06 MG/DL (ref 20–370)
CREATININE: 1.68 MG/DL (ref 0.61–1.24)
EOSINOPHILS #: 0 10^3/UL (ref 0–0.5)
EOSINOPHILS %: 0.1 % (ref 0–7)
GLUCOSE: 98 MG/DL (ref 70–220)
HEMATOCRIT: 41.8 % (ref 42–52)
HEMOGLOBIN: 13.7 G/DL (ref 14–18)
IMMATURE GRANS #M: 0.09 10^3/UL (ref 0–0.03)
IMMATURE GRANS % (M): 0.7 % (ref 0–0.43)
LYMPHOCYTES #: 1.7 10^3/UL (ref 0.8–2.9)
LYMPHOCYTES %: 13.2 % (ref 15–51)
MAGNESIUM: 1.3 MG/DL (ref 1.7–2.5)
MEAN CORPUSCULAR HEMOGLOBIN: 30.7 PG (ref 29–33)
MEAN CORPUSCULAR HGB CONC: 32.8 G/DL (ref 32–37)
MEAN CORPUSCULAR VOLUME: 93.7 FL (ref 82–101)
MEAN PLATELET VOLUME: 10.9 FL (ref 7.4–10.4)
MONOCYTE #: 1 10^3/UL (ref 0.3–0.9)
MONOCYTES %: 8 % (ref 0–11)
NEUTROPHIL #: 10.1 10^3/UL (ref 1.6–7.5)
NEUTROPHILS %: 77.7 % (ref 39–77)
NUCLEATED RED BLOOD CELLS #: 0 10^3/UL (ref 0–0)
NUCLEATED RED BLOOD CELLS%: 0 /100WBC (ref 0–0)
PHOSPHORUS: 2.8 MG/DL (ref 2.5–4.9)
PLATELET COUNT: 181 10^3/UL (ref 140–415)
POTASSIUM: 3.6 MMOL/L (ref 3.5–5.1)
PROTEIN URINE: 23 MG/DL (ref 0–11.9)
RED BLOOD COUNT: 4.46 10^6/UL (ref 4.7–6.1)
RED CELL DISTRIBUTION WIDTH: 14.3 % (ref 11.5–14.5)
SODIUM,URINE RANDOM: 109 MMOL/L (ref 30–90)
SODIUM: 144 MMOL/L (ref 135–144)
UR ASCORBIC ACID: NEGATIVE MG/DL
UR BILIRUBIN (DIP): NEGATIVE MG/DL
UR BLOOD (DIP): (no result) MG/DL
UR CLARITY: CLEAR
UR COLOR: COLORLESS
UR GLUCOSE (DIP): NEGATIVE MG/DL
UR KETONES (DIP): (no result) MG/DL
UR LEUKOCYTE ESTERASE (DIP): NEGATIVE LEU/UL
UR NITRITE (DIP): NEGATIVE MG/DL
UR PH (DIP): 7 (ref 5–9)
UR RBC: 0 /HPF (ref 0–5)
UR SPECIFIC GRAVITY (DIP): 1 (ref 1–1.03)
UR TOTAL PROTEIN (DIP): NEGATIVE MG/DL
UR UROBILINOGEN (DIP): NEGATIVE MG/DL
UR WBC: 0 /HPF (ref 0–5)
WHITE BLOOD COUNT: 13 10^3/UL (ref 4.8–10.8)

## 2019-04-03 PROCEDURE — 0DB68ZX EXCISION OF STOMACH, VIA NATURAL OR ARTIFICIAL OPENING ENDOSCOPIC, DIAGNOSTIC: ICD-10-PCS

## 2019-04-03 PROCEDURE — 0DB68ZX EXCISION OF STOMACH, VIA NATURAL OR ARTIFICIAL OPENING ENDOSCOPIC, DIAGNOSTIC: ICD-10-PCS | Performed by: INTERNAL MEDICINE

## 2019-04-03 RX ADMIN — PANTOPRAZOLE SODIUM SCH MG: 40 INJECTION, POWDER, FOR SOLUTION INTRAVENOUS at 18:53

## 2019-04-03 RX ADMIN — PANTOPRAZOLE SODIUM SCH MG: 40 INJECTION, POWDER, FOR SOLUTION INTRAVENOUS at 04:27

## 2019-04-03 RX ADMIN — ONDANSETRON HYDROCHLORIDE 1 MG: 2 INJECTION, SOLUTION INTRAMUSCULAR; INTRAVENOUS at 01:38

## 2019-04-03 RX ADMIN — MORPHINE SULFATE PRN MG: 2 INJECTION, SOLUTION INTRAMUSCULAR; INTRAVENOUS at 12:33

## 2019-04-03 RX ADMIN — MAGNESIUM SULFATE HEPTAHYDRATE 1 MLS/HR: 40 INJECTION, SOLUTION INTRAVENOUS at 12:33

## 2019-04-03 RX ADMIN — ALPRAZOLAM 1 MG: 1 TABLET ORAL at 01:29

## 2019-04-03 RX ADMIN — FOLIC ACID SCH MLS/HR: 5 INJECTION, SOLUTION INTRAMUSCULAR; INTRAVENOUS; SUBCUTANEOUS at 21:17

## 2019-04-03 RX ADMIN — PROCHLORPERAZINE EDISYLATE PRN MG: 5 INJECTION INTRAMUSCULAR; INTRAVENOUS at 04:15

## 2019-04-03 RX ADMIN — AMLODIPINE BESYLATE SCH MG: 10 TABLET ORAL at 09:39

## 2019-04-03 RX ADMIN — MYCOPHENOLIC ACID SCH MG: 180 TABLET, DELAYED RELEASE ORAL at 21:16

## 2019-04-03 RX ADMIN — TACROLIMUS 1 MG: 1 CAPSULE ORAL at 09:38

## 2019-04-03 RX ADMIN — PANTOPRAZOLE SODIUM 1 MG: 40 INJECTION, POWDER, FOR SOLUTION INTRAVENOUS at 18:53

## 2019-04-03 RX ADMIN — TACROLIMUS SCH MG: 1 CAPSULE ORAL at 21:16

## 2019-04-03 RX ADMIN — THIAMINE HYDROCHLORIDE 1 MLS/HR: 100 INJECTION, SOLUTION INTRAMUSCULAR; INTRAVENOUS at 00:29

## 2019-04-03 RX ADMIN — MORPHINE SULFATE 1 MG: 2 INJECTION, SOLUTION INTRAMUSCULAR; INTRAVENOUS at 12:33

## 2019-04-03 RX ADMIN — SUCRALFATE SCH GM: 1 SUSPENSION ORAL at 18:53

## 2019-04-03 RX ADMIN — SUCRALFATE SCH GM: 1 SUSPENSION ORAL at 21:16

## 2019-04-03 RX ADMIN — MYCOPHENOLIC ACID 1 MG: 180 TABLET, DELAYED RELEASE ORAL at 09:38

## 2019-04-03 RX ADMIN — TACROLIMUS 1 MG: 1 CAPSULE ORAL at 21:16

## 2019-04-03 RX ADMIN — SUCRALFATE SCH GM: 1 SUSPENSION ORAL at 13:00

## 2019-04-03 RX ADMIN — MYCOPHENOLIC ACID SCH MG: 180 TABLET, DELAYED RELEASE ORAL at 09:38

## 2019-04-03 RX ADMIN — SUCRALFATE 1 GM: 1 SUSPENSION ORAL at 18:53

## 2019-04-03 RX ADMIN — PROCHLORPERAZINE EDISYLATE 1 MG: 5 INJECTION INTRAMUSCULAR; INTRAVENOUS at 04:15

## 2019-04-03 RX ADMIN — AMLODIPINE BESYLATE 1 MG: 10 TABLET ORAL at 09:39

## 2019-04-03 RX ADMIN — METOCLOPRAMIDE HYDROCHLORIDE 1 MG: 10 INJECTION, SOLUTION INTRAMUSCULAR; INTRAVENOUS at 18:11

## 2019-04-03 RX ADMIN — SUCRALFATE 1 GM: 1 SUSPENSION ORAL at 13:00

## 2019-04-03 RX ADMIN — METOCLOPRAMIDE HYDROCHLORIDE SCH MG: 10 INJECTION, SOLUTION INTRAMUSCULAR; INTRAVENOUS at 18:11

## 2019-04-03 RX ADMIN — ALPRAZOLAM PRN MG: 1 TABLET ORAL at 01:29

## 2019-04-03 RX ADMIN — MYCOPHENOLIC ACID 1 MG: 180 TABLET, DELAYED RELEASE ORAL at 21:16

## 2019-04-03 RX ADMIN — MORPHINE SULFATE PRN MG: 2 INJECTION, SOLUTION INTRAMUSCULAR; INTRAVENOUS at 04:25

## 2019-04-03 RX ADMIN — PANTOPRAZOLE SODIUM 1 MG: 40 INJECTION, POWDER, FOR SOLUTION INTRAVENOUS at 04:27

## 2019-04-03 RX ADMIN — DEXAMETHASONE SODIUM PHOSPHATE PRN MG: 10 INJECTION, SOLUTION INTRAMUSCULAR; INTRAVENOUS at 09:39

## 2019-04-03 RX ADMIN — SUCRALFATE 1 GM: 1 SUSPENSION ORAL at 21:16

## 2019-04-03 RX ADMIN — THIAMINE HYDROCHLORIDE 1 MLS/HR: 100 INJECTION, SOLUTION INTRAMUSCULAR; INTRAVENOUS at 12:33

## 2019-04-03 RX ADMIN — DEXAMETHASONE SODIUM PHOSPHATE PRN MG: 10 INJECTION, SOLUTION INTRAMUSCULAR; INTRAVENOUS at 01:38

## 2019-04-03 RX ADMIN — PROPOFOL 1: 10 INJECTION, EMULSION INTRAVENOUS at 18:57

## 2019-04-03 RX ADMIN — LIDOCAINE HYDROCHLORIDE 1 MG: 20 INJECTION, SOLUTION INTRAVENOUS at 18:57

## 2019-04-03 RX ADMIN — TACROLIMUS SCH MG: 1 CAPSULE ORAL at 09:38

## 2019-04-03 RX ADMIN — FOLIC ACID SCH MLS/HR: 5 INJECTION, SOLUTION INTRAMUSCULAR; INTRAVENOUS; SUBCUTANEOUS at 00:29

## 2019-04-03 RX ADMIN — ONDANSETRON HYDROCHLORIDE 1 MG: 2 INJECTION, SOLUTION INTRAMUSCULAR; INTRAVENOUS at 09:39

## 2019-04-03 RX ADMIN — MORPHINE SULFATE 1 MG: 2 INJECTION, SOLUTION INTRAMUSCULAR; INTRAVENOUS at 04:25

## 2019-04-03 RX ADMIN — THIAMINE HYDROCHLORIDE 1 MLS/HR: 100 INJECTION, SOLUTION INTRAMUSCULAR; INTRAVENOUS at 21:17

## 2019-04-03 RX ADMIN — FOLIC ACID SCH MLS/HR: 5 INJECTION, SOLUTION INTRAMUSCULAR; INTRAVENOUS; SUBCUTANEOUS at 12:33

## 2019-04-03 NOTE — CONS
Assessment/Plan


Assessment/Plan


Hospital Course (Demo Recall)


Summary Assessment and Plan:


Assessment:


Intractable nausea/vomiting-cannabinoid hyperemesis syndrome versus 


gastroparesis versus other


S/p EGD 2/23/18- Impression: Severe ulcerated esophagitis.Small hiatal hernia. 


Moderate gastritis. 


Gastric body and antrum biopsy: Negative for h.pylori or malignancy  


Esophagus biopsy: Acute esophagitis.  For virus inclusions/parasites negative 


for malignancy.


SIRS with leukocytosis 


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


   -Chronic prednisone use


HTN


Anxiety


History of Marijuana use- quit for 2-3 months and recently started smoking again


   -Discussed abstinence of marijuana





Plan:


Agree with continue PPI twice daily


Add Carafate 1 g / 10 mL p.o. 4 times daily


EGD today


Endoscopy - risks/benefits/alternatives/indications of procedure and 


sedation/anesthesia discussed with patient who states understanding and gives 


informed consent to proceed. 


Patient seen in collaboration with Dr. Melendez


CC:   ANAHI MELENDEZ MD ;





Consultation Date/Type/Reason


Admit Date/Time


Apr 2, 2019 at 01:28


Date of Consultation:  Apr 3, 2019


Type of Consult


GI


Reason for Consultation


Intractable nausea/vomiting


Date/Time of Note


DATE: 4/3/19 


TIME: 12:35





Hx of Present Illness


This is a 53-year-old male with a history of pancreas and kidney transplant, 


HTN, GERD and anxiety who was admitted for intractable nausea and non-bloody 


vomiting.  Patient states he feels nausea/vomiting secondary to pain avoid 


hyperemesis syndrome.  Patient states he stopped smoking marijuana about 2 


months ago with sublingual nauseous and started smoking again yesterday when his


symptoms became progressively worse.  He notes improvement with a hot shower.  


Patient has not been hospitalized for some similar symptoms previously underwent


an upper endoscopy 2/23/19 showing severe ulcerative esophagitis, moderate 


gastritis and a small hiatal hernia biopsies were all negative.  The patient 


complains of some epigastric/sternal pain improved with pain medication denies 


further episodes of nausea/vomiting, diarrhea, constipation, or overt signs of 


GI bleed e.g. Melena, hematochezia, or hematemesis. Lab workup shows 


leukocytosis likely secondary to systemic inflammatory syndrome normocytic 


anemia mild and renal insufficiency slowly improving.  Per medical orders diet 


is currently n.p.o. however patient is noncompliant has been drinking Gatorade 


and water brought in by his girlfriend patient states he is tolerating liquids 


well and no complaints of nausea vomiting currently.


Review of Systems: 


[A 12 system, review was conducted and is negative except as noted in the HPI or


 here.]





Past Medical History


Home Meds


Active Scripts


Pantoprazole* (Protonix*) 40 Mg Tablet.dr, 40 MG PO BID, #60 TAB


   Prov:SOFIA CARNES MD         2/24/18


Reported Medications


Metoprolol Tartrate* (Lopressor*) 25 Mg Tab, 25 MG PO DAILY, #60 TAB


   4/2/19


Mycophenolate Sodium* (Mycophenolic Acid*) 360 Mg Tablet.dr, 360 MG PO BID, TAB


   2/21/18


Tacrolimus* (Tacrolimus*) 1 Mg Capsule, 2 MG PO BID, CAP


   2/21/18


Amlodipine Besylate* (Amlodipine Besylate*) 10 Mg Tablet, 10 MG PO DAILY, #30 


TAB


   2/21/18


Prednisone* (Prednisone*) 5 Mg Tab, 5 MG PO DAILY, TAB


   2/21/18


Alprazolam* (Alprazolam*) 1 Mg Tablet, 1 MG PO TID PRN for ANXIETY, TAB


   2/21/18


Medications





Current Medications


Sodium Chloride 1,000 ml @  100 mls/hr Q10H IV  Last administered on 4/3/19at 


12:33; Admin Dose 100 MLS/HR;  Start 4/2/19 at 04:00


Ondansetron HCl (Zofran Inj) 4 mg Q6H  PRN IV NAUSEA AND/OR VOMITING Last 


administered on 4/3/19at 09:39; Admin Dose 4 MG;  Start 4/2/19 at 04:00


Morphine Sulfate (morphine) 2 mg Q4H  PRN IV SEVERE PAIN LEVEL 7-10 Last 


administered on 4/3/19at 12:33; Admin Dose 2 MG;  Start 4/2/19 at 04:00


Alprazolam (Xanax) 1 mg TID  PRN PO ANXIETY Last administered on 4/3/19at 01:29;


Admin Dose 1 MG;  Start 4/2/19 at 04:00


Amlodipine Besylate (Norvasc) 10 mg DAILY PO  Last administered on 4/3/19at 0


9:39; Admin Dose 10 MG;  Start 4/2/19 at 09:00


Mycophenolate Sodium (Myfortic) 360 mg BID PO  Last administered on 4/3/19at 


09:38; Admin Dose 360 MG;  Start 4/2/19 at 09:00


Pantoprazole (Protonix Tab) 40 mg BID@0600,1800 PO ;  Start 4/2/19 at 06:00;  


Status Hold


Prednisone (Prednisone) 5 mg DAILY PO  Last administered on 4/3/19at 09:38; 


Admin Dose 5 MG;  Start 4/2/19 at 09:00


Tacrolimus (Prograf) 2 mg BID PO  Last administered on 4/3/19at 09:38; Admin 


Dose 2 MG;  Start 4/2/19 at 09:00


Pantoprazole (Protonix Iv) 40 mg BID@06,18 IV  Last administered on 4/3/19at 


04:27; Admin Dose 40 MG;  Start 4/2/19 at 18:00


Prochlorperazine (Compazine Inj) 5 mg Q4H  PRN IV NAUSEA AND/OR VOMITING Last 


administered on 4/3/19at 04:15; Admin Dose 5 MG;  Start 4/2/19 at 07:30


Allergies:  


Coded Allergies:  


     No Known Allergies (Verified  Allergy, Unknown, 8/1/18)





Social History


Smoking Status:  Never smoker





Exam/Review of Systems


Exam


Vitals





Vital Signs


  Date      Temp  Pulse  Resp  B/P (MAP)   Pulse Ox  O2          O2 Flow    FiO2


Time                                                 Delivery    Rate


    4/3/19  98.4    118    20      143/91        96  Room Air


     08:00                          (108)








Intake and Output





4/2/19


4/2/19


4/3/19





1515:00


23:00


07:00





IntakeIntake Total


910 ml


1070 ml





OutputOutput Total


650 ml


1700 ml





BalanceBalance


260 ml


-630 ml











Exam


PHYSICAL EXAMINATION:


GENERAL: Well developed, well nourished, alert & oriented x 3, in no acute 


distress


SKIN: No lesions


HEAD: Normocephalic, atraumatic, no tenderness.


EYES: Pupils equal reactive to light, no discharge.


EARS/NOSE AND THROAT: Ears normal, nose normal, oropharynx normal


NECK: Supple, no masses


CHEST: Inspection within normal limits.


CARDIOVASCULAR: Heart: Regular rate and rhythm


RESPIRATORY: Lungs clear to auscultation


GASTROINTESTINAL AND LIVER: Abdomen: Soft, non tenderness, non-distended, no 


hernias, no masses, no organomegaly, no ascites, no guarding, no rebound 


tenderness, normoactive bowel sounds. Rectal: Deferred. 


EXTREMITIES: No cyanosis, clubbing or edema.





Results


Result Diagram:  


4/3/19 0448                                                                     


          4/3/19 0448





Results 24hrs





Laboratory Tests


 Test
                                4/2/19
14:13  4/3/19
02:00  4/3/19
04:48


 Sodium Level                                144                         144


 Potassium Level                             4.1                         3.6


 Chloride Level                              109                         107


 Carbon Dioxide Level                         24                          24


 Anion Gap                                    11                          13


 Blood Urea Nitrogen                         25  H                       21  H


 Creatinine                                2.22  H                     1.68  H


 Est Glomerular Filtrat Rate
mL/min         31  L
  
                   43  L



 Glucose Level                               108                          98


 Calcium Level                               9.6                         9.3


 Urine Color                                        COLORLESS


 Urine Clarity                                      CLEAR


 Urine pH                                                  7.0


 Urine Specific Gravity                                  1.005


 Urine Ketones                                      TRACE  A


 Urine Nitrite                                      NEGATIVE


 Urine Bilirubin                                    NEGATIVE


 Urine Urobilinogen                                 NEGATIVE


 Urine Leukocyte Esterase                           NEGATIVE


 Urine Microscopic RBC                                       0


 Urine Microscopic WBC                                       0


 Urine Hemoglobin                                          1+  H


 Urine Random Creatinine                                17.06  L


 Urine Random Sodium                                      109  H


 Urine Glucose                                      NEGATIVE


 Urine Total Protein                                     23.0  H


 White Blood Count                                                     13.0  H


 Red Blood Count                                                       4.46  L


 Hemoglobin                                                            13.7  L


 Hematocrit                                                            41.8  L


 Mean Corpuscular Volume                                                93.7


 Mean Corpuscular Hemoglobin                                            30.7


 Mean Corpuscular Hemoglobin
Concent  
             
                  32.8  



 Red Cell Distribution Width                                            14.3


 Platelet Count                                                          181


 Mean Platelet Volume                                                  10.9  H


 Immature Granulocytes %                                              0.700  H


 Neutrophils %                                                         77.7  H


 Lymphocytes %                                                         13.2  L


 Monocytes %                                                             8.0


 Eosinophils %                                                           0.1


 Basophils %                                                             0.3


 Nucleated Red Blood Cells %                                             0.0


 Immature Granulocytes #                                              0.090  H


 Neutrophils #                                                         10.1  H


 Lymphocytes #                                                           1.7


 Monocytes #                                                            1.0  H


 Eosinophils #                                                           0.0


 Basophils #                                                             0.0


 Nucleated Red Blood Cells #                                             0.0


 Phosphorus Level                                                        2.8


 Magnesium Level                                                        1.3  L








Medications


Medication





Current Medications


Sodium Chloride 1,000 ml @  100 mls/hr Q10H IV  Last administered on 4/3/19at 


12:33; Admin Dose 100 MLS/HR;  Start 4/2/19 at 04:00


Ondansetron HCl (Zofran Inj) 4 mg Q6H  PRN IV NAUSEA AND/OR VOMITING Last 


administered on 4/3/19at 09:39; Admin Dose 4 MG;  Start 4/2/19 at 04:00


Morphine Sulfate (morphine) 2 mg Q4H  PRN IV SEVERE PAIN LEVEL 7-10 Last 


administered on 4/3/19at 12:33; Admin Dose 2 MG;  Start 4/2/19 at 04:00


Alprazolam (Xanax) 1 mg TID  PRN PO ANXIETY Last administered on 4/3/19at 01:29;


Admin Dose 1 MG;  Start 4/2/19 at 04:00


Amlodipine Besylate (Norvasc) 10 mg DAILY PO  Last administered on 4/3/19at 


09:39; Admin Dose 10 MG;  Start 4/2/19 at 09:00


Mycophenolate Sodium (Myfortic) 360 mg BID PO  Last administered on 4/3/19at 


09:38; Admin Dose 360 MG;  Start 4/2/19 at 09:00


Pantoprazole (Protonix Tab) 40 mg BID@0600,1800 PO ;  Start 4/2/19 at 06:00;  


Status Hold


Prednisone (Prednisone) 5 mg DAILY PO  Last administered on 4/3/19at 09:38; 


Admin Dose 5 MG;  Start 4/2/19 at 09:00


Tacrolimus (Prograf) 2 mg BID PO  Last administered on 4/3/19at 09:38; Admin 


Dose 2 MG;  Start 4/2/19 at 09:00


Pantoprazole (Protonix Iv) 40 mg BID@06,18 IV  Last administered on 4/3/19at 


04:27; Admin Dose 40 MG;  Start 4/2/19 at 18:00


Prochlorperazine (Compazine Inj) 5 mg Q4H  PRN IV NAUSEA AND/OR VOMITING Last 


administered on 4/3/19at 04:15; Admin Dose 5 MG;  Start 4/2/19 at 07:30











SENAIT BOYD              Apr 3, 2019 12:45

## 2019-04-03 NOTE — PREAC
Date/Time of Note


Date/Time of Note


DATE: 4/3/19 


TIME: 16:44





Anesthesia Eval and Record


Evaluation


Time Pre-Procedure Interview


DATE: 4/3/19 


TIME: 16:44


Age


53


Sex


male


NPO:  8 hrs


Preoperative diagnosis


nausea, vomiting


Planned procedure


EGD





Past Medical History


Past Medical History:  Includes


Cardio:  HTN


Endo:  Diabetes


Renal:  SPENCER, CKD, ESRD on dialysis (s/p renal pancreatic transplant )


GI:  Other (ulcerative esophagitis)


Psych:  Anxiety


Recreational drugs:  Marijuana





Surgery & Anesthesia Issues


No known issue





Meds


Anticoagulation:  No


Beta Blocker within 24 hr:  No


Reason Beta Blocker not given:  Bradycarida, Hypotension


Active Scripts


Pantoprazole* (Protonix*) 40 Mg Tablet., 40 MG PO BID, #60 TAB


   Prov:SOFIA CARNES MD         2/24/18


Reported Medications


Metoprolol Tartrate* (Lopressor*) 25 Mg Tab, 25 MG PO DAILY, #60 TAB


   4/2/19


Mycophenolate Sodium* (Mycophenolic Acid*) 360 Mg Tablet., 360 MG PO BID, TAB


   2/21/18


Tacrolimus* (Tacrolimus*) 1 Mg Capsule, 2 MG PO BID, CAP


   2/21/18


Amlodipine Besylate* (Amlodipine Besylate*) 10 Mg Tablet, 10 MG PO DAILY, #30 


TAB


   2/21/18


Prednisone* (Prednisone*) 5 Mg Tab, 5 MG PO DAILY, TAB


   2/21/18


Alprazolam* (Alprazolam*) 1 Mg Tablet, 1 MG PO TID PRN for ANXIETY, TAB


   2/21/18





Current Medications


Sodium Chloride 1,000 ml @  100 mls/hr Q10H IV  Last administered on 4/3/19at 


12:33; Admin Dose 100 MLS/HR;  Start 4/2/19 at 04:00


Ondansetron HCl (Zofran Inj) 4 mg Q6H  PRN IV NAUSEA AND/OR VOMITING Last 


administered on 4/3/19at 09:39; Admin Dose 4 MG;  Start 4/2/19 at 04:00


Morphine Sulfate (morphine) 2 mg Q4H  PRN IV SEVERE PAIN LEVEL 7-10 Last 


administered on 4/3/19at 12:33; Admin Dose 2 MG;  Start 4/2/19 at 04:00


Alprazolam (Xanax) 1 mg TID  PRN PO ANXIETY Last administered on 4/3/19at 01:29;


Admin Dose 1 MG;  Start 4/2/19 at 04:00


Amlodipine Besylate (Norvasc) 10 mg DAILY PO  Last administered on 4/3/19at 0


9:39; Admin Dose 10 MG;  Start 4/2/19 at 09:00


Mycophenolate Sodium (Myfortic) 360 mg BID PO  Last administered on 4/3/19at 


09:38; Admin Dose 360 MG;  Start 4/2/19 at 09:00


Pantoprazole (Protonix Tab) 40 mg BID@0600,1800 PO ;  Start 4/2/19 at 06:00;  


Status Hold


Prednisone (Prednisone) 5 mg DAILY PO  Last administered on 4/3/19at 09:38; 


Admin Dose 5 MG;  Start 4/2/19 at 09:00


Tacrolimus (Prograf) 2 mg BID PO  Last administered on 4/3/19at 09:38; Admin 


Dose 2 MG;  Start 4/2/19 at 09:00


Pantoprazole (Protonix Iv) 40 mg BID@06,18 IV  Last administered on 4/3/19at 


04:27; Admin Dose 40 MG;  Start 4/2/19 at 18:00


Prochlorperazine (Compazine Inj) 5 mg Q4H  PRN IV NAUSEA AND/OR VOMITING Last 


administered on 4/3/19at 04:15; Admin Dose 5 MG;  Start 4/2/19 at 07:30


Sucralfate (Carafate Susp) 1 gm QID PO ;  Start 4/3/19 at 13:00


Meds reviewed:  Yes





Allergies


Coded Allergies:  


     No Known Allergies (Verified  Allergy, Unknown, 8/1/18)


Allergies Reviewed:  Yes





Labs/Studies


Labs Reviewed:  Reviewed by anesthesiologist


Result Diagram:  


4/3/19 0448                                                                     


          4/3/19 0448





Laboratory Tests


4/3/19 04:48








Pregnancy test:  N/A





Pre-procedure Exam


Last vitals





Vital Signs


  Date      Temp  Pulse  Resp  B/P (MAP)   Pulse Ox  O2          O2 Flow    FiO2


Time                                                 Delivery    Rate


    4/3/19  98.4    110    20      140/77       100  Room Air


     16:16                           (98)





Airway:  Adequate mouth opening, Adequate thyromental dist


Mallampati:  Mallampati II


Teeth:  Normal


Lung:  Normal


Heart:  Normal





ASA Physical Status


ASA physical status:  3


Emergency:  None





Planned Anesthetic


General/MAC:  Mask





Planned Pain Management


Parenteral pain med





Pre-operative Attestations


Prior to commencing anesthesia and surgery, the patient was re-evaluated, there 


was verification of:


*The patient's identity


*The results of appropriate recent lab work and preoperative vital signs


*The above evaluation not changing prior to induction


*Anesthetic plan, risk benefits, alternative and complications discussed with 


patient/family; questions answered; patient/family understands, accepts and 


wishes to proceed.











HARI HIDALGO MD                 Apr 3, 2019 16:44

## 2019-04-03 NOTE — PAC
Date/Time of Note


Date/Time of Note


DATE: 4/3/19 


TIME: 17:57





Post-Anesthesia Notes


Post-Anesthesia Note


Last documented vital signs





Vital Signs


  Date      Temp  Pulse  Resp  B/P (MAP)   Pulse Ox  O2          O2 Flow    FiO2


Time                                                 Delivery    Rate


    4/3/19  98.4    110    20      140/77       100  Room Air


     16:16                           (98)





Activity:  WNL


Respiratory function:  WNL


Cardiovascular function:  WNL


Mental status:  Baseline


Pain reasonably controlled:  Yes


Hydration appropriate:  Yes


Nausea/Vomiting absent:  Yes


Comments


BP: 133/87


HR: 99


RR: 15


T: 98.1


SaO2: 100%











HARI HIDALGO MD                 Apr 3, 2019 17:57

## 2019-04-03 NOTE — CONS
Assessment/Plan


Assessment/Plan


Hospital Course (Demo Recall)


Patient is alert looks comfortable complaining of reflux no fevers overnight WBC


13 platelets 181 neutrophils 77.7 BUN 21 creatinine 1.68





Urinalysis was negative for nitrite leukocyte Estrace





CT abdomen and pelvis revealed colonic diverticulosis no acute diverticulitis 


left pelvic renal transplant multiple bilateral nonobstructive native renal 


calculi mild nonspecific renal transplant hydronephrosis.  Mild enlargement of 


the prostate.  Large right hydrocephaly.  Please see full report in the chart





Physical examination: This is a wasted well-developed ill-appearing middle-aged 


man who is alert in no distress.  Head atraumatic normocephalic.  Neck is 


supple.  Chest rise symmetrical breath sounds clear.  Heart: S1-S2.  Abdomen 


soft bowel sounds present.  Extremities without cyanosis.





Assessment:


1.  Systemic inflammatory response syndrome


2.  Intractable nausea status post vomiting


3.  Acute on chronic kidney disease


4.  History of kidney/pancreatic transplant, on immunosuppressive therapy


5.  Hypertension


6.  Anxiety





Plan: Patient is stable, no evidence of active infection, he is off antibiotics,


gastroenterology on case, plan for EGD





Consultation Date/Type/Reason


Admit Date/Time


Apr 2, 2019 at 01:28


Initial Consult Date


4/3/19


Type of Consult


id


Date/Time of Note


DATE: 4/3/19 


TIME: 14:32





Exam/Review of Systems


Exam


Vitals





Vital Signs


  Date      Temp  Pulse  Resp  B/P (MAP)   Pulse Ox  O2          O2 Flow    FiO2


Time                                                 Delivery    Rate


    4/3/19  98.4    118    20      143/91        96  Room Air


     08:00                          (108)








Intake and Output





4/2/19


4/2/19


4/3/19





1515:00


23:00


07:00





IntakeIntake Total


910 ml


1070 ml





OutputOutput Total


650 ml


1700 ml





BalanceBalance


260 ml


-630 ml














Results


Result Diagram:  


4/3/19 0448                                                                     


          4/3/19 0448





Results 24hrs





Laboratory Tests


        Test
                                4/3/19
02:00  4/3/19
04:48


        Urine Color                          COLORLESS


        Urine Clarity                        CLEAR


        Urine pH                                    7.0


        Urine Specific Gravity                    1.005


        Urine Ketones                        TRACE  A


        Urine Nitrite                        NEGATIVE


        Urine Bilirubin                      NEGATIVE


        Urine Urobilinogen                   NEGATIVE


        Urine Leukocyte Esterase             NEGATIVE


        Urine Microscopic RBC                         0


        Urine Microscopic WBC                         0


        Urine Hemoglobin                            1+  H


        Urine Random Creatinine                  17.06  L


        Urine Random Sodium                        109  H


        Urine Glucose                        NEGATIVE


        Urine Total Protein                       23.0  H


        White Blood Count                                       13.0  H


        Red Blood Count                                         4.46  L


        Hemoglobin                                              13.7  L


        Hematocrit                                              41.8  L


        Mean Corpuscular Volume                                  93.7


        Mean Corpuscular Hemoglobin                              30.7


        Mean Corpuscular Hemoglobin
Concent  
                  32.8  



        Red Cell Distribution Width                              14.3


        Platelet Count                                            181


        Mean Platelet Volume                                    10.9  H


        Immature Granulocytes %                                0.700  H


        Neutrophils %                                           77.7  H


        Lymphocytes %                                           13.2  L


        Monocytes %                                               8.0


        Eosinophils %                                             0.1


        Basophils %                                               0.3


        Nucleated Red Blood Cells %                               0.0


        Immature Granulocytes #                                0.090  H


        Neutrophils #                                           10.1  H


        Lymphocytes #                                             1.7


        Monocytes #                                              1.0  H


        Eosinophils #                                             0.0


        Basophils #                                               0.0


        Nucleated Red Blood Cells #                               0.0


        Sodium Level                                              144


        Potassium Level                                           3.6


        Chloride Level                                            107


        Carbon Dioxide Level                                       24


        Anion Gap                                                  13


        Blood Urea Nitrogen                                       21  H


        Creatinine                                              1.68  H


        Est Glomerular Filtrat Rate
mL/min   
                   43  L



        Glucose Level                                              98


        Calcium Level                                             9.3


        Phosphorus Level                                          2.8


        Magnesium Level                                          1.3  L








Medications


Medication





Current Medications


Sodium Chloride 1,000 ml @  100 mls/hr Q10H IV  Last administered on 4/3/19at 


12:33; Admin Dose 100 MLS/HR;  Start 4/2/19 at 04:00


Ondansetron HCl (Zofran Inj) 4 mg Q6H  PRN IV NAUSEA AND/OR VOMITING Last 


administered on 4/3/19at 09:39; Admin Dose 4 MG;  Start 4/2/19 at 04:00


Morphine Sulfate (morphine) 2 mg Q4H  PRN IV SEVERE PAIN LEVEL 7-10 Last 


administered on 4/3/19at 12:33; Admin Dose 2 MG;  Start 4/2/19 at 04:00


Alprazolam (Xanax) 1 mg TID  PRN PO ANXIETY Last administered on 4/3/19at 01:29;


Admin Dose 1 MG;  Start 4/2/19 at 04:00


Amlodipine Besylate (Norvasc) 10 mg DAILY PO  Last administered on 4/3/19at 


09:39; Admin Dose 10 MG;  Start 4/2/19 at 09:00


Mycophenolate Sodium (Myfortic) 360 mg BID PO  Last administered on 4/3/19at 


09:38; Admin Dose 360 MG;  Start 4/2/19 at 09:00


Pantoprazole (Protonix Tab) 40 mg BID@0600,1800 PO ;  Start 4/2/19 at 06:00;  


Status Hold


Prednisone (Prednisone) 5 mg DAILY PO  Last administered on 4/3/19at 09:38; 


Admin Dose 5 MG;  Start 4/2/19 at 09:00


Tacrolimus (Prograf) 2 mg BID PO  Last administered on 4/3/19at 09:38; Admin 


Dose 2 MG;  Start 4/2/19 at 09:00


Pantoprazole (Protonix Iv) 40 mg BID@06,18 IV  Last administered on 4/3/19at 


04:27; Admin Dose 40 MG;  Start 4/2/19 at 18:00


Prochlorperazine (Compazine Inj) 5 mg Q4H  PRN IV NAUSEA AND/OR VOMITING Last 


administered on 4/3/19at 04:15; Admin Dose 5 MG;  Start 4/2/19 at 07:30


Sucralfate (Carafate Susp) 1 gm QID PO ;  Start 4/3/19 at 13:00











BETH CHINCHILLA NP             Apr 3, 2019 14:32

## 2019-04-03 NOTE — PN
DATE:  04/03/2019

 

 

SUBJECTIVE:  The patient continues to have intractable nausea, although mildly improved.  The patient
 denies any abdominal pain.  No hemoptysis, hematemesis or hematochezia.

 

OBJECTIVE:

VITAL SIGNS:  Blood pressure is 143/91, respirations 20, pulse 119, temperature 98.4.

HEENT:  Head is normocephalic.

NECK:  Supple.

HEART:  Tachycardic.

LUNGS:  Show diminished breath sounds at the base.

ABDOMEN:  Soft, nontender to palpation.  No rebound or guarding.

EXTREMITIES:  Negative for clubbing, cyanosis.  No edema.

DERMATOLOGIC:  No rashes.

MUSCULOSKELETAL:  No joint effusions.

NEUROLOGIC:  No focal deficits.

 

MEDICATIONS:  Have been reviewed.

 

LABORATORY DATA:  Show sodium 144, potassium 3.6, BUN 21, creatinine 1.68.  White count 13.0, hemoglo
bin 13.7, platelet count is 181.  The patient's urinalysis shows approximately 800 mg per gram of cre
atinine.  No active sediment.

 

ASSESSMENT AND PLAN:

1.  Intractable nausea and vomiting.  Etiology may have been related to recent marijuana use, possibl
e recurrence of ulcerative esophagitis.  The patient's CT scan was reviewed.  No evidence of acute fi
ndings.  The patient is slowly improving, but continues to have ongoing nausea.  Plan is to continue 
medical management.  Continue Protonix 40 mg IV b.i.d.  Continue Zofran and Compazine.  We will consi
nimesh giving Reglan.  A GI consult has been placed with Dr. Melendez for evaluation.  Monitor closely.

2.  Nonoliguric acute kidney injury on top of chronic allograft failure with previous baseline creati
nine of 1.3 to 1.4 mg/dL.  Etiology of acute kidney injury is secondary to hemodynamics and volume de
pletion.  The patient's renal function has slowly been improving with IV fluids.  We will continue to
 monitor.  Urinalysis was reviewed.

3.  End-stage renal disease.  The patient is status post simultaneous kidney-pancreas transplant, cur
rently in acute kidney injury as stated above.  Continue current immunosuppressive regimen.  Continue
 to monitor.

4.  Systemic inflammatory response syndrome and leukocytosis, likely secondary to stress and demargin
ation.  No evidence of infection.  I appreciate infectious disease evaluation.  Continue IV hydration
.  We will continue to defer antibiotic therapy at this time.

5.  Tachycardia, likely due to pain.  Continue to monitor the patient's sinus rhythm.  Consider cardi
ology evaluation if no significant improvement.

6.  Anxiety disorder.  Continue Ativan.

7.  Hypertension.  Continue to monitor.  Continue current blood pressure regimen.

8.  Gastrointestinal and deep vein thrombosis prophylaxis.

 

 

Dictated By: JACKIE MARIO/BIANCA

DD:    04/03/2019 09:10:57

DT:    04/03/2019 13:56:14

Conf#: [id number]

DID#:  [id number]

CC: JERMAIN SOSA MD;*EndCC*

## 2019-04-03 NOTE — HPN
Date/Time of Note


Date/Time of Note


DATE: 4/3/19 


TIME: 17:50





Interval H&P Admission Note


Pt. seen H&P reviewed:  No system changes











ANAHI RESENDIZ MD                 Apr 3, 2019 17:50

## 2019-04-04 VITALS — RESPIRATION RATE: 19 BRPM | HEART RATE: 105 BPM | SYSTOLIC BLOOD PRESSURE: 111 MMHG | DIASTOLIC BLOOD PRESSURE: 75 MMHG

## 2019-04-04 VITALS — HEART RATE: 84 BPM | DIASTOLIC BLOOD PRESSURE: 56 MMHG | RESPIRATION RATE: 18 BRPM | SYSTOLIC BLOOD PRESSURE: 106 MMHG

## 2019-04-04 VITALS — HEART RATE: 100 BPM | DIASTOLIC BLOOD PRESSURE: 80 MMHG | SYSTOLIC BLOOD PRESSURE: 129 MMHG | RESPIRATION RATE: 18 BRPM

## 2019-04-04 VITALS — SYSTOLIC BLOOD PRESSURE: 135 MMHG | DIASTOLIC BLOOD PRESSURE: 85 MMHG | HEART RATE: 100 BPM | RESPIRATION RATE: 19 BRPM

## 2019-04-04 LAB
ADD MAN DIFF?: NO
ANION GAP: 12 (ref 5–13)
BASOPHIL #: 0.1 10^3/UL (ref 0–0.1)
BASOPHILS %: 0.5 % (ref 0–2)
BLOOD UREA NITROGEN: 19 MG/DL (ref 7–20)
CALCIUM: 9.2 MG/DL (ref 8.4–10.2)
CARBON DIOXIDE: 23 MMOL/L (ref 21–31)
CHLORIDE: 107 MMOL/L (ref 97–110)
CREATININE: 1.4 MG/DL (ref 0.61–1.24)
EOSINOPHILS #: 0 10^3/UL (ref 0–0.5)
EOSINOPHILS %: 0.1 % (ref 0–7)
GLUCOSE: 94 MG/DL (ref 70–220)
HEMATOCRIT: 41.7 % (ref 42–52)
HEMOGLOBIN: 13.7 G/DL (ref 14–18)
IMMATURE GRANS #M: 0.06 10^3/UL (ref 0–0.03)
IMMATURE GRANS % (M): 0.6 % (ref 0–0.43)
LYMPHOCYTES #: 2.4 10^3/UL (ref 0.8–2.9)
LYMPHOCYTES %: 21.6 % (ref 15–51)
MAGNESIUM: 1.7 MG/DL (ref 1.7–2.5)
MEAN CORPUSCULAR HEMOGLOBIN: 30.8 PG (ref 29–33)
MEAN CORPUSCULAR HGB CONC: 32.9 G/DL (ref 32–37)
MEAN CORPUSCULAR VOLUME: 93.7 FL (ref 82–101)
MEAN PLATELET VOLUME: 10.8 FL (ref 7.4–10.4)
MICROALBUMIN/CREATININE RATIO: 106 (ref ?–30)
MICROALBUMIN: 1.9 MG/DL
MONOCYTE #: 1.2 10^3/UL (ref 0.3–0.9)
MONOCYTES %: 10.9 % (ref 0–11)
NEUTROPHIL #: 7.2 10^3/UL (ref 1.6–7.5)
NEUTROPHILS %: 66.3 % (ref 39–77)
NUCLEATED RED BLOOD CELLS #: 0 10^3/UL (ref 0–0)
NUCLEATED RED BLOOD CELLS%: 0 /100WBC (ref 0–0)
PHOSPHORUS: 3 MG/DL (ref 2.5–4.9)
PLATELET COUNT: 191 10^3/UL (ref 140–415)
POTASSIUM: 3.4 MMOL/L (ref 3.5–5.1)
RED BLOOD COUNT: 4.45 10^6/UL (ref 4.7–6.1)
RED CELL DISTRIBUTION WIDTH: 13.7 % (ref 11.5–14.5)
SODIUM: 142 MMOL/L (ref 135–144)
WHITE BLOOD COUNT: 10.9 10^3/UL (ref 4.8–10.8)

## 2019-04-04 RX ADMIN — SUCRALFATE SCH GM: 1 SUSPENSION ORAL at 20:44

## 2019-04-04 RX ADMIN — SUCRALFATE 1 GM: 1 SUSPENSION ORAL at 09:34

## 2019-04-04 RX ADMIN — AMLODIPINE BESYLATE SCH MG: 10 TABLET ORAL at 09:35

## 2019-04-04 RX ADMIN — PANTOPRAZOLE SODIUM 1 MG: 40 INJECTION, POWDER, FOR SOLUTION INTRAVENOUS at 17:30

## 2019-04-04 RX ADMIN — PANTOPRAZOLE SODIUM 1 MG: 40 INJECTION, POWDER, FOR SOLUTION INTRAVENOUS at 05:53

## 2019-04-04 RX ADMIN — TACROLIMUS SCH MG: 1 CAPSULE ORAL at 20:44

## 2019-04-04 RX ADMIN — MYCOPHENOLIC ACID 1 MG: 180 TABLET, DELAYED RELEASE ORAL at 09:35

## 2019-04-04 RX ADMIN — TACROLIMUS SCH MG: 1 CAPSULE ORAL at 09:35

## 2019-04-04 RX ADMIN — FOLIC ACID SCH MLS/HR: 5 INJECTION, SOLUTION INTRAMUSCULAR; INTRAVENOUS; SUBCUTANEOUS at 06:00

## 2019-04-04 RX ADMIN — MYCOPHENOLIC ACID SCH MG: 180 TABLET, DELAYED RELEASE ORAL at 09:35

## 2019-04-04 RX ADMIN — POTASSIUM CHLORIDE 1 MEQ: 1500 TABLET, EXTENDED RELEASE ORAL at 09:41

## 2019-04-04 RX ADMIN — SUCRALFATE 1 GM: 1 SUSPENSION ORAL at 17:31

## 2019-04-04 RX ADMIN — SUCRALFATE 1 GM: 1 SUSPENSION ORAL at 20:44

## 2019-04-04 RX ADMIN — METOCLOPRAMIDE HYDROCHLORIDE SCH MG: 10 INJECTION, SOLUTION INTRAMUSCULAR; INTRAVENOUS at 00:45

## 2019-04-04 RX ADMIN — PANTOPRAZOLE SODIUM SCH MG: 40 INJECTION, POWDER, FOR SOLUTION INTRAVENOUS at 05:53

## 2019-04-04 RX ADMIN — SUCRALFATE SCH GM: 1 SUSPENSION ORAL at 17:31

## 2019-04-04 RX ADMIN — SUCRALFATE SCH GM: 1 SUSPENSION ORAL at 09:34

## 2019-04-04 RX ADMIN — MYCOPHENOLIC ACID SCH MG: 180 TABLET, DELAYED RELEASE ORAL at 20:45

## 2019-04-04 RX ADMIN — MYCOPHENOLIC ACID 1 MG: 180 TABLET, DELAYED RELEASE ORAL at 20:45

## 2019-04-04 RX ADMIN — SUCRALFATE SCH GM: 1 SUSPENSION ORAL at 14:16

## 2019-04-04 RX ADMIN — THIAMINE HYDROCHLORIDE 1 MLS/HR: 100 INJECTION, SOLUTION INTRAMUSCULAR; INTRAVENOUS at 06:00

## 2019-04-04 RX ADMIN — TACROLIMUS 1 MG: 1 CAPSULE ORAL at 20:44

## 2019-04-04 RX ADMIN — METOCLOPRAMIDE HYDROCHLORIDE 1 MG: 10 INJECTION, SOLUTION INTRAMUSCULAR; INTRAVENOUS at 14:16

## 2019-04-04 RX ADMIN — AMLODIPINE BESYLATE 1 MG: 10 TABLET ORAL at 09:35

## 2019-04-04 RX ADMIN — PANTOPRAZOLE SODIUM SCH MG: 40 INJECTION, POWDER, FOR SOLUTION INTRAVENOUS at 17:30

## 2019-04-04 RX ADMIN — METOCLOPRAMIDE HYDROCHLORIDE 1 MG: 10 INJECTION, SOLUTION INTRAMUSCULAR; INTRAVENOUS at 17:31

## 2019-04-04 RX ADMIN — TACROLIMUS 1 MG: 1 CAPSULE ORAL at 09:35

## 2019-04-04 RX ADMIN — METOCLOPRAMIDE HYDROCHLORIDE SCH MG: 10 INJECTION, SOLUTION INTRAMUSCULAR; INTRAVENOUS at 05:53

## 2019-04-04 RX ADMIN — METOCLOPRAMIDE HYDROCHLORIDE SCH MG: 10 INJECTION, SOLUTION INTRAMUSCULAR; INTRAVENOUS at 17:31

## 2019-04-04 RX ADMIN — SUCRALFATE 1 GM: 1 SUSPENSION ORAL at 14:16

## 2019-04-04 RX ADMIN — METOCLOPRAMIDE HYDROCHLORIDE SCH MG: 10 INJECTION, SOLUTION INTRAMUSCULAR; INTRAVENOUS at 14:16

## 2019-04-04 RX ADMIN — METOCLOPRAMIDE HYDROCHLORIDE 1 MG: 10 INJECTION, SOLUTION INTRAMUSCULAR; INTRAVENOUS at 00:45

## 2019-04-04 RX ADMIN — METOCLOPRAMIDE HYDROCHLORIDE 1 MG: 10 INJECTION, SOLUTION INTRAMUSCULAR; INTRAVENOUS at 05:53

## 2019-04-04 NOTE — CONS
Assessment/Plan


Assessment/Plan


Hospital Course (Demo Recall)


No acute events overnight, patient is sleeping he is in no distress no fevers 


overnight WBC went down to 10.9 no shift no bands BUN 19 creatinine 1.40





Urinalysis was negative for nitrite leukocyte Estrace





CT abdomen and pelvis revealed colonic diverticulosis no acute diverticulitis 


left pelvic renal transplant multiple bilateral nonobstructive native renal 


calculi mild nonspecific renal transplant hydronephrosis.  Mild enlargement of 


the prostate.  Large right hydrocephaly.  Please see full report in the chart





Physical examination: This is a wasted well-developed ill-appearing middle-aged 


man who is alert in no distress.  Head atraumatic normocephalic.  Neck is 


supple.  Chest rise symmetrical breath sounds clear.  Heart: S1-S2.  Abdomen 


soft bowel sounds present.  Extremities without cyanosis.





Assessment:


1.  Systemic inflammatory response syndrome


2.  Intractable nausea status post vomiting


3.  Acute on chronic kidney disease


4.  History of kidney/pancreatic transplant, on immunosuppressive therapy


5.  Hypertension


6.  Anxiety





Plan: Patient is stable, no evidence of active infection, WBC trending down, he 


is off antibiotics, gastroenterology on case,  he is status post EGD yesterday 


that revealed severe esophagitis, gastritis and gastroparesis.  Gastroenterology


on case, H. pylori negative, biopsy revealed no malignancy





Consultation Date/Type/Reason


Admit Date/Time


Apr 4, 2019 at 09:43


Initial Consult Date


4/3/19


Type of Consult


id


Date/Time of Note


DATE: 4/4/19 


TIME: 14:06





Exam/Review of Systems


Exam


Vitals





Vital Signs


  Date      Temp  Pulse  Resp  B/P (MAP)   Pulse Ox  O2          O2 Flow    FiO2


Time                                                 Delivery    Rate


    4/4/19  98.3    100    19      135/85        95


     08:23                          (102)


    4/3/19                                           Room Air


     20:00








Intake and Output





4/3/19


4/3/19


4/4/19





1515:00


23:00


07:00





IntakeIntake Total


1650 ml


458 ml


1200 ml





OutputOutput Total


600 ml


500 ml


620 ml





BalanceBalance


1050 ml


-42 ml


580 ml














Results


Result Diagram:  


4/4/19 0439                                                                     


          4/4/19 0439





Results 24hrs





Laboratory Tests


               Test
                                4/4/19
04:39


               White Blood Count                         10.9  H


               Red Blood Count                           4.45  L


               Hemoglobin                                13.7  L


               Hematocrit                                41.7  L


               Mean Corpuscular Volume                    93.7


               Mean Corpuscular Hemoglobin                30.8


               Mean Corpuscular Hemoglobin
Concent       32.9  



               Red Cell Distribution Width                13.7


               Platelet Count                              191


               Mean Platelet Volume                      10.8  H


               Immature Granulocytes %                  0.600  H


               Neutrophils %                              66.3


               Lymphocytes %                              21.6


               Monocytes %                                10.9


               Eosinophils %                               0.1


               Basophils %                                 0.5


               Nucleated Red Blood Cells %                 0.0


               Immature Granulocytes #                  0.060  H


               Neutrophils #                               7.2


               Lymphocytes #                               2.4


               Monocytes #                                1.2  H


               Eosinophils #                               0.0


               Basophils #                                 0.1


               Nucleated Red Blood Cells #                 0.0


               Sodium Level                                142


               Potassium Level                            3.4  L


               Chloride Level                              107


               Carbon Dioxide Level                         23


               Anion Gap                                    12


               Blood Urea Nitrogen                          19


               Creatinine                                1.40  H


               Est Glomerular Filtrat Rate
mL/min         53  L



               Glucose Level                                94


               Calcium Level                               9.2


               Phosphorus Level                            3.0


               Magnesium Level                             1.7








Medications


Medication





Current Medications


Sodium Chloride 1,000 ml @  40 mls/hr Q24H IV  Last administered on 4/4/19at 


06:00; Admin Dose 100 MLS/HR;  Start 4/2/19 at 04:00


Ondansetron HCl (Zofran Inj) 4 mg Q6H  PRN IV NAUSEA AND/OR VOMITING Last 


administered on 4/3/19at 09:39; Admin Dose 4 MG;  Start 4/2/19 at 04:00


Morphine Sulfate (morphine) 2 mg Q4H  PRN IV SEVERE PAIN LEVEL 7-10 Last 


administered on 4/3/19at 12:33; Admin Dose 2 MG;  Start 4/2/19 at 04:00


Alprazolam (Xanax) 1 mg TID  PRN PO ANXIETY Last administered on 4/3/19at 01:29;


Admin Dose 1 MG;  Start 4/2/19 at 04:00


Amlodipine Besylate (Norvasc) 10 mg DAILY PO  Last administered on 4/4/19at 


09:35; Admin Dose 10 MG;  Start 4/2/19 at 09:00


Mycophenolate Sodium (Myfortic) 360 mg BID PO  Last administered on 4/4/19at 


09:35; Admin Dose 360 MG;  Start 4/2/19 at 09:00


Pantoprazole (Protonix Tab) 40 mg BID@0600,1800 PO ;  Start 4/2/19 at 06:00;  


Status Hold


Prednisone (Prednisone) 5 mg DAILY PO  Last administered on 4/4/19at 09:34; 


Admin Dose 5 MG;  Start 4/2/19 at 09:00


Tacrolimus (Prograf) 2 mg BID PO  Last administered on 4/4/19at 09:35; Admin 


Dose 2 MG;  Start 4/2/19 at 09:00


Pantoprazole (Protonix Iv) 40 mg BID@06,18 IV  Last administered on 4/4/19at 


05:53; Admin Dose 40 MG;  Start 4/2/19 at 18:00


Prochlorperazine (Compazine Inj) 5 mg Q4H  PRN IV NAUSEA AND/OR VOMITING Last 


administered on 4/3/19at 04:15; Admin Dose 5 MG;  Start 4/2/19 at 07:30


Sucralfate (Carafate Susp) 1 gm QID PO  Last administered on 4/4/19at 09:34; 


Admin Dose 1 GM;  Start 4/3/19 at 13:00


Metoclopramide HCl (Reglan) 10 mg Q6 IV  Last administered on 4/4/19at 05:53; 


Admin Dose 10 MG;  Start 4/3/19 at 18:00











BETH CHINCHILLA NP             Apr 4, 2019 14:08

## 2019-04-04 NOTE — PN
DATE:  04/04/2019

 

 

SUBJECTIVE:  The patient had EGD yesterday which showed evidence of esophagitis, gastritis.  The eva
ent is clinically improving with current antiemetics.  The patient feels he can try advancing diet.  
No other events noted.

 

OBJECTIVE:

VITAL SIGNS:  Blood pressure is 132/81, respiration 19, pulse 100, temperature 98.2.

HEENT:  Head is normocephalic.

NECK:  Supple.

HEART:  Regular rate.

LUNGS:  Show diminished breath sounds at the base.

ABDOMEN:  Soft, nontender to palpation.  No rebound or guarding.

EXTREMITIES:  Negative for clubbing, cyanosis, no edema.

DERMATOLOGIC:  No rashes.

MUSCULOSKELETAL:  No joint effusion.

NEUROLOGIC:  No change in exam.

 

MEDICATIONS:  Have been reviewed.

 

LABORATORY DATA:  Has been reviewed.

 

ASSESSMENT AND PLAN:

1.  Intractable nausea, vomiting.  Etiology secondary to esophagitis, gastritis and recent marijuana 
use.  The patient clinically improving status post EGD.  Continue proton pump inhibitor.  Continue Ca
rafate.  Continue antiemetics.

2.  Nonoliguric kidney injury on top of chronic allograft failure with previous baseline creatinine o
f 1.3 to 1.4 mg/dL.  Etiology of acute kidney injury is secondary to hemodynamics, volume depletion. 
 Renal function is returning back to baseline with IV fluids, continue to monitor.

3.  End-stage renal disease.  The patient is status post simultaneous kidney and pancreatic transplan
t.  The patient currently in acute kidney injury as stated above.  Continue current immunosuppressive
 regimen.  Continue to monitor.

4.  Leukocytosis, systemic inflammatory response syndrome.  Etiology is likely due to stress demargin
ation.  No evidence of infection.  Continue to monitor.

5.  Anxiety disorder.  Continue Ativan.

6.  Hypertension.  Blood pressure is controlled.

7.  Hypokalemia, replete potassium chloride.  

8.  Gastrointestinal and deep venous thrombosis prophylaxis.

 

 

Dictated By: JACKIE BELL DO

 

NR/NTS

DD:    04/04/2019 08:33:35

DT:    04/04/2019 08:51:38

Conf#: [id number]

DID#:  [id number]

CC: JERMAIN SOSA MD;*EndCC*

## 2019-04-04 NOTE — PN
Date/Time of Note


Date/Time of Note


DATE: 4/4/19 


TIME: 13:05





Assessment/Plan


VTE Prophylaxis


Risk score (from Nsg)>0 risk:  1


SCD applied (from Nsg):  Yes


Pharmacological prophylaxis:  other (scds)





Lines/Catheters


IV Catheter Type (from Nrsg):  Peripheral IV


Urinary Cath still in place:  No





Assessment/Plan


Hospital Course


Summary Assessment and Plan:


Assessment:


Intractable nausea/vomiting-cannabinoid hyperemesis syndrome versus 


gastroparesis versus other


   EGD 4/3/19


   Severe ulcerative esophagitis, gastritis


   Gastroparesis, biopsies taken


   S/p EGD 2/23/18- Impression: Severe ulcerated esophagitis.Small hiatal 


hernia. Moderate gastritis. 


   Gastric body and antrum biopsy: Negative for h.pylori or malignancy  


   Esophagus biopsy: Acute esophagitis.  For virus inclusions/parasites negative


for malignancy.


SIRS with leukocytosis 


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


   -Chronic prednisone use


HTN


Anxiety


History of Marijuana use- quit for 2-3 months and recently started smoking again


   -Discussed abstinence of marijuana





Plan:


Continue PPI/Carafate


Gastric emptying study pending


Await biopsy results


Continue Reglan


Patient seen in collaboration with Dr. Melendez








Subjective:


Course reviewed with nursing staff


Patient interviewed and examined


All labs, imaging and other results reviewed





The patient resting in bed, states he feels better


Will continue same regimen. Discussed results of EGD and plan for NM gastric 


emptying study


pt verbalized understanding





PHYSICAL EXAMINATION:


GENERAL: Alert & oriented x 3,


SKIN: No lesions


HEAD: Normocephalic, atraumatic, no tenderness.


EYES: Pupils equal reactive to light, no discharge.


EARS/NOSE AND THROAT: Ears normal, nose normal, oropharynx normal


NECK: Supple, no masses


CHEST: Inspection within normal limits.


CARDIOVASCULAR: Heart: Regular rate and rhythm


RESPIRATORY: Lungs clear to auscultation


GASTROINTESTINAL AND LIVER: Abdomen: Soft, non tenderness, non-distended, no 


hernias, no masses, no organomegaly, no ascites, no guarding, no rebound 


tenderness, normoactive bowel sounds. Rectal: Deferred. 


EXTREMITIES: No cyanosis, clubbing or edema.


Result Diagram:  


4/4/19 0439                                                                     


          4/4/19 0439





Results 24hrs





Laboratory Tests


               Test
                                4/4/19
04:39


               White Blood Count                         10.9  H


               Red Blood Count                           4.45  L


               Hemoglobin                                13.7  L


               Hematocrit                                41.7  L


               Mean Corpuscular Volume                    93.7


               Mean Corpuscular Hemoglobin                30.8


               Mean Corpuscular Hemoglobin
Concent       32.9  



               Red Cell Distribution Width                13.7


               Platelet Count                              191


               Mean Platelet Volume                      10.8  H


               Immature Granulocytes %                  0.600  H


               Neutrophils %                              66.3


               Lymphocytes %                              21.6


               Monocytes %                                10.9


               Eosinophils %                               0.1


               Basophils %                                 0.5


               Nucleated Red Blood Cells %                 0.0


               Immature Granulocytes #                  0.060  H


               Neutrophils #                               7.2


               Lymphocytes #                               2.4


               Monocytes #                                1.2  H


               Eosinophils #                               0.0


               Basophils #                                 0.1


               Nucleated Red Blood Cells #                 0.0


               Sodium Level                                142


               Potassium Level                            3.4  L


               Chloride Level                              107


               Carbon Dioxide Level                         23


               Anion Gap                                    12


               Blood Urea Nitrogen                          19


               Creatinine                                1.40  H


               Est Glomerular Filtrat Rate
mL/min         53  L



               Glucose Level                                94


               Calcium Level                               9.2


               Phosphorus Level                            3.0


               Magnesium Level                             1.7








Exam/Review of Systems


Exam


Vitals





Vital Signs


  Date      Temp  Pulse  Resp  B/P (MAP)   Pulse Ox  O2          O2 Flow    FiO2


Time                                                 Delivery    Rate


    4/4/19  98.3    100    19      135/85        95


     08:23                          (102)


    4/3/19                                           Room Air


     20:00








Intake and Output





4/3/19


4/3/19


4/4/19





1515:00


23:00


07:00





IntakeIntake Total


1650 ml


458 ml


1200 ml





OutputOutput Total


600 ml


500 ml


620 ml





BalanceBalance


1050 ml


-42 ml


580 ml














Results


Results 24hrs





Laboratory Tests


               Test
                                4/4/19
04:39


               White Blood Count                         10.9  H


               Red Blood Count                           4.45  L


               Hemoglobin                                13.7  L


               Hematocrit                                41.7  L


               Mean Corpuscular Volume                    93.7


               Mean Corpuscular Hemoglobin                30.8


               Mean Corpuscular Hemoglobin
Concent       32.9  



               Red Cell Distribution Width                13.7


               Platelet Count                              191


               Mean Platelet Volume                      10.8  H


               Immature Granulocytes %                  0.600  H


               Neutrophils %                              66.3


               Lymphocytes %                              21.6


               Monocytes %                                10.9


               Eosinophils %                               0.1


               Basophils %                                 0.5


               Nucleated Red Blood Cells %                 0.0


               Immature Granulocytes #                  0.060  H


               Neutrophils #                               7.2


               Lymphocytes #                               2.4


               Monocytes #                                1.2  H


               Eosinophils #                               0.0


               Basophils #                                 0.1


               Nucleated Red Blood Cells #                 0.0


               Sodium Level                                142


               Potassium Level                            3.4  L


               Chloride Level                              107


               Carbon Dioxide Level                         23


               Anion Gap                                    12


               Blood Urea Nitrogen                          19


               Creatinine                                1.40  H


               Est Glomerular Filtrat Rate
mL/min         53  L



               Glucose Level                                94


               Calcium Level                               9.2


               Phosphorus Level                            3.0


               Magnesium Level                             1.7








Medications


Medication





Current Medications


Sodium Chloride 1,000 ml @  40 mls/hr Q24H IV  Last administered on 4/4/19at 


06:00; Admin Dose 100 MLS/HR;  Start 4/2/19 at 04:00


Ondansetron HCl (Zofran Inj) 4 mg Q6H  PRN IV NAUSEA AND/OR VOMITING Last 


administered on 4/3/19at 09:39; Admin Dose 4 MG;  Start 4/2/19 at 04:00


Morphine Sulfate (morphine) 2 mg Q4H  PRN IV SEVERE PAIN LEVEL 7-10 Last 


administered on 4/3/19at 12:33; Admin Dose 2 MG;  Start 4/2/19 at 04:00


Alprazolam (Xanax) 1 mg TID  PRN PO ANXIETY Last administered on 4/3/19at 01:29;


Admin Dose 1 MG;  Start 4/2/19 at 04:00


Amlodipine Besylate (Norvasc) 10 mg DAILY PO  Last administered on 4/4/19at 


09:35; Admin Dose 10 MG;  Start 4/2/19 at 09:00


Mycophenolate Sodium (Myfortic) 360 mg BID PO  Last administered on 4/4/19at 


09:35; Admin Dose 360 MG;  Start 4/2/19 at 09:00


Pantoprazole (Protonix Tab) 40 mg BID@0600,1800 PO ;  Start 4/2/19 at 06:00;  


Status Hold


Prednisone (Prednisone) 5 mg DAILY PO  Last administered on 4/4/19at 09:34; 


Admin Dose 5 MG;  Start 4/2/19 at 09:00


Tacrolimus (Prograf) 2 mg BID PO  Last administered on 4/4/19at 09:35; Admin 


Dose 2 MG;  Start 4/2/19 at 09:00


Pantoprazole (Protonix Iv) 40 mg BID@06,18 IV  Last administered on 4/4/19at 


05:53; Admin Dose 40 MG;  Start 4/2/19 at 18:00


Prochlorperazine (Compazine Inj) 5 mg Q4H  PRN IV NAUSEA AND/OR VOMITING Last 


administered on 4/3/19at 04:15; Admin Dose 5 MG;  Start 4/2/19 at 07:30


Sucralfate (Carafate Susp) 1 gm QID PO  Last administered on 4/4/19at 09:34; Adm


in Dose 1 GM;  Start 4/3/19 at 13:00


Metoclopramide HCl (Reglan) 10 mg Q6 IV  Last administered on 4/4/19at 05:53; 


Admin Dose 10 MG;  Start 4/3/19 at 18:00











SENAIT BOYD              Apr 4, 2019 13:10

## 2019-04-05 VITALS — DIASTOLIC BLOOD PRESSURE: 62 MMHG | SYSTOLIC BLOOD PRESSURE: 128 MMHG | RESPIRATION RATE: 18 BRPM

## 2019-04-05 VITALS — HEART RATE: 92 BPM | SYSTOLIC BLOOD PRESSURE: 131 MMHG | DIASTOLIC BLOOD PRESSURE: 78 MMHG | RESPIRATION RATE: 19 BRPM

## 2019-04-05 VITALS — HEART RATE: 90 BPM | SYSTOLIC BLOOD PRESSURE: 161 MMHG | DIASTOLIC BLOOD PRESSURE: 90 MMHG | RESPIRATION RATE: 18 BRPM

## 2019-04-05 LAB
ADD MAN DIFF?: NO
ANION GAP: 10 (ref 5–13)
BASOPHIL #: 0 10^3/UL (ref 0–0.1)
BASOPHILS %: 0.4 % (ref 0–2)
BLOOD UREA NITROGEN: 17 MG/DL (ref 7–20)
CALCIUM: 9.3 MG/DL (ref 8.4–10.2)
CARBON DIOXIDE: 27 MMOL/L (ref 21–31)
CHLORIDE: 105 MMOL/L (ref 97–110)
CREATININE: 1.21 MG/DL (ref 0.61–1.24)
EOSINOPHILS #: 0 10^3/UL (ref 0–0.5)
EOSINOPHILS %: 0.3 % (ref 0–7)
GLUCOSE: 88 MG/DL (ref 70–220)
HEMATOCRIT: 41.8 % (ref 42–52)
HEMOGLOBIN: 13.9 G/DL (ref 14–18)
IMMATURE GRANS #M: 0.05 10^3/UL (ref 0–0.03)
IMMATURE GRANS % (M): 0.7 % (ref 0–0.43)
LYMPHOCYTES #: 1.6 10^3/UL (ref 0.8–2.9)
LYMPHOCYTES %: 21.1 % (ref 15–51)
MAGNESIUM: 1.5 MG/DL (ref 1.7–2.5)
MEAN CORPUSCULAR HEMOGLOBIN: 30.9 PG (ref 29–33)
MEAN CORPUSCULAR HGB CONC: 33.3 G/DL (ref 32–37)
MEAN CORPUSCULAR VOLUME: 92.9 FL (ref 82–101)
MEAN PLATELET VOLUME: 10.8 FL (ref 7.4–10.4)
MONOCYTE #: 0.8 10^3/UL (ref 0.3–0.9)
MONOCYTES %: 11.1 % (ref 0–11)
NEUTROPHIL #: 4.9 10^3/UL (ref 1.6–7.5)
NEUTROPHILS %: 66.4 % (ref 39–77)
NUCLEATED RED BLOOD CELLS #: 0 10^3/UL (ref 0–0)
NUCLEATED RED BLOOD CELLS%: 0 /100WBC (ref 0–0)
PHOSPHORUS: 2.6 MG/DL (ref 2.5–4.9)
PLATELET COUNT: 183 10^3/UL (ref 140–415)
POTASSIUM: 3.4 MMOL/L (ref 3.5–5.1)
RED BLOOD COUNT: 4.5 10^6/UL (ref 4.7–6.1)
RED CELL DISTRIBUTION WIDTH: 13.2 % (ref 11.5–14.5)
SODIUM: 142 MMOL/L (ref 135–144)
WHITE BLOOD COUNT: 7.4 10^3/UL (ref 4.8–10.8)

## 2019-04-05 RX ADMIN — TACROLIMUS 1 MG: 1 CAPSULE ORAL at 09:56

## 2019-04-05 RX ADMIN — METOCLOPRAMIDE HYDROCHLORIDE SCH MG: 10 INJECTION, SOLUTION INTRAMUSCULAR; INTRAVENOUS at 05:50

## 2019-04-05 RX ADMIN — PANTOPRAZOLE SODIUM SCH MG: 40 INJECTION, POWDER, FOR SOLUTION INTRAVENOUS at 05:50

## 2019-04-05 RX ADMIN — METOCLOPRAMIDE HYDROCHLORIDE 1 MG: 10 INJECTION, SOLUTION INTRAMUSCULAR; INTRAVENOUS at 05:50

## 2019-04-05 RX ADMIN — FOLIC ACID SCH MLS/HR: 5 INJECTION, SOLUTION INTRAMUSCULAR; INTRAVENOUS; SUBCUTANEOUS at 05:57

## 2019-04-05 RX ADMIN — METOCLOPRAMIDE HYDROCHLORIDE 1 MG: 10 INJECTION, SOLUTION INTRAMUSCULAR; INTRAVENOUS at 13:08

## 2019-04-05 RX ADMIN — POTASSIUM CHLORIDE 1 MEQ: 1500 TABLET, EXTENDED RELEASE ORAL at 09:56

## 2019-04-05 RX ADMIN — SUCRALFATE 1 GM: 1 SUSPENSION ORAL at 09:56

## 2019-04-05 RX ADMIN — MYCOPHENOLIC ACID 1 MG: 180 TABLET, DELAYED RELEASE ORAL at 09:56

## 2019-04-05 RX ADMIN — SUCRALFATE SCH GM: 1 SUSPENSION ORAL at 09:56

## 2019-04-05 RX ADMIN — SUCRALFATE SCH GM: 1 SUSPENSION ORAL at 13:08

## 2019-04-05 RX ADMIN — METOCLOPRAMIDE HYDROCHLORIDE SCH MG: 10 INJECTION, SOLUTION INTRAMUSCULAR; INTRAVENOUS at 13:08

## 2019-04-05 RX ADMIN — SUCRALFATE 1 GM: 1 SUSPENSION ORAL at 13:08

## 2019-04-05 RX ADMIN — METOCLOPRAMIDE HYDROCHLORIDE SCH MG: 10 INJECTION, SOLUTION INTRAMUSCULAR; INTRAVENOUS at 00:26

## 2019-04-05 RX ADMIN — AMLODIPINE BESYLATE 1 MG: 10 TABLET ORAL at 09:57

## 2019-04-05 RX ADMIN — MAGNESIUM SULFATE HEPTAHYDRATE 1 MLS/HR: 40 INJECTION, SOLUTION INTRAVENOUS at 09:58

## 2019-04-05 RX ADMIN — AMLODIPINE BESYLATE SCH MG: 10 TABLET ORAL at 09:57

## 2019-04-05 RX ADMIN — TACROLIMUS SCH MG: 1 CAPSULE ORAL at 09:56

## 2019-04-05 RX ADMIN — THIAMINE HYDROCHLORIDE 1 MLS/HR: 100 INJECTION, SOLUTION INTRAMUSCULAR; INTRAVENOUS at 05:57

## 2019-04-05 RX ADMIN — METOCLOPRAMIDE HYDROCHLORIDE 1 MG: 10 INJECTION, SOLUTION INTRAMUSCULAR; INTRAVENOUS at 00:26

## 2019-04-05 RX ADMIN — PANTOPRAZOLE SODIUM 1 MG: 40 INJECTION, POWDER, FOR SOLUTION INTRAVENOUS at 05:50

## 2019-04-05 RX ADMIN — MYCOPHENOLIC ACID SCH MG: 180 TABLET, DELAYED RELEASE ORAL at 09:56

## 2019-04-05 NOTE — CONS
Assessment/Plan


Assessment/Plan


Hospital Course (Demo Recall)


No acute events, alert, feels good, no fevers, no nausea no diarrhea





Urinalysis was negative for nitrite leukocyte Estrace





CT abdomen and pelvis revealed colonic diverticulosis no acute diverticulitis 


left pelvic renal transplant multiple bilateral nonobstructive native renal 


calculi mild nonspecific renal transplant hydronephrosis.  Mild enlargement of 


the prostate.  Large right hydrocephaly.  Please see full report in the chart





Physical examination: This is a wasted well-developed ill-appearing middle-aged 


man who is alert in no distress.  Head atraumatic normocephalic.  Neck is sup


ple.  Chest rise symmetrical breath sounds clear.  Heart: S1-S2.  Abdomen soft 


bowel sounds present.  Extremities without cyanosis.





Assessment:


1.  Systemic inflammatory response syndrome


2.  Intractable nausea status post vomiting, resolving==> EGD  revealed severe 


esophagitis, gastritis and gastroparesis.  Gastroenterology on case, H. pylori 


negative, biopsy revealed no malignancy


3.  Acute on chronic kidney disease


4.  History of kidney/pancreatic transplant, on immunosuppressive therapy


5.  Hypertension


6.  Anxiety





Plan: Doing better, continue present care, observe off antibiotics





Consultation Date/Type/Reason


Admit Date/Time


Apr 4, 2019 at 09:43


Initial Consult Date


4/3/19


Type of Consult


id


Date/Time of Note


DATE: 4/5/19 


TIME: 14:53





Exam/Review of Systems


Exam


Vitals





Vital Signs


  Date      Temp  Pulse  Resp  B/P (MAP)   Pulse Ox  O2          O2 Flow    FiO2


Time                                                 Delivery    Rate


    4/5/19  98.2     92    19      131/78        99


     07:27                           (95)


    4/4/19                                           Room Air


     15:30








Intake and Output





4/4/19 4/4/19 4/5/19





1515:00


23:00


07:00





IntakeIntake Total


590 ml


660 ml


650 ml





OutputOutput Total


600 ml


900 ml


200 ml





BalanceBalance


-10 ml


-240 ml


450 ml














Results


Result Diagram:  


4/5/19 0427                                                                     


          4/5/19 0427





Results 24hrs





Laboratory Tests


               Test
                                4/5/19
04:27


               White Blood Count                          7.4  #


               Red Blood Count                           4.50  L


               Hemoglobin                                13.9  L


               Hematocrit                                41.8  L


               Mean Corpuscular Volume                    92.9


               Mean Corpuscular Hemoglobin                30.9


               Mean Corpuscular Hemoglobin
Concent       33.3  



               Red Cell Distribution Width                13.2


               Platelet Count                              183


               Mean Platelet Volume                      10.8  H


               Immature Granulocytes %                  0.700  H


               Neutrophils %                              66.4


               Lymphocytes %                              21.1


               Monocytes %                               11.1  H


               Eosinophils %                               0.3


               Basophils %                                 0.4


               Nucleated Red Blood Cells %                 0.0


               Immature Granulocytes #                  0.050  H


               Neutrophils #                               4.9


               Lymphocytes #                               1.6


               Monocytes #                                 0.8


               Eosinophils #                               0.0


               Basophils #                                 0.0


               Nucleated Red Blood Cells #                 0.0


               Sodium Level                                142


               Potassium Level                            3.4  L


               Chloride Level                              105


               Carbon Dioxide Level                         27


               Anion Gap                                    10


               Blood Urea Nitrogen                          17


               Creatinine                                 1.21


               Est Glomerular Filtrat Rate
mL/min   > 60  



               Glucose Level                                88


               Calcium Level                               9.3


               Phosphorus Level                            2.6


               Magnesium Level                            1.5  L








Medications


Medication





Current Medications


Sodium Chloride 1,000 ml @  40 mls/hr Q24H IV  Last administered on 4/5/19at 


05:57; Admin Dose 40 MLS/HR;  Start 4/2/19 at 04:00


Ondansetron HCl (Zofran Inj) 4 mg Q6H  PRN IV NAUSEA AND/OR VOMITING Last 


administered on 4/3/19at 09:39; Admin Dose 4 MG;  Start 4/2/19 at 04:00


Morphine Sulfate (morphine) 2 mg Q4H  PRN IV SEVERE PAIN LEVEL 7-10 Last 


administered on 4/3/19at 12:33; Admin Dose 2 MG;  Start 4/2/19 at 04:00


Alprazolam (Xanax) 1 mg TID  PRN PO ANXIETY Last administered on 4/3/19at 01:29;


Admin Dose 1 MG;  Start 4/2/19 at 04:00


Amlodipine Besylate (Norvasc) 10 mg DAILY PO  Last administered on 4/5/19at 


09:57; Admin Dose 10 MG;  Start 4/2/19 at 09:00


Mycophenolate Sodium (Myfortic) 360 mg BID PO  Last administered on 4/5/19at 


09:56; Admin Dose 360 MG;  Start 4/2/19 at 09:00


Pantoprazole (Protonix Tab) 40 mg BID@0600,1800 PO ;  Start 4/2/19 at 06:00;  


Status Hold


Prednisone (Prednisone) 5 mg DAILY PO  Last administered on 4/5/19at 09:57; Adm


in Dose 5 MG;  Start 4/2/19 at 09:00


Tacrolimus (Prograf) 2 mg BID PO  Last administered on 4/5/19at 09:56; Admin 


Dose 2 MG;  Start 4/2/19 at 09:00


Pantoprazole (Protonix Iv) 40 mg BID@06,18 IV  Last administered on 4/5/19at 


05:50; Admin Dose 40 MG;  Start 4/2/19 at 18:00


Prochlorperazine (Compazine Inj) 5 mg Q4H  PRN IV NAUSEA AND/OR VOMITING Last 


administered on 4/3/19at 04:15; Admin Dose 5 MG;  Start 4/2/19 at 07:30


Sucralfate (Carafate Susp) 1 gm QID PO  Last administered on 4/5/19at 13:08; 


Admin Dose 1 GM;  Start 4/3/19 at 13:00


Metoclopramide HCl (Reglan) 10 mg Q6 IV  Last administered on 4/5/19at 13:08; 


Admin Dose 10 MG;  Start 4/3/19 at 18:00











BETH CHINCHILLA NP             Apr 5, 2019 14:54

## 2019-04-05 NOTE — PN
Date/Time of Note


Date/Time of Note


DATE: 4/5/19 


TIME: 13:30





Assessment/Plan


VTE Prophylaxis


Risk score (from Ns)>0 risk:  1


SCD applied (from Ns):  Yes


Pharmacological prophylaxis:  NA/contraindicated


Pharm contraindication:  bleeding





Lines/Catheters


IV Catheter Type (from [facility]):  Saline Lock


Urinary Cath still in place:  No





Assessment/Plan


Assessment/Plan


Assessment:


Intractable nausea/vomiting-cannabinoid hyperemesis syndrome versus 


gastroparesis versus other


   EGD 4/3/19


   Severe ulcerative esophagitis, gastritis


   Gastroparesis, biopsies taken


   S/p EGD 2/23/18- Impression: Severe ulcerated esophagitis.Small hiatal 


hernia. Moderate gastritis. 


   Gastric body and antrum biopsy: Negative for h.pylori or malignancy  


   Esophagus biopsy: Acute esophagitis.  For virus inclusions/parasites negative


for malignancy.


SIRS with leukocytosis 


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


   -Chronic prednisone use


HTN


Anxiety


History of Marijuana use- quit for 2-3 months and recently started smoking again


   -Discussed abstinence of marijuana





Plan:


Continue PPI/Carafate


Gastric emptying study is negative


Biopsies are negative for H. pylori


Recommend repeat EGD in 2-3 months


Patient seen in collaboration with Dr. Melendez








Subjective:


Course reviewed with nursing staff


Patient interviewed and examined


All labs, imaging and other results reviewed





The patient is doing well.  Denies abdominal pain, nausea or vomiting.  No 


evidence of overt GI bleeding.  Results of EGD and biopsies discussed with the 


patient.  Gastric imaging study was negative.  Explained the treatment plan and 


the follow-up.  Patient will schedule appointment as an outpatient for repeat 


EGD in 3 months.  Patient appears adequate for outpatient management.








PHYSICAL EXAMINATION:


GENERAL: Alert & oriented x 3,


SKIN: No lesions


HEAD: Normocephalic, atraumatic, no tenderness.


EYES: Pupils equal reactive to light, no discharge.


EARS/NOSE AND THROAT: Ears normal, nose normal, oropharynx normal


NECK: Supple, no masses


CHEST: Inspection within normal limits.


CARDIOVASCULAR: Heart: Regular rate and rhythm


RESPIRATORY: Lungs clear to auscultation


GASTROINTESTINAL AND LIVER: Abdomen: Soft, non tenderness, non-distended, no 


hernias, no masses, no organomegaly, no ascites, no guarding, no rebound 


tenderness, normoactive bowel sounds. Rectal: Deferred. 


EXTREMITIES: No cyanosis, clubbing or edema.


Result Diagram:  


4/5/19 0427                                                                     


          4/5/19 0427





Results 24hrs





Laboratory Tests


               Test
                                4/5/19
04:27


               White Blood Count                          7.4  #


               Red Blood Count                           4.50  L


               Hemoglobin                                13.9  L


               Hematocrit                                41.8  L


               Mean Corpuscular Volume                    92.9


               Mean Corpuscular Hemoglobin                30.9


               Mean Corpuscular Hemoglobin
Concent       33.3  



               Red Cell Distribution Width                13.2


               Platelet Count                              183


               Mean Platelet Volume                      10.8  H


               Immature Granulocytes %                  0.700  H


               Neutrophils %                              66.4


               Lymphocytes %                              21.1


               Monocytes %                               11.1  H


               Eosinophils %                               0.3


               Basophils %                                 0.4


               Nucleated Red Blood Cells %                 0.0


               Immature Granulocytes #                  0.050  H


               Neutrophils #                               4.9


               Lymphocytes #                               1.6


               Monocytes #                                 0.8


               Eosinophils #                               0.0


               Basophils #                                 0.0


               Nucleated Red Blood Cells #                 0.0


               Sodium Level                                142


               Potassium Level                            3.4  L


               Chloride Level                              105


               Carbon Dioxide Level                         27


               Anion Gap                                    10


               Blood Urea Nitrogen                          17


               Creatinine                                 1.21


               Est Glomerular Filtrat Rate
mL/min   > 60  



               Glucose Level                                88


               Calcium Level                               9.3


               Phosphorus Level                            2.6


               Magnesium Level                            1.5  L





CC:   DEVANG GALICIA ;





Exam/Review of Systems


Exam


Vitals





Vital Signs


  Date      Temp  Pulse  Resp  B/P (MAP)   Pulse Ox  O2          O2 Flow    FiO2


Time                                                 Delivery    Rate


    4/5/19  98.2     92    19      131/78        99


     07:27                           (95)


    4/4/19                                           Room Air


     15:30








Intake and Output





4/4/19 4/4/19 4/5/19





1515:00


23:00


07:00





IntakeIntake Total


590 ml


660 ml


650 ml





OutputOutput Total


600 ml


900 ml


200 ml





BalanceBalance


-10 ml


-240 ml


450 ml














Results


Results 24hrs





Laboratory Tests


               Test
                                4/5/19
04:27


               White Blood Count                          7.4  #


               Red Blood Count                           4.50  L


               Hemoglobin                                13.9  L


               Hematocrit                                41.8  L


               Mean Corpuscular Volume                    92.9


               Mean Corpuscular Hemoglobin                30.9


               Mean Corpuscular Hemoglobin
Concent       33.3  



               Red Cell Distribution Width                13.2


               Platelet Count                              183


               Mean Platelet Volume                      10.8  H


               Immature Granulocytes %                  0.700  H


               Neutrophils %                              66.4


               Lymphocytes %                              21.1


               Monocytes %                               11.1  H


               Eosinophils %                               0.3


               Basophils %                                 0.4


               Nucleated Red Blood Cells %                 0.0


               Immature Granulocytes #                  0.050  H


               Neutrophils #                               4.9


               Lymphocytes #                               1.6


               Monocytes #                                 0.8


               Eosinophils #                               0.0


               Basophils #                                 0.0


               Nucleated Red Blood Cells #                 0.0


               Sodium Level                                142


               Potassium Level                            3.4  L


               Chloride Level                              105


               Carbon Dioxide Level                         27


               Anion Gap                                    10


               Blood Urea Nitrogen                          17


               Creatinine                                 1.21


               Est Glomerular Filtrat Rate
mL/min   > 60  



               Glucose Level                                88


               Calcium Level                               9.3


               Phosphorus Level                            2.6


               Magnesium Level                            1.5  L








Medications


Medication





Current Medications


Sodium Chloride 1,000 ml @  40 mls/hr Q24H IV  Last administered on 4/5/19at 


05:57; Admin Dose 40 MLS/HR;  Start 4/2/19 at 04:00


Ondansetron HCl (Zofran Inj) 4 mg Q6H  PRN IV NAUSEA AND/OR VOMITING Last 


administered on 4/3/19at 09:39; Admin Dose 4 MG;  Start 4/2/19 at 04:00


Morphine Sulfate (morphine) 2 mg Q4H  PRN IV SEVERE PAIN LEVEL 7-10 Last 


administered on 4/3/19at 12:33; Admin Dose 2 MG;  Start 4/2/19 at 04:00


Alprazolam (Xanax) 1 mg TID  PRN PO ANXIETY Last administered on 4/3/19at 01:29;


Admin Dose 1 MG;  Start 4/2/19 at 04:00


Amlodipine Besylate (Norvasc) 10 mg DAILY PO  Last administered on 4/5/19at 


09:57; Admin Dose 10 MG;  Start 4/2/19 at 09:00


Mycophenolate Sodium (Myfortic) 360 mg BID PO  Last administered on 4/5/19at 


09:56; Admin Dose 360 MG;  Start 4/2/19 at 09:00


Pantoprazole (Protonix Tab) 40 mg BID@0600,1800 PO ;  Start 4/2/19 at 06:00;  


Status Hold


Prednisone (Prednisone) 5 mg DAILY PO  Last administered on 4/5/19at 09:57; 


Admin Dose 5 MG;  Start 4/2/19 at 09:00


Tacrolimus (Prograf) 2 mg BID PO  Last administered on 4/5/19at 09:56; Admin 


Dose 2 MG;  Start 4/2/19 at 09:00


Pantoprazole (Protonix Iv) 40 mg BID@06,18 IV  Last administered on 4/5/19at 


05:50; Admin Dose 40 MG;  Start 4/2/19 at 18:00


Prochlorperazine (Compazine Inj) 5 mg Q4H  PRN IV NAUSEA AND/OR VOMITING Last 


administered on 4/3/19at 04:15; Admin Dose 5 MG;  Start 4/2/19 at 07:30


Sucralfate (Carafate Susp) 1 gm QID PO  Last administered on 4/5/19at 13:08; 


Admin Dose 1 GM;  Start 4/3/19 at 13:00


Metoclopramide HCl (Reglan) 10 mg Q6 IV  Last administered on 4/5/19at 13:08; 


Admin Dose 10 MG;  Start 4/3/19 at 18:00











EZEKIEL MCDONALD NP             Apr 5, 2019 13:40

## 2019-04-05 NOTE — PN
DATE:  04/05/2019

 

 

SUBJECTIVE:  The patient is clinically improving pending a Gastrografin study this morning.  No other
 events noted.

 

OBJECTIVE:

VITAL SIGNS:  Blood pressure is 131/78, respiration 19, pulse 92, temperature 98.6.

HEENT:  Head is normocephalic.

NECK:  Supple.

HEART:  Regular rate.

LUNGS:  Show diminished breath sounds at the base.

ABDOMEN:  Soft, nontender to palpation without rebound or guarding.

EXTREMITIES:  Negative for clubbing, cyanosis, no edema.

DERMATOLOGIC:  No rashes.

MUSCULOSKELETAL:  No joint effusions.

NEUROLOGIC:  No change in exam.

 

MEDICATIONS:  Reviewed.

 

LABORATORY DATA:  Laboratory data from 04/05/2019, was reviewed.

 

ASSESSMENT AND PLAN:

1.  Intractable nausea and vomiting, resolved.  Etiology is likely secondary to gastritis, esophagiti
s, recent marijuana use.  Gastric emptying study is pending per GI, will continue to monitor.  Contin
ue PPI and Carafate.

2.  Nonoliguric acute kidney injury on top of chronic allograft failure with previous baseline creati
nine 1.3 to 1.4 mg per deciliter.  Etiology of SPENCER is secondary to hemodynamics.  Renal function is i
mproved, back to baseline.  Continue current treatment plan.  Will de-escalate IV fluids.

3.  End-stage renal disease.  The patient is status post simultaneous kidney and pancreatic transplan
t, currently in acute kidney injury as stated above.  Continue current immunosuppressive regimen.

4.  Leukocytosis, SIRS, resolved.

5.  Anxiety disorder.  Continue Ativan.

6.  Hypertension.  Blood pressure is controlled.

7.  Hypokalemia and hypomagnesemia.  Continue to monitor and replete.

8.  GI and DVT prophylaxis.

 

 

Dictated By: JACKIE MARIO/BIANCA

DD:    04/05/2019 08:21:08

DT:    04/05/2019 08:58:26

Conf#: [id number]

DID#:  [id number]

## 2019-04-18 NOTE — DS
DATE OF ADMISSION: 04/04/2019

DATE OF DISCHARGE: 04/05/2019

 

HOSPITAL COURSE:  This is a 53-year-old male well known to me with a past medical history of simultan
eous renal and pancreatic transplant in 2006, creatinine 1.3 to 1.4 mg/dL, history of CMV virus, hist
ory of cyclic vomiting syndrome, history of ulcerative esophagitis who presented to the [facility] with complaints to date of severe nausea and vomiting.  The patient upon arrival was 
noted to be in acute kidney injury.  The patient was started on IV fluids, IV antibiotics, IV Protoni
x and admitted to telemetry.  The patient while in telemetry was evaluated by gastroenterology.  EGD 
was performed and found evidence of esophageal gastritis.  The patient was continued on proton pump i
nhibitor.  Carafate was added to the patient's regimen.  The patient is clinically improved with a co
urse of medical management.  White count normalized.  The patient's acute kidney injury resolved.  Th
e patient was then subsequently discharged home with a followup in outpatient setting.  Please also n
ote the patient during hospital course had a gastric emptying study, which was within normal limits.

 

FINAL DIAGNOSES:

1.  Nausea, vomiting secondary to erosive esophagitis and gastritis.

2.  Nonoliguric acute kidney injury.  Resolved.

3.  Chronic allograft failure, stable.  Currently, with renal function back to baseline.

4.  History of end-stage renal disease status post simultaneous kidney-pancreatic transplant.

5.  Systemic inflammatory response syndrome, resolved.

6.  Anxiety disorder.

7.  Hypertension.

8.  Electrolyte abnormality, resolved.

9.  Gastrointestinal and deep vein thrombosis prophylaxis.

 

FINAL MEDICATIONS:  Please see reconciliation list.

 

CONDITION ON DISCHARGE:  At the time of discharge, the patient is stable, no acute distress.

 

 

Dictated By: JACKIE BELL DO

 

NR/NTS

DD:    04/18/2019 15:37:59

DT:    04/18/2019 21:13:53

Conf#: [id number]

DID#:  [id number]

CC: JERMAIN SOSA MD;*EndCC*

## 2019-04-23 ENCOUNTER — HOSPITAL ENCOUNTER (EMERGENCY)
Dept: HOSPITAL 91 - FTE | Age: 54
Discharge: HOME | End: 2019-04-23
Payer: MEDICARE

## 2019-04-23 ENCOUNTER — HOSPITAL ENCOUNTER (EMERGENCY)
Dept: HOSPITAL 10 - FTE | Age: 54
Discharge: HOME | End: 2019-04-23
Payer: MEDICARE

## 2019-04-23 VITALS — DIASTOLIC BLOOD PRESSURE: 65 MMHG | HEART RATE: 90 BPM | RESPIRATION RATE: 18 BRPM | SYSTOLIC BLOOD PRESSURE: 101 MMHG

## 2019-04-23 VITALS
HEIGHT: 70 IN | WEIGHT: 147.27 LBS | BODY MASS INDEX: 21.08 KG/M2 | WEIGHT: 147.27 LBS | HEIGHT: 70 IN | BODY MASS INDEX: 21.08 KG/M2

## 2019-04-23 DIAGNOSIS — I10: ICD-10-CM

## 2019-04-23 DIAGNOSIS — R11.10: Primary | ICD-10-CM

## 2019-04-23 DIAGNOSIS — N28.9: ICD-10-CM

## 2019-04-23 LAB
ADD MAN DIFF?: NO
ALANINE AMINOTRANSFERASE: 30 IU/L (ref 13–69)
ALBUMIN/GLOBULIN RATIO: 1.46
ALBUMIN: 4.7 G/DL (ref 3.3–4.9)
ALKALINE PHOSPHATASE: 77 IU/L (ref 42–121)
ANION GAP: 14 (ref 5–13)
ASPARTATE AMINO TRANSFERASE: 17 IU/L (ref 15–46)
BASOPHIL #: 0.1 10^3/UL (ref 0–0.1)
BASOPHILS %: 0.4 % (ref 0–2)
BILIRUBIN,DIRECT: 0 MG/DL (ref 0–0.2)
BILIRUBIN,TOTAL: 0.5 MG/DL (ref 0.2–1.3)
BLOOD UREA NITROGEN: 21 MG/DL (ref 7–20)
CALCIUM: 10.9 MG/DL (ref 8.4–10.2)
CARBON DIOXIDE: 26 MMOL/L (ref 21–31)
CHLORIDE: 102 MMOL/L (ref 97–110)
CREATININE: 3.12 MG/DL (ref 0.61–1.24)
EOSINOPHILS #: 0 10^3/UL (ref 0–0.5)
EOSINOPHILS %: 0.2 % (ref 0–7)
GLOBULIN: 3.2 G/DL (ref 1.3–3.2)
GLUCOSE: 122 MG/DL (ref 70–220)
HEMATOCRIT: 48 % (ref 42–52)
HEMOGLOBIN: 16 G/DL (ref 14–18)
IMMATURE GRANS #M: 0.11 10^3/UL (ref 0–0.03)
IMMATURE GRANS % (M): 0.7 % (ref 0–0.43)
LIPASE: 25 U/L (ref 23–300)
LYMPHOCYTES #: 2.4 10^3/UL (ref 0.8–2.9)
LYMPHOCYTES %: 14.8 % (ref 15–51)
MEAN CORPUSCULAR HEMOGLOBIN: 30.6 PG (ref 29–33)
MEAN CORPUSCULAR HGB CONC: 33.3 G/DL (ref 32–37)
MEAN CORPUSCULAR VOLUME: 91.8 FL (ref 82–101)
MEAN PLATELET VOLUME: 10.3 FL (ref 7.4–10.4)
MONOCYTE #: 1.2 10^3/UL (ref 0.3–0.9)
MONOCYTES %: 7.1 % (ref 0–11)
NEUTROPHIL #: 12.6 10^3/UL (ref 1.6–7.5)
NEUTROPHILS %: 76.8 % (ref 39–77)
NUCLEATED RED BLOOD CELLS #: 0 10^3/UL (ref 0–0)
NUCLEATED RED BLOOD CELLS%: 0 /100WBC (ref 0–0)
PLATELET COUNT: 294 10^3/UL (ref 140–415)
POTASSIUM: 3.8 MMOL/L (ref 3.5–5.1)
RED BLOOD COUNT: 5.23 10^6/UL (ref 4.7–6.1)
RED CELL DISTRIBUTION WIDTH: 14 % (ref 11.5–14.5)
SODIUM: 142 MMOL/L (ref 135–144)
TOTAL PROTEIN: 7.9 G/DL (ref 6.1–8.1)
WHITE BLOOD COUNT: 16.4 10^3/UL (ref 4.8–10.8)

## 2019-04-23 PROCEDURE — 85025 COMPLETE CBC W/AUTO DIFF WBC: CPT

## 2019-04-23 PROCEDURE — 83690 ASSAY OF LIPASE: CPT

## 2019-04-23 PROCEDURE — 36415 COLL VENOUS BLD VENIPUNCTURE: CPT

## 2019-04-23 PROCEDURE — 96375 TX/PRO/DX INJ NEW DRUG ADDON: CPT

## 2019-04-23 PROCEDURE — 93005 ELECTROCARDIOGRAM TRACING: CPT

## 2019-04-23 PROCEDURE — 96374 THER/PROPH/DIAG INJ IV PUSH: CPT

## 2019-04-23 PROCEDURE — 96361 HYDRATE IV INFUSION ADD-ON: CPT

## 2019-04-23 PROCEDURE — 99284 EMERGENCY DEPT VISIT MOD MDM: CPT

## 2019-04-23 PROCEDURE — 80053 COMPREHEN METABOLIC PANEL: CPT

## 2019-04-23 RX ADMIN — THIAMINE HYDROCHLORIDE 1 MLS/HR: 100 INJECTION, SOLUTION INTRAMUSCULAR; INTRAVENOUS at 12:51

## 2019-04-23 RX ADMIN — METOCLOPRAMIDE HYDROCHLORIDE 1 MG: 10 INJECTION, SOLUTION INTRAMUSCULAR; INTRAVENOUS at 12:50

## 2019-04-23 RX ADMIN — THIAMINE HYDROCHLORIDE 1 MLS/HR: 100 INJECTION, SOLUTION INTRAMUSCULAR; INTRAVENOUS at 13:53

## 2019-04-23 RX ADMIN — FAMOTIDINE 1 MG: 10 INJECTION, SOLUTION INTRAVENOUS at 12:50

## 2019-04-23 RX ADMIN — LORAZEPAM 1 MG: 2 INJECTION, SOLUTION INTRAMUSCULAR; INTRAVENOUS at 12:50

## 2019-04-23 NOTE — ERD
ER Documentation


Chief Complaint


Chief Complaint





pt is bib self with c/o fall last night in bathtub, hx transplant 2000





HPI


53-year-old male presents with a history of cyclical vomiting syndrome.  Is a 


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  


Patient has regular admissions for intractable vomiting.  Patient does have a 


history of marijuana use although has not used in 2 weeks symptoms recur.  D


enies significant abdominal pain.  He feels dehydrated.  He has a history of 


atrial fibrillation as well but feels he is in sinus rhythm.  Patient did have a


fall in the bathtub as he was vomiting but denies any pain or concerns about any


injury to his fall today.  There is no history of loss of consciousness, visual 


changes, additional concerning symptoms per the patient.





ROS


All systems reviewed and are negative except as per history of present illness.





Medications


Home Meds


Active Scripts


Alprazolam* (Xanax*) 1 Mg Tab, 1 MG PO TID, #14 TAB


   Prov:PERLA OJEDA MD         4/23/19


Metoclopramide* (Reglan*) 10 Mg Tablet, 10 MG PO Q6 PRN for NAUSEA AND/OR 


VOMITING, #20 TAB


   Prov:PERLA OJEDA MD         4/23/19


Pantoprazole* (Protonix*) 40 Mg Tablet., 40 MG PO BID, #60 TAB


   Prov:SOFIA CARNES MD         2/24/18


Reported Medications


Metoprolol Tartrate* (Lopressor*) 25 Mg Tab, 25 MG PO DAILY, #60 TAB


   4/2/19


Mycophenolate Sodium* (Mycophenolic Acid*) 360 Mg Tablet., 360 MG PO BID, TAB


   2/21/18


Tacrolimus* (Tacrolimus*) 1 Mg Capsule, 2 MG PO BID, CAP


   2/21/18


Amlodipine Besylate* (Amlodipine Besylate*) 10 Mg Tablet, 10 MG PO DAILY, #30 


TAB


   2/21/18


Prednisone* (Prednisone*) 5 Mg Tab, 5 MG PO DAILY, TAB


   2/21/18


Alprazolam* (Alprazolam*) 1 Mg Tablet, 1 MG PO TID PRN for ANXIETY, TAB


   2/21/18





Allergies


Allergies:  


Coded Allergies:  


     No Known Allergies (Verified  Allergy, Unknown, 8/1/18)





PMhx/Soc


History of Surgery:  Yes (renal and pancreas transplant, eye sx, hernia sx)


Anesthesia Reaction:  No


Hx Neurological Disorder:  No


Hx Respiratory Disorders:  No


Hx Cardiac Disorders:  Yes (afib,htn)


Hx Psychiatric Problems:  Yes (anxiety)


Hx Miscellaneous Medical Probl:  No


Hx Alcohol Use:  Yes (1 glass of wine daily)


Hx Substance Use:  Yes (medical marijuana)


Hx Tobacco Use:  No


Smoking Status:  Never smoker





FmHx


Family History:  No diabetes, No coronary disease, No other





Physical Exam


Vitals





Vital Signs


  Date      Temp  Pulse  Resp  B/P (MAP)   Pulse Ox  O2          O2 Flow    FiO2


Time                                                 Delivery    Rate


   4/23/19  99.0     90    18      101/65        99  Room Air


     15:54                           (77)


   4/23/19  96.0    115    20      109/59        98


     11:16                           (76)





Physical Exam


Const:   No acute distress


Head:   Atraumatic 


Eyes:    Normal Conjunctiva


ENT:    Normal External Ears, Nose and Mouth.  Dry lips.


Neck:               Full range of motion. No meningismus.


Resp:   Clear to auscultation bilaterally


Cardio:   Regular rate and rhythm, no murmurs


Abd:    Soft, non tender, non distended. Normal bowel sounds


Skin:   No petechiae or rashes


Back:   No midline or flank tenderness


Ext:    No cyanosis, or edema


Neur:   Awake and alert


Psych:    Normal Mood and Affect


Result Diagram:  


4/23/19 1230                                                                    


           4/23/19 1230





Results 24 hrs





Laboratory Tests


              Test
                                 4/23/19
12:30


              White Blood Count                     16.4 10^3/ul


              Red Blood Count                       5.23 10^6/ul


              Hemoglobin                               16.0 g/dl


              Hematocrit                                  48.0 %


              Mean Corpuscular Volume                    91.8 fl


              Mean Corpuscular Hemoglobin                30.6 pg


              Mean Corpuscular Hemoglobin
Concent     33.3 g/dl 



              Red Cell Distribution Width                 14.0 %


              Platelet Count                         294 10^3/UL


              Mean Platelet Volume                       10.3 fl


              Immature Granulocytes %                    0.700 %


              Neutrophils %                               76.8 %


              Lymphocytes %                               14.8 %


              Monocytes %                                  7.1 %


              Eosinophils %                                0.2 %


              Basophils %                                  0.4 %


              Nucleated Red Blood Cells %            0.0 /100WBC


              Immature Granulocytes #              0.110 10^3/ul


              Neutrophils #                         12.6 10^3/ul


              Lymphocytes #                          2.4 10^3/ul


              Monocytes #                            1.2 10^3/ul


              Eosinophils #                          0.0 10^3/ul


              Basophils #                            0.1 10^3/ul


              Nucleated Red Blood Cells #            0.0 10^3/ul


              Sodium Level                            142 mmol/L


              Potassium Level                         3.8 mmol/L


              Chloride Level                          102 mmol/L


              Carbon Dioxide Level                     26 mmol/L


              Anion Gap                                       14


              Blood Urea Nitrogen                       21 mg/dl


              Creatinine                              3.12 mg/dl


              Est Glomerular Filtrat Rate
mL/min      21 mL/min 



              Glucose Level                            122 mg/dl


              Calcium Level                           10.9 mg/dl


              Total Bilirubin                          0.5 mg/dl


              Direct Bilirubin                        0.00 mg/dl


              Indirect Bilirubin                       0.5 mg/dl


              Aspartate Amino Transf
(AST/SGOT)         17 IU/L 



              Alanine Aminotransferase
(ALT/SGPT)       30 IU/L 



              Alkaline Phosphatase                       77 IU/L


              Total Protein                             7.9 g/dl


              Albumin                                   4.7 g/dl


              Globulin                                 3.20 g/dl


              Albumin/Globulin Ratio                        1.46


              Lipase                                      25 U/L





Current Medications


 Medications
   Dose
          Sig/Emily
       Start Time
   Status  Last


 (Trade)       Ordered        Route
 PRN     Stop Time              Admin
Dose


                              Reason                                Admin


 Sodium         1,000 ml @ 
   Q1H STAT
      4/23/19       DC           4/23/19


Chloride       1,000 mls/hr   IV
            12:19
                       12:51



                                             4/23/19 13:18


                10 mg          ONCE  STAT
    4/23/19       DC           4/23/19


Metoclopramid                 IV
            12:19
                       12:50



e HCl
                                       4/23/19 12:20


(Reglan)


 Famotidine
    20 mg          ONCE  ONCE
    4/23/19       DC           4/23/19


(Pepcid Iv)                   IV
            12:30
                       12:50



                                             4/23/19 12:31


 Lorazepam
     1 mg           ONCE  ONCE
    4/23/19       DC           4/23/19


(Ativan)                      IV
            12:30
                       12:50



                                             4/23/19 12:31


 Sodium         1,000 ml @ 
   Q0M ONCE
      4/23/19       DC       



Chloride       0 mls/hr       IV
            13:44



                                             4/23/19 13:48


 Sodium         1,500 ml @ 
   Q0M ONCE
      4/23/19       DC           4/23/19


Chloride       0 mls/hr       IV
            13:47
                       13:53



                                             4/23/19 13:53








Procedures/MDM


Patient presents with acute vomiting with a history of cyclical vomiting syndrom


e.  Patient was given 30 cc/kg normal saline IV bolus, Reglan 10 mg IV and 


Ativan 1 mg IV.  Patient had resolution of vomiting and felt much better.  


Patient CMP shows elevated BUN/creatinine at 21 and 3.12.  Patient has a history


of acute kidney injury which usually resolves with IV fluids.  Results and 


possibility of admission were discussed with patient.  Patient does not want to 


be admitted to the hospital.  Patient has mild leukocytosis but a benign abdomen


on serial exam suggesting stress response given patient's history.  Patient is 


well-appearing after observation treatment.  Patient was treated with a short 


course of Xanax and Reglan as this is his usual treatment.  Is advised to fo


llow-up with primary doctor, drink plenty fluids and return for recurrent 


vomiting, abdominal pain, fevers, new worsening symptoms.  The patient was 


stable with no new complaints during the ER course. Clinically, there is no 


current evidence to suggest meningitis, sepsis, acute abdomen, pneumonia, s


troke,  acute coronary syndrome, pulmonary embolism, aortic dissection or any 


other emergent condition appearing to require further evaluation or 


hospitalization.  Patient counseled regarding my diagnostic impression and care 


plan. Prior to discharge all questions answered. Pt agrees with treatment plan 


and understands strict return precautions. Pt is instructed to follow up with 


primary care provider within 24-48 hours. Precautionary instructions provided 


including instructions to return to the ER if not improving or for any worsening


or changing symptoms or concerns.





EKG: 


Rate/Rhythm:       Normal Sinus Rhythm.  Rate equals 87


QRS, ST, T-waves:    No changes consistent w/ acute ischemia


Impression:      No evidence of ischemia or arrhythmia.  No evidence of atrial 


fibrillation.





Departure


Diagnosis:  


   Primary Impression:  


   Vomiting


   Vomiting type:  unspecified  Vomiting Intractability:  unspecified  Nausea 


   presence:  unspecified  Qualified Codes:  R11.10 - Vomiting, unspecified


   Additional Impressions:  


   Renal insufficiency


   Fall with no significant injury


   Encounter type:  initial encounter  Qualified Codes:  W19.XXXA - Unspecified 


   fall, initial encounter


Condition:  Stable


Patient Instructions:  Vomiting (6Y-Adult), Renal Insufficiency, Refusal Of 


Further Treatment





Additional Instructions:  


Drink plenty of fluids at home.  Recheck for recurrent vomiting, fevers, 


abdominal pain, new worsening symptoms.











PERLA OJEDA MD             Apr 23, 2019 14:38

## 2019-07-14 ENCOUNTER — HOSPITAL ENCOUNTER (INPATIENT)
Dept: HOSPITAL 12 - ER | Age: 54
LOS: 6 days | Discharge: HOME HEALTH | DRG: 872 | End: 2019-07-20
Payer: MEDICARE

## 2019-07-14 VITALS — HEIGHT: 70 IN | BODY MASS INDEX: 20.76 KG/M2 | WEIGHT: 145 LBS

## 2019-07-14 VITALS — DIASTOLIC BLOOD PRESSURE: 65 MMHG | SYSTOLIC BLOOD PRESSURE: 96 MMHG

## 2019-07-14 VITALS — SYSTOLIC BLOOD PRESSURE: 88 MMHG | DIASTOLIC BLOOD PRESSURE: 56 MMHG

## 2019-07-14 VITALS — DIASTOLIC BLOOD PRESSURE: 63 MMHG | SYSTOLIC BLOOD PRESSURE: 114 MMHG

## 2019-07-14 VITALS — DIASTOLIC BLOOD PRESSURE: 54 MMHG | SYSTOLIC BLOOD PRESSURE: 95 MMHG

## 2019-07-14 DIAGNOSIS — E86.0: ICD-10-CM

## 2019-07-14 DIAGNOSIS — R65.20: ICD-10-CM

## 2019-07-14 DIAGNOSIS — I48.0: ICD-10-CM

## 2019-07-14 DIAGNOSIS — Z94.83: ICD-10-CM

## 2019-07-14 DIAGNOSIS — A40.9: Primary | ICD-10-CM

## 2019-07-14 DIAGNOSIS — I48.91: ICD-10-CM

## 2019-07-14 DIAGNOSIS — E83.42: ICD-10-CM

## 2019-07-14 DIAGNOSIS — E87.6: ICD-10-CM

## 2019-07-14 DIAGNOSIS — E83.39: ICD-10-CM

## 2019-07-14 DIAGNOSIS — E10.9: ICD-10-CM

## 2019-07-14 DIAGNOSIS — T86.12: ICD-10-CM

## 2019-07-14 DIAGNOSIS — E87.4: ICD-10-CM

## 2019-07-14 DIAGNOSIS — F32.9: ICD-10-CM

## 2019-07-14 DIAGNOSIS — N18.9: ICD-10-CM

## 2019-07-14 DIAGNOSIS — A04.9: ICD-10-CM

## 2019-07-14 DIAGNOSIS — E10.22: ICD-10-CM

## 2019-07-14 DIAGNOSIS — Z79.4: ICD-10-CM

## 2019-07-14 DIAGNOSIS — G43.A0: ICD-10-CM

## 2019-07-14 DIAGNOSIS — K57.90: ICD-10-CM

## 2019-07-14 DIAGNOSIS — F41.1: ICD-10-CM

## 2019-07-14 DIAGNOSIS — R09.02: ICD-10-CM

## 2019-07-14 LAB
ALP SERPL-CCNC: 44 U/L (ref 50–136)
ALT SERPL W/O P-5'-P-CCNC: 15 U/L (ref 16–63)
APPEARANCE UR: CLEAR
AST SERPL-CCNC: 12 U/L (ref 15–37)
BASOPHILS # BLD AUTO: 0 K/UL (ref 0–8)
BASOPHILS NFR BLD AUTO: 0.3 % (ref 0–2)
BILIRUB DIRECT SERPL-MCNC: 0.3 MG/DL (ref 0–0.2)
BILIRUB SERPL-MCNC: 1 MG/DL (ref 0.2–1)
BILIRUB UR QL STRIP: (no result)
BUN SERPL-MCNC: 24 MG/DL (ref 7–18)
CHLORIDE SERPL-SCNC: 104 MMOL/L (ref 98–107)
CO2 SERPL-SCNC: 18 MMOL/L (ref 21–32)
COLOR UR: YELLOW
CREAT SERPL-MCNC: 2.3 MG/DL (ref 0.6–1.3)
DEPRECATED SQUAMOUS URNS QL MICRO: (no result) /HPF
EOSINOPHIL # BLD AUTO: 0 K/UL (ref 0–0.7)
EOSINOPHIL NFR BLD AUTO: 0.2 % (ref 0–7)
GLUCOSE SERPL-MCNC: 131 MG/DL (ref 74–106)
GLUCOSE UR STRIP-MCNC: NEGATIVE MG/DL
HCT VFR BLD AUTO: 42.9 % (ref 36.7–47.1)
HGB BLD-MCNC: 14.4 G/DL (ref 12.5–16.3)
HGB UR QL STRIP: NEGATIVE
KETONES UR STRIP-MCNC: (no result) MG/DL
LEUKOCYTE ESTERASE UR QL STRIP: NEGATIVE
LIPASE SERPL-CCNC: 82 U/L (ref 73–393)
LYMPHOCYTES # BLD AUTO: 2.7 K/UL (ref 20–40)
LYMPHOCYTES NFR BLD AUTO: 18.5 % (ref 20.5–51.5)
MCH RBC QN AUTO: 31.7 UUG (ref 23.8–33.4)
MCHC RBC AUTO-ENTMCNC: 34 G/DL (ref 32.5–36.3)
MCV RBC AUTO: 94.7 FL (ref 73–96.2)
MONOCYTES # BLD AUTO: 1.4 K/UL (ref 2–10)
MONOCYTES NFR BLD AUTO: 9.7 % (ref 0–11)
NEUTROPHILS # BLD AUTO: 10.5 K/UL (ref 1.8–8.9)
NEUTROPHILS NFR BLD AUTO: 71.3 % (ref 38.5–71.5)
NITRITE UR QL STRIP: NEGATIVE
PH UR STRIP: 5.5 [PH] (ref 5–8)
PLATELET # BLD AUTO: 256 K/UL (ref 152–348)
POTASSIUM SERPL-SCNC: 3.3 MMOL/L (ref 3.5–5.1)
RBC # BLD AUTO: 4.53 MIL/UL (ref 4.06–5.63)
RBC #/AREA URNS HPF: (no result) /HPF (ref 0–3)
SP GR UR STRIP: 1.02 (ref 1–1.03)
UROBILINOGEN UR STRIP-MCNC: 1 E.U./DL
WBC # BLD AUTO: 14.7 K/UL (ref 3.6–10.2)
WBC #/AREA URNS HPF: (no result) /HPF
WBC #/AREA URNS HPF: (no result) /HPF (ref 0–3)
WS STN SPEC: 6.9 G/DL (ref 6.4–8.2)

## 2019-07-14 PROCEDURE — C1758 CATHETER, URETERAL: HCPCS

## 2019-07-14 PROCEDURE — A4663 DIALYSIS BLOOD PRESSURE CUFF: HCPCS

## 2019-07-14 PROCEDURE — C9113 INJ PANTOPRAZOLE SODIUM, VIA: HCPCS

## 2019-07-14 PROCEDURE — G0378 HOSPITAL OBSERVATION PER HR: HCPCS

## 2019-07-14 RX ADMIN — LORAZEPAM PRN MG: 2 INJECTION INTRAMUSCULAR; INTRAVENOUS at 19:40

## 2019-07-14 RX ADMIN — LORAZEPAM PRN MG: 2 INJECTION INTRAMUSCULAR; INTRAVENOUS at 10:08

## 2019-07-14 RX ADMIN — METRONIDAZOLE SCH MLS/HR: 500 SOLUTION INTRAVENOUS at 09:46

## 2019-07-14 RX ADMIN — SODIUM CHLORIDE PRN MLS/HR: 9 INJECTION, SOLUTION INTRAVENOUS at 21:03

## 2019-07-14 RX ADMIN — METRONIDAZOLE SCH MLS/HR: 500 SOLUTION INTRAVENOUS at 15:00

## 2019-07-14 RX ADMIN — DEXTROSE AND SODIUM CHLORIDE PRN MLS/HR: 5; .9 INJECTION, SOLUTION INTRAVENOUS at 09:47

## 2019-07-14 RX ADMIN — DEXTROSE AND SODIUM CHLORIDE PRN MLS/HR: 5; .9 INJECTION, SOLUTION INTRAVENOUS at 22:59

## 2019-07-14 RX ADMIN — Medication SCH MG: at 20:39

## 2019-07-14 RX ADMIN — ALPRAZOLAM PRN MG: 0.5 TABLET ORAL at 23:03

## 2019-07-14 RX ADMIN — DEXTROSE SCH MG: 50 INJECTION, SOLUTION INTRAVENOUS at 09:49

## 2019-07-14 RX ADMIN — METRONIDAZOLE SCH MLS/HR: 500 SOLUTION INTRAVENOUS at 21:49

## 2019-07-14 RX ADMIN — Medication SCH MG: at 10:36

## 2019-07-14 RX ADMIN — ALPRAZOLAM PRN MG: 0.5 TABLET ORAL at 15:03

## 2019-07-14 RX ADMIN — SODIUM CHLORIDE PRN MLS/HR: 9 INJECTION, SOLUTION INTRAVENOUS at 12:58

## 2019-07-14 NOTE — NUR
admitted from home a 55 yo male with adm dx of rapid afib, renal disease leukocytosis, 
dehydration, awake alert and oriented x3, seen by dr weston with orders. admission 
assessment initiated.

## 2019-07-14 NOTE — NUR
Pt. admitted to Telemetry, under care of Dr. Arguello.

Diagnosis - Dehydration, Intractable Abdominal Pain.

Belongs List completed

## 2019-07-14 NOTE — NUR
Pt states he takes about a dozen medications, unable to provide names and 
dosages at this time, only able to remember metoprolol.

## 2019-07-14 NOTE — NUR
RESTING COMFORTABLY, STARTED ON FLAGYL AND LEVAQUIN NO ADVERSE OR ALLERGY REACTION NOTED. 
CONTINUE WITH IVF  MLS/HR. STARTED ON 3L O2 NC DUE C/O SOB. SATURATING 75-97%. REMAINS 
AFIB UN CONTROLLED ON MONITOR RATE -132. LOPRESSOR PO STARTED. OBSERVED

## 2019-07-15 VITALS — SYSTOLIC BLOOD PRESSURE: 117 MMHG | DIASTOLIC BLOOD PRESSURE: 79 MMHG

## 2019-07-15 VITALS — DIASTOLIC BLOOD PRESSURE: 82 MMHG | SYSTOLIC BLOOD PRESSURE: 110 MMHG

## 2019-07-15 VITALS — SYSTOLIC BLOOD PRESSURE: 102 MMHG | DIASTOLIC BLOOD PRESSURE: 64 MMHG

## 2019-07-15 VITALS — SYSTOLIC BLOOD PRESSURE: 107 MMHG | DIASTOLIC BLOOD PRESSURE: 73 MMHG

## 2019-07-15 LAB
BASOPHILS # BLD AUTO: 0.1 K/UL (ref 0–8)
BASOPHILS NFR BLD AUTO: 0.4 % (ref 0–2)
BUN SERPL-MCNC: 17 MG/DL (ref 7–18)
CHLORIDE SERPL-SCNC: 109 MMOL/L (ref 98–107)
CO2 SERPL-SCNC: 20 MMOL/L (ref 21–32)
CREAT SERPL-MCNC: 2.3 MG/DL (ref 0.6–1.3)
EOSINOPHIL # BLD AUTO: 0 K/UL (ref 0–0.7)
EOSINOPHIL NFR BLD AUTO: 0 % (ref 0–7)
GLUCOSE SERPL-MCNC: 113 MG/DL (ref 74–106)
HCT VFR BLD AUTO: 42.4 % (ref 36.7–47.1)
HGB BLD-MCNC: 14.1 G/DL (ref 12.5–16.3)
LYMPHOCYTES # BLD AUTO: 2.5 K/UL (ref 20–40)
LYMPHOCYTES NFR BLD AUTO: 17.8 % (ref 20.5–51.5)
MAGNESIUM SERPL-MCNC: 1.2 MG/DL (ref 1.8–2.4)
MCH RBC QN AUTO: 31.4 UUG (ref 23.8–33.4)
MCHC RBC AUTO-ENTMCNC: 33 G/DL (ref 32.5–36.3)
MCV RBC AUTO: 94.5 FL (ref 73–96.2)
MONOCYTES # BLD AUTO: 1.3 K/UL (ref 2–10)
MONOCYTES NFR BLD AUTO: 9.2 % (ref 0–11)
NEUTROPHILS # BLD AUTO: 10.1 K/UL (ref 1.8–8.9)
NEUTROPHILS NFR BLD AUTO: 72.6 % (ref 38.5–71.5)
PHOSPHATE SERPL-MCNC: 1.3 MG/DL (ref 2.5–4.9)
PLATELET # BLD AUTO: 230 K/UL (ref 152–348)
POTASSIUM SERPL-SCNC: 4.1 MMOL/L (ref 3.5–5.1)
RBC # BLD AUTO: 4.49 MIL/UL (ref 4.06–5.63)
WBC # BLD AUTO: 14 K/UL (ref 3.6–10.2)

## 2019-07-15 PROCEDURE — 05HA33Z INSERTION OF INFUSION DEVICE INTO LEFT BRACHIAL VEIN, PERCUTANEOUS APPROACH: ICD-10-PCS

## 2019-07-15 RX ADMIN — SODIUM CHLORIDE PRN MLS/HR: 9 INJECTION, SOLUTION INTRAVENOUS at 05:20

## 2019-07-15 RX ADMIN — Medication PRN MG: at 23:23

## 2019-07-15 RX ADMIN — Medication SCH MG: at 08:01

## 2019-07-15 RX ADMIN — ASPIRIN SCH MG: 81 TABLET, COATED ORAL at 15:13

## 2019-07-15 RX ADMIN — DILTIAZEM HYDROCHLORIDE SCH MG: 120 CAPSULE, EXTENDED RELEASE ORAL at 14:33

## 2019-07-15 RX ADMIN — ASPIRIN SCH MG: 81 TABLET, COATED ORAL at 16:38

## 2019-07-15 RX ADMIN — DEXTROSE AND SODIUM CHLORIDE PRN MLS/HR: 5; .9 INJECTION, SOLUTION INTRAVENOUS at 10:01

## 2019-07-15 RX ADMIN — LORAZEPAM PRN MG: 2 INJECTION INTRAMUSCULAR; INTRAVENOUS at 20:17

## 2019-07-15 RX ADMIN — LORAZEPAM PRN MG: 2 INJECTION INTRAMUSCULAR; INTRAVENOUS at 01:55

## 2019-07-15 RX ADMIN — ALPRAZOLAM PRN MG: 0.5 TABLET ORAL at 07:56

## 2019-07-15 RX ADMIN — TACROLIMUS SCH MG: 0.5 CAPSULE, GELATIN COATED ORAL at 16:16

## 2019-07-15 RX ADMIN — ALPRAZOLAM PRN MG: 0.5 TABLET ORAL at 16:35

## 2019-07-15 RX ADMIN — Medication SCH MG: at 21:03

## 2019-07-15 RX ADMIN — DILTIAZEM HYDROCHLORIDE SCH MG: 120 CAPSULE, EXTENDED RELEASE ORAL at 21:02

## 2019-07-15 RX ADMIN — PANTOPRAZOLE SODIUM SCH MG: 40 TABLET, DELAYED RELEASE ORAL at 15:13

## 2019-07-15 RX ADMIN — LORAZEPAM PRN MG: 2 INJECTION INTRAMUSCULAR; INTRAVENOUS at 11:17

## 2019-07-15 RX ADMIN — Medication SCH EACH: at 16:31

## 2019-07-15 RX ADMIN — MAGNESIUM SULFATE IN DEXTROSE SCH MLS/HR: 10 INJECTION, SOLUTION INTRAVENOUS at 13:04

## 2019-07-15 RX ADMIN — TAZOBACTAM SODIUM AND PIPERACILLIN SODIUM SCH MLS/HR: 375; 3 INJECTION, SOLUTION INTRAVENOUS at 20:27

## 2019-07-15 RX ADMIN — ASPIRIN SCH MG: 81 TABLET, COATED ORAL at 14:15

## 2019-07-15 RX ADMIN — MAGNESIUM SULFATE IN DEXTROSE SCH MLS/HR: 10 INJECTION, SOLUTION INTRAVENOUS at 12:02

## 2019-07-15 RX ADMIN — DEXTROSE SCH MG: 50 INJECTION, SOLUTION INTRAVENOUS at 08:00

## 2019-07-15 RX ADMIN — TAZOBACTAM SODIUM AND PIPERACILLIN SODIUM SCH MLS/HR: 375; 3 INJECTION, SOLUTION INTRAVENOUS at 14:58

## 2019-07-15 RX ADMIN — PANTOPRAZOLE SODIUM SCH MG: 40 TABLET, DELAYED RELEASE ORAL at 16:38

## 2019-07-15 RX ADMIN — SODIUM CHLORIDE PRN MLS/HR: 9 INJECTION, SOLUTION INTRAVENOUS at 20:36

## 2019-07-15 NOTE — NUR
GOT  ORDER FOR SODIUM PHOSPHATE  AND ZOSYN FOR PATIENT , CALLED PHARMACY  AND STATED THAT 
THE TWO MEDICATIONS ARE COMPATIBLE.

## 2019-07-15 NOTE — NUR
Patient is now resting comfortably. PRN Zofran IVPB given and seems to be effective for now. 
PRN anti-anxiety medication also given and tolerated well. Patient is A-fib controlled in 
the 80-90s on TELE monitor. Will continue to monitor.

## 2019-07-15 NOTE — NUR
Patient slept intermittently throughout the night. When patient was awake, he would feel 
nauseous and try to vomit. Patient would vomit clear liquids, only after drinking water. PRN 
anti-anxiety medication would help and PRN zofran for only a short time. IVF running on the 
right forearm. Safety measures given. Will endorse to next shift.

## 2019-07-15 NOTE — NUR
Around 3:50 lab endorsed blood culture result of gram positive cocci in chains. Relayed to 
on-call Dr. Arguello with new orders. Will continue to monitor.

## 2019-07-15 NOTE — NUR
RECEIVED PATIENT  ALERT AND ORIENTED  WITH NO  SOB NOTED AT THIS  TIME .  WILL PROVIDE 
SAFETY  AND COMFORT AT ALL TIMES. CALL LIGHT  WITHIN REACHED. WILL CONTINUE TO MONITOR  AND 
CONTINUE TREATMENT PLAN.

## 2019-07-15 NOTE — NUR
PATIENT ALERT AND ORIENTED X4 AND RESTING IN BED  WITH NO  SOB NOTED AT THIS  TIME , NO C/O 
PAIN AT THIS TIME.  WILL PROVIDE SAFETY  AND COMFORT AT ALL TIMES. CALL LIGHT  WITHIN 
REACHED. WILL CONTINUE TO MONITOR  AND CONTINUE TREATMENT PLAN.

## 2019-07-15 NOTE — NUR
CLINICAL PHARMACY NOTE: VANCOMYCIN PHARMACY TO DOSE



Subjective: to start vancomycin in this 55 y/o male for indication of bloodstream 
infection/early sepsis



Objective:

weight 65kg

height 177cm



BUN/Scr 17/2.3 WBC 14  temp 97.7

1gm vanco given 7/14 @0701

Random today with am labs: 8.5



Assessment/Plan

As renal function is compromised, will dose per level for now. Dosed 1gm vanco at 1000 per 
today's random level. Next random ordered for tomorrow am. Will check when resulted and 
re-dose as appropriate. Will follow

## 2019-07-15 NOTE — NUR
Received patient in room, vomiting clear liquids. Will administer PRN Zofran IVPB. No signs 
of acute distress. No complaints of pain just nausea. Complaining of SOB and requesting to 
use mask. IV on the right forearm and midline LIZET are intact and patent. Patient able to 
make needs known. Safety measures initiated. Bed is low and locked, call light within reach. 
Will continue to monitor.

## 2019-07-16 VITALS — DIASTOLIC BLOOD PRESSURE: 89 MMHG | SYSTOLIC BLOOD PRESSURE: 123 MMHG

## 2019-07-16 VITALS — DIASTOLIC BLOOD PRESSURE: 61 MMHG | SYSTOLIC BLOOD PRESSURE: 111 MMHG

## 2019-07-16 VITALS — DIASTOLIC BLOOD PRESSURE: 72 MMHG | SYSTOLIC BLOOD PRESSURE: 115 MMHG

## 2019-07-16 VITALS — DIASTOLIC BLOOD PRESSURE: 82 MMHG | SYSTOLIC BLOOD PRESSURE: 116 MMHG

## 2019-07-16 VITALS — SYSTOLIC BLOOD PRESSURE: 111 MMHG | DIASTOLIC BLOOD PRESSURE: 77 MMHG

## 2019-07-16 LAB
BASOPHILS # BLD AUTO: 0 K/UL (ref 0–8)
BASOPHILS NFR BLD AUTO: 0.4 % (ref 0–2)
BUN SERPL-MCNC: 13 MG/DL (ref 7–18)
CHLORIDE SERPL-SCNC: 109 MMOL/L (ref 98–107)
CO2 SERPL-SCNC: 21 MMOL/L (ref 21–32)
CREAT SERPL-MCNC: 2 MG/DL (ref 0.6–1.3)
CREAT UR-MCNC: 31.2 MG/DL (ref 30–125)
EOSINOPHIL # BLD AUTO: 0 K/UL (ref 0–0.7)
EOSINOPHIL NFR BLD AUTO: 0.1 % (ref 0–7)
GLUCOSE SERPL-MCNC: 129 MG/DL (ref 74–106)
HCT VFR BLD AUTO: 41.9 % (ref 36.7–47.1)
HGB BLD-MCNC: 14 G/DL (ref 12.5–16.3)
LYMPHOCYTES # BLD AUTO: 2 K/UL (ref 20–40)
LYMPHOCYTES NFR BLD AUTO: 17.3 % (ref 20.5–51.5)
MAGNESIUM SERPL-MCNC: 1.5 MG/DL (ref 1.8–2.4)
MCH RBC QN AUTO: 31.6 UUG (ref 23.8–33.4)
MCHC RBC AUTO-ENTMCNC: 33 G/DL (ref 32.5–36.3)
MCV RBC AUTO: 94.4 FL (ref 73–96.2)
MONOCYTES # BLD AUTO: 1.1 K/UL (ref 2–10)
MONOCYTES NFR BLD AUTO: 10 % (ref 0–11)
NEUTROPHILS # BLD AUTO: 8.2 K/UL (ref 1.8–8.9)
NEUTROPHILS NFR BLD AUTO: 72.2 % (ref 38.5–71.5)
PHOSPHATE SERPL-MCNC: 3.5 MG/DL (ref 2.5–4.9)
PLATELET # BLD AUTO: 200 K/UL (ref 152–348)
POTASSIUM SERPL-SCNC: 3.2 MMOL/L (ref 3.5–5.1)
RBC # BLD AUTO: 4.44 MIL/UL (ref 4.06–5.63)
VANCOMYCIN SERPL-MCNC: 12.9 UG/ML (ref 18–26)
WBC # BLD AUTO: 11.4 K/UL (ref 3.6–10.2)

## 2019-07-16 RX ADMIN — TACROLIMUS SCH MG: 0.5 CAPSULE, GELATIN COATED ORAL at 17:08

## 2019-07-16 RX ADMIN — TAZOBACTAM SODIUM AND PIPERACILLIN SODIUM SCH MLS/HR: 375; 3 INJECTION, SOLUTION INTRAVENOUS at 02:29

## 2019-07-16 RX ADMIN — TAZOBACTAM SODIUM AND PIPERACILLIN SODIUM SCH MLS/HR: 375; 3 INJECTION, SOLUTION INTRAVENOUS at 08:53

## 2019-07-16 RX ADMIN — TAZOBACTAM SODIUM AND PIPERACILLIN SODIUM SCH MLS/HR: 375; 3 INJECTION, SOLUTION INTRAVENOUS at 20:29

## 2019-07-16 RX ADMIN — DEXTROSE AND SODIUM CHLORIDE PRN MLS/HR: 5; .9 INJECTION, SOLUTION INTRAVENOUS at 17:11

## 2019-07-16 RX ADMIN — Medication SCH MG: at 20:24

## 2019-07-16 RX ADMIN — ASPIRIN SCH MG: 81 TABLET, COATED ORAL at 17:07

## 2019-07-16 RX ADMIN — Medication SCH EACH: at 06:34

## 2019-07-16 RX ADMIN — LORAZEPAM PRN MG: 2 INJECTION INTRAMUSCULAR; INTRAVENOUS at 18:18

## 2019-07-16 RX ADMIN — Medication SCH EACH: at 17:21

## 2019-07-16 RX ADMIN — PANTOPRAZOLE SODIUM SCH MG: 40 TABLET, DELAYED RELEASE ORAL at 17:07

## 2019-07-16 RX ADMIN — Medication SCH MG: at 09:06

## 2019-07-16 RX ADMIN — DEXTROSE AND SODIUM CHLORIDE PRN MLS/HR: 5; .9 INJECTION, SOLUTION INTRAVENOUS at 06:08

## 2019-07-16 RX ADMIN — TAZOBACTAM SODIUM AND PIPERACILLIN SODIUM SCH MLS/HR: 375; 3 INJECTION, SOLUTION INTRAVENOUS at 15:04

## 2019-07-16 RX ADMIN — DILTIAZEM HYDROCHLORIDE SCH MG: 120 CAPSULE, EXTENDED RELEASE ORAL at 09:07

## 2019-07-16 RX ADMIN — ASPIRIN SCH MG: 81 TABLET, COATED ORAL at 08:56

## 2019-07-16 RX ADMIN — PANTOPRAZOLE SODIUM SCH MG: 40 TABLET, DELAYED RELEASE ORAL at 06:16

## 2019-07-16 RX ADMIN — ALPRAZOLAM PRN MG: 0.5 TABLET ORAL at 09:19

## 2019-07-16 RX ADMIN — Medication PRN MG: at 22:53

## 2019-07-16 RX ADMIN — ALPRAZOLAM PRN MG: 0.5 TABLET ORAL at 15:04

## 2019-07-16 RX ADMIN — DILTIAZEM HYDROCHLORIDE SCH MG: 120 CAPSULE, EXTENDED RELEASE ORAL at 20:23

## 2019-07-16 RX ADMIN — LORAZEPAM PRN MG: 2 INJECTION INTRAMUSCULAR; INTRAVENOUS at 11:19

## 2019-07-16 RX ADMIN — TACROLIMUS SCH MG: 0.5 CAPSULE, GELATIN COATED ORAL at 08:56

## 2019-07-16 NOTE — NUR
CLINICAL PHARMACY NOTE: VANCOMYCIN PHARMACY TO DOSE



Subjective: to continue vancomycin in this 55 y/o male for indication of bloodstream 
infection/early sepsis



Objective:

weight 65kg

height 177cm



BUN/Scr 13/2.0 WBC 14 (7/15)  temp 98.2

1gm vanco given 7/14 @0701

Random today with am labs: 12.9



Assessment/Plan

As renal function is compromised, will dose per level for now. Dosed 1gm vanco at 1000 per 
today's random level. Next random ordered for tomorrow am. Will check when resulted and 
re-dose as appropriate. Will follow

## 2019-07-16 NOTE — NUR
Patient slept well after receiving PRN Ambien. Patient is on 6L oxygen through mask for 
comfort. No signs of acute distress noted. Medication given as ordered and tolerated well. 
Safety measures given

## 2019-07-16 NOTE — NUR
Received patient in bed awake, A&Ox3. Not in distress at this time. No complaints of pain, 
no episodes of n/v at this time. LIZET midline intact and patent. IV access to R wrist 
infiltrated, removed w/ complete tip. Cold compress applied to site. Kept bed in low 
position, locked w/ side rails up x 2. Call light within reach. Will continue to monitor

## 2019-07-16 NOTE — NUR
Pt received, assessed, AOx3, able to make needs known. Pt denies pain, just nausea, no SOB 
on 6L mask. Pt compliant with routine medications and care as provided. Pt seen by 
Cardiologist and NP. New orders received. IV in right arm and LIZET midline flushed and patent 
running fluids and antibiotics. VSS. Stool sample collected and send to lab. All comfort and 
safety measures implemented. Call light and personal belongings placed within reach. Will 
continue to monitor and endorse to oncoming night shift

## 2019-07-17 VITALS — DIASTOLIC BLOOD PRESSURE: 72 MMHG | SYSTOLIC BLOOD PRESSURE: 105 MMHG

## 2019-07-17 VITALS — DIASTOLIC BLOOD PRESSURE: 65 MMHG | SYSTOLIC BLOOD PRESSURE: 104 MMHG

## 2019-07-17 VITALS — SYSTOLIC BLOOD PRESSURE: 100 MMHG | DIASTOLIC BLOOD PRESSURE: 64 MMHG

## 2019-07-17 VITALS — SYSTOLIC BLOOD PRESSURE: 113 MMHG | DIASTOLIC BLOOD PRESSURE: 65 MMHG

## 2019-07-17 LAB
BASE EXCESS BLDA CALC-SCNC: -2.2 MMOL/L
BUN SERPL-MCNC: 12 MG/DL (ref 7–18)
CHLORIDE SERPL-SCNC: 108 MMOL/L (ref 98–107)
CO2 SERPL-SCNC: 23 MMOL/L (ref 21–32)
CREAT SERPL-MCNC: 2.1 MG/DL (ref 0.6–1.3)
GLUCOSE SERPL-MCNC: 113 MG/DL (ref 74–106)
HCO3 BLDA-SCNC: 11.6 MMOL/L
HGB BLDA OXIMETRY-MCNC: 15.1 G/DL (ref 13.5–18)
INHALED O2 CONCENTRATION: 44 %
MAGNESIUM SERPL-MCNC: 1.5 MG/DL (ref 1.8–2.4)
PCO2 TEMP ADJ BLDA: 8.4 MMHG (ref 35–45)
PH TEMP ADJ BLDA: 7.76 [PH] (ref 7.35–7.45)
PO2 TEMP ADJ BLDA: 157.5 MMHG (ref 75–100)
POTASSIUM SERPL-SCNC: 3 MMOL/L (ref 3.5–5.1)
SPECIMEN DRAWN FROM PATIENT: (no result)
VANCOMYCIN SERPL-MCNC: 16.3 UG/ML (ref 18–26)

## 2019-07-17 RX ADMIN — DILTIAZEM HYDROCHLORIDE SCH MG: 120 CAPSULE, EXTENDED RELEASE ORAL at 09:27

## 2019-07-17 RX ADMIN — TACROLIMUS SCH MG: 0.5 CAPSULE, GELATIN COATED ORAL at 16:57

## 2019-07-17 RX ADMIN — DILTIAZEM HYDROCHLORIDE SCH MG: 120 CAPSULE, EXTENDED RELEASE ORAL at 20:48

## 2019-07-17 RX ADMIN — TAZOBACTAM SODIUM AND PIPERACILLIN SODIUM SCH MLS/HR: 375; 3 INJECTION, SOLUTION INTRAVENOUS at 09:42

## 2019-07-17 RX ADMIN — ASPIRIN SCH MG: 81 TABLET, COATED ORAL at 16:56

## 2019-07-17 RX ADMIN — DEXTROSE AND SODIUM CHLORIDE PRN MLS/HR: 5; .9 INJECTION, SOLUTION INTRAVENOUS at 22:43

## 2019-07-17 RX ADMIN — ASPIRIN SCH MG: 81 TABLET, COATED ORAL at 09:25

## 2019-07-17 RX ADMIN — Medication SCH EACH: at 16:57

## 2019-07-17 RX ADMIN — TACROLIMUS SCH MG: 0.5 CAPSULE, GELATIN COATED ORAL at 09:42

## 2019-07-17 RX ADMIN — TAZOBACTAM SODIUM AND PIPERACILLIN SODIUM SCH MLS/HR: 375; 3 INJECTION, SOLUTION INTRAVENOUS at 01:38

## 2019-07-17 RX ADMIN — DEXTROSE SCH MLS/HR: 50 INJECTION, SOLUTION INTRAVENOUS at 20:46

## 2019-07-17 RX ADMIN — TAZOBACTAM SODIUM AND PIPERACILLIN SODIUM SCH MLS/HR: 375; 3 INJECTION, SOLUTION INTRAVENOUS at 13:33

## 2019-07-17 RX ADMIN — DEXTROSE AND SODIUM CHLORIDE PRN MLS/HR: 5; .9 INJECTION, SOLUTION INTRAVENOUS at 05:56

## 2019-07-17 RX ADMIN — Medication SCH MG: at 09:27

## 2019-07-17 RX ADMIN — MAGNESIUM SULFATE IN DEXTROSE SCH MLS/HR: 10 INJECTION, SOLUTION INTRAVENOUS at 11:16

## 2019-07-17 RX ADMIN — Medication SCH EACH: at 09:31

## 2019-07-17 RX ADMIN — PANTOPRAZOLE SODIUM SCH MG: 40 TABLET, DELAYED RELEASE ORAL at 09:42

## 2019-07-17 RX ADMIN — ALPRAZOLAM PRN MG: 0.5 TABLET ORAL at 14:47

## 2019-07-17 RX ADMIN — PANTOPRAZOLE SODIUM SCH MG: 40 TABLET, DELAYED RELEASE ORAL at 16:56

## 2019-07-17 RX ADMIN — MAGNESIUM SULFATE IN DEXTROSE SCH MLS/HR: 10 INJECTION, SOLUTION INTRAVENOUS at 09:58

## 2019-07-17 RX ADMIN — Medication PRN MG: at 22:57

## 2019-07-17 RX ADMIN — Medication SCH MG: at 21:00

## 2019-07-17 NOTE — NUR
CLINICAL PHARMACY NOTE: VANCOMYCIN PHARMACY TO DOSE



Subjective: to continue vancomycin in this 53 y/o male for indication of bloodstream 
infection/early sepsis



Objective:

weight 65kg

height 177cm



BUN/Scr 12/2.1 WBC 14 (7/15)  temp 98.8

1gm vanco given 7/16 @1000

Random today with am labs: 16.3



Assessment/Plan

As renal function is compromised, will dose per level for now. Dosed 1gm vanco at 1000(given 
at 1145) per today's random level. Next random ordered for tomorrow at 1400. Will check when 
resulted and re-dose as appropriate. Will follow

## 2019-07-17 NOTE — NUR
PATIENT IS ALERT, ORIENTED X4, VERBALLY RESPONSIVE, NO SOB AT THIS TIME. RESP EVEN 
NONLABORED,SKIN WARM AND DRY TO TOUCH, TOLERATED MEALS AND MEDS WELL, DENIED ANY NAUSEA, NO 
VOMITING, NOTED WITH ANXIOUS MOOD, PATIENT REQUESTED XANAX, ADMINISTERED AS ORDERED, 
EFFECTIVE, PATIENT RESTED FOR 2 HOURS, STILL ON SUPPLEMENTAL OXYGEN VIA FACE MASK. NO ACUTE 
DISTRESS NOTED, NEEDS ATTENDED TIMELY, QUESTIONS ANSWERED APPROPRIATELY AND NEEDS ADDRESSED, 
WILL ENDORSE TO NIGHT SHIFT ACCORDINGLY

## 2019-07-17 NOTE — NUR
first time called at 11:04 am for abnormal lab results for ABGs, spoke to hyun from the 
office to page dr decker, spoke to hyun about abnormal ABGs of patient, waiting for 
call back

## 2019-07-17 NOTE — NUR
Patient took PRN Ambien and was able to sleep throughout the night. Patient has been 
compliant and cooperative w/ nursing care. Patient is for ultrasound of abdomen this AM, 
instructed to be NPO. All needs attended.

## 2019-07-17 NOTE — NUR
patient is alert, oriented x4, verbally responsive, noted with SOB, anxious, however o2 sat 
is 99% at 4 liter of oxygen via face mask, MD aware, charge nurse and supervisor is aware 
about patient current condition. patient kept NPO untill 9am, because of abdminal 
ultrasound, abdominal ultrasound performed, all the meds administered after abdominal 
ultrasound.

## 2019-07-17 NOTE — NUR
Pt. in room alert oriented x4, standing in room next to bed listening to music via phone. 
Face mask on 6 L of oxygen. Right Upper midline intact, patent, and running fluids. Safety 
measures in place. Will continue to monitor pt.

## 2019-07-18 VITALS — DIASTOLIC BLOOD PRESSURE: 80 MMHG | SYSTOLIC BLOOD PRESSURE: 120 MMHG

## 2019-07-18 VITALS — DIASTOLIC BLOOD PRESSURE: 71 MMHG | SYSTOLIC BLOOD PRESSURE: 110 MMHG

## 2019-07-18 VITALS — SYSTOLIC BLOOD PRESSURE: 111 MMHG | DIASTOLIC BLOOD PRESSURE: 73 MMHG

## 2019-07-18 VITALS — SYSTOLIC BLOOD PRESSURE: 111 MMHG | DIASTOLIC BLOOD PRESSURE: 69 MMHG

## 2019-07-18 LAB
ALP SERPL-CCNC: 40 U/L (ref 50–136)
ALT SERPL W/O P-5'-P-CCNC: 14 U/L (ref 16–63)
AST SERPL-CCNC: 9 U/L (ref 15–37)
BASOPHILS # BLD AUTO: 0 K/UL (ref 0–8)
BASOPHILS NFR BLD AUTO: 0.5 % (ref 0–2)
BILIRUB SERPL-MCNC: 0.8 MG/DL (ref 0.2–1)
BUN SERPL-MCNC: 11 MG/DL (ref 7–18)
CHLORIDE SERPL-SCNC: 108 MMOL/L (ref 98–107)
CO2 SERPL-SCNC: 26 MMOL/L (ref 21–32)
CREAT SERPL-MCNC: 1.7 MG/DL (ref 0.6–1.3)
EOSINOPHIL # BLD AUTO: 0.1 K/UL (ref 0–0.7)
EOSINOPHIL NFR BLD AUTO: 1.8 % (ref 0–7)
GLUCOSE SERPL-MCNC: 97 MG/DL (ref 74–106)
HCT VFR BLD AUTO: 43.1 % (ref 36.7–47.1)
HGB BLD-MCNC: 14.3 G/DL (ref 12.5–16.3)
LYMPHOCYTES # BLD AUTO: 2.4 K/UL (ref 20–40)
LYMPHOCYTES NFR BLD AUTO: 28.7 % (ref 20.5–51.5)
MAGNESIUM SERPL-MCNC: 1.7 MG/DL (ref 1.8–2.4)
MCH RBC QN AUTO: 31.6 UUG (ref 23.8–33.4)
MCHC RBC AUTO-ENTMCNC: 33 G/DL (ref 32.5–36.3)
MCV RBC AUTO: 94.9 FL (ref 73–96.2)
MONOCYTES # BLD AUTO: 0.8 K/UL (ref 2–10)
MONOCYTES NFR BLD AUTO: 10 % (ref 0–11)
NEUTROPHILS # BLD AUTO: 4.9 K/UL (ref 1.8–8.9)
NEUTROPHILS NFR BLD AUTO: 59 % (ref 38.5–71.5)
PHOSPHATE SERPL-MCNC: 3.1 MG/DL (ref 2.5–4.9)
PLATELET # BLD AUTO: 215 K/UL (ref 152–348)
POTASSIUM SERPL-SCNC: 3 MMOL/L (ref 3.5–5.1)
RBC # BLD AUTO: 4.54 MIL/UL (ref 4.06–5.63)
WBC # BLD AUTO: 8.4 K/UL (ref 3.6–10.2)
WS STN SPEC: 6.8 G/DL (ref 6.4–8.2)

## 2019-07-18 RX ADMIN — MAGNESIUM SULFATE IN DEXTROSE SCH MLS/HR: 10 INJECTION, SOLUTION INTRAVENOUS at 10:14

## 2019-07-18 RX ADMIN — DILTIAZEM HYDROCHLORIDE SCH MG: 120 CAPSULE, EXTENDED RELEASE ORAL at 22:05

## 2019-07-18 RX ADMIN — DEXTROSE AND SODIUM CHLORIDE PRN MLS/HR: 5; .9 INJECTION, SOLUTION INTRAVENOUS at 23:40

## 2019-07-18 RX ADMIN — LORAZEPAM PRN MG: 2 INJECTION INTRAMUSCULAR; INTRAVENOUS at 23:56

## 2019-07-18 RX ADMIN — DEXTROSE SCH MLS/HR: 50 INJECTION, SOLUTION INTRAVENOUS at 19:29

## 2019-07-18 RX ADMIN — Medication SCH EACH: at 17:11

## 2019-07-18 RX ADMIN — PANTOPRAZOLE SODIUM SCH MG: 40 TABLET, DELAYED RELEASE ORAL at 17:05

## 2019-07-18 RX ADMIN — DILTIAZEM HYDROCHLORIDE SCH MG: 120 CAPSULE, EXTENDED RELEASE ORAL at 08:57

## 2019-07-18 RX ADMIN — Medication SCH MG: at 20:20

## 2019-07-18 RX ADMIN — TACROLIMUS SCH MG: 0.5 CAPSULE, GELATIN COATED ORAL at 08:56

## 2019-07-18 RX ADMIN — PANTOPRAZOLE SODIUM SCH MG: 40 TABLET, DELAYED RELEASE ORAL at 06:36

## 2019-07-18 RX ADMIN — DEXTROSE AND SODIUM CHLORIDE PRN MLS/HR: 5; .9 INJECTION, SOLUTION INTRAVENOUS at 10:17

## 2019-07-18 RX ADMIN — ASPIRIN SCH MG: 81 TABLET, COATED ORAL at 08:57

## 2019-07-18 RX ADMIN — Medication SCH EACH: at 08:14

## 2019-07-18 RX ADMIN — ASPIRIN SCH MG: 81 TABLET, COATED ORAL at 17:05

## 2019-07-18 RX ADMIN — MAGNESIUM SULFATE IN DEXTROSE SCH MLS/HR: 10 INJECTION, SOLUTION INTRAVENOUS at 08:13

## 2019-07-18 RX ADMIN — Medication SCH MG: at 08:58

## 2019-07-18 RX ADMIN — TACROLIMUS SCH MG: 0.5 CAPSULE, GELATIN COATED ORAL at 17:05

## 2019-07-18 NOTE — NUR
CLINICAL PHARMACY NOTE: VANCOMYCIN PHARMACY TO DOSE



Subjective: to continue vancomycin in this 55 y/o male for indication of bloodstream 
infection/early sepsis



Objective:

weight 65kg

height 177cm



BUN/Scr 11/11.7 WBC 8.4  temp 98

Random today at 1400: 14.3



Assessment/Plan

As renal function is compromised, will dose per level for now. Since vanco random level is 
14.3, will give vanco 1gm IVPB x1 dose today at 1600. Next random ordered for tomorrow at 
1500. Will check when resulted and re-dose as appropriate. Will follow

## 2019-07-18 NOTE — NUR
Pt. standing at bedside with family member. Pt. is alert oriented x4. IV in R upper arm 
midline intact. Pt. denies any SOB or difficulty breathing. Pt. denies any pain. Safety 
measures in place. Will continue to monitor.

## 2019-07-18 NOTE — NUR
patient received ambulating on the bedside and tolerating well on room air. cardiologist in 
to see patient, with no complaionts made

## 2019-07-18 NOTE — NUR
Pt. resting in bed alert oriented x4. Pt. has R upper arm midline intact running fluids. Pt. 
denies any pain. All medications given. Safety measures in place. Call light within reach. 
Will endorse to AM nurse.

## 2019-07-19 VITALS — DIASTOLIC BLOOD PRESSURE: 78 MMHG | SYSTOLIC BLOOD PRESSURE: 125 MMHG

## 2019-07-19 VITALS — DIASTOLIC BLOOD PRESSURE: 85 MMHG | SYSTOLIC BLOOD PRESSURE: 133 MMHG

## 2019-07-19 VITALS — SYSTOLIC BLOOD PRESSURE: 121 MMHG | DIASTOLIC BLOOD PRESSURE: 82 MMHG

## 2019-07-19 LAB
BASOPHILS # BLD AUTO: 0.1 K/UL (ref 0–8)
BASOPHILS NFR BLD AUTO: 0.6 % (ref 0–2)
BUN SERPL-MCNC: 12 MG/DL (ref 7–18)
CHLORIDE SERPL-SCNC: 107 MMOL/L (ref 98–107)
CO2 SERPL-SCNC: 26 MMOL/L (ref 21–32)
CREAT SERPL-MCNC: 1.4 MG/DL (ref 0.6–1.3)
EOSINOPHIL # BLD AUTO: 0.2 K/UL (ref 0–0.7)
EOSINOPHIL NFR BLD AUTO: 2.3 % (ref 0–7)
GLUCOSE SERPL-MCNC: 121 MG/DL (ref 74–106)
HCT VFR BLD AUTO: 42 % (ref 36.7–47.1)
HGB BLD-MCNC: 13.7 G/DL (ref 12.5–16.3)
LYMPHOCYTES # BLD AUTO: 1.5 K/UL (ref 20–40)
LYMPHOCYTES NFR BLD AUTO: 16.4 % (ref 20.5–51.5)
MAGNESIUM SERPL-MCNC: 1.6 MG/DL (ref 1.8–2.4)
MCH RBC QN AUTO: 31.3 UUG (ref 23.8–33.4)
MCHC RBC AUTO-ENTMCNC: 33 G/DL (ref 32.5–36.3)
MCV RBC AUTO: 95.7 FL (ref 73–96.2)
MONOCYTES # BLD AUTO: 1 K/UL (ref 2–10)
MONOCYTES NFR BLD AUTO: 11.1 % (ref 0–11)
NEUTROPHILS # BLD AUTO: 6.2 K/UL (ref 1.8–8.9)
NEUTROPHILS NFR BLD AUTO: 69.6 % (ref 38.5–71.5)
PHOSPHATE SERPL-MCNC: 2.7 MG/DL (ref 2.5–4.9)
PLATELET # BLD AUTO: 186 K/UL (ref 152–348)
POTASSIUM SERPL-SCNC: 3.3 MMOL/L (ref 3.5–5.1)
RBC # BLD AUTO: 4.39 MIL/UL (ref 4.06–5.63)
WBC # BLD AUTO: 8.9 K/UL (ref 3.6–10.2)

## 2019-07-19 RX ADMIN — Medication SCH MG: at 20:34

## 2019-07-19 RX ADMIN — ALPRAZOLAM PRN MG: 0.5 TABLET ORAL at 22:07

## 2019-07-19 RX ADMIN — ASPIRIN SCH MG: 81 TABLET, COATED ORAL at 08:01

## 2019-07-19 RX ADMIN — Medication SCH EACH: at 17:46

## 2019-07-19 RX ADMIN — MAGNESIUM SULFATE IN DEXTROSE SCH MLS/HR: 10 INJECTION, SOLUTION INTRAVENOUS at 14:39

## 2019-07-19 RX ADMIN — DEXTROSE SCH MLS/HR: 50 INJECTION, SOLUTION INTRAVENOUS at 20:33

## 2019-07-19 RX ADMIN — DEXTROSE AND SODIUM CHLORIDE PRN MLS/HR: 5; .9 INJECTION, SOLUTION INTRAVENOUS at 10:02

## 2019-07-19 RX ADMIN — PANTOPRAZOLE SODIUM SCH MG: 40 TABLET, DELAYED RELEASE ORAL at 06:14

## 2019-07-19 RX ADMIN — PANTOPRAZOLE SODIUM SCH MG: 40 TABLET, DELAYED RELEASE ORAL at 17:43

## 2019-07-19 RX ADMIN — DILTIAZEM HYDROCHLORIDE SCH MG: 120 CAPSULE, EXTENDED RELEASE ORAL at 08:05

## 2019-07-19 RX ADMIN — Medication PRN MG: at 01:13

## 2019-07-19 RX ADMIN — MAGNESIUM SULFATE IN DEXTROSE SCH MLS/HR: 10 INJECTION, SOLUTION INTRAVENOUS at 16:00

## 2019-07-19 RX ADMIN — Medication SCH MG: at 08:05

## 2019-07-19 RX ADMIN — ASPIRIN SCH MG: 81 TABLET, COATED ORAL at 17:43

## 2019-07-19 RX ADMIN — Medication SCH EACH: at 08:07

## 2019-07-19 RX ADMIN — DILTIAZEM HYDROCHLORIDE SCH MG: 120 CAPSULE, EXTENDED RELEASE ORAL at 20:34

## 2019-07-19 RX ADMIN — TACROLIMUS SCH MG: 0.5 CAPSULE, GELATIN COATED ORAL at 08:03

## 2019-07-19 RX ADMIN — TACROLIMUS SCH MG: 0.5 CAPSULE, GELATIN COATED ORAL at 17:46

## 2019-07-19 NOTE — NUR
PATIENT ALERT ORIENTED, NO SOB NO CHEST PAIN NOTED. R UPPER ARM MIDLINE INTACT, NO COMPLAIN 
OF PAIN AT THIS TIME. CONT TO MONITOR.

## 2019-07-19 NOTE — NUR
Pt. resting in bed. Pt. is alert oriented x4. Pt. slept intermittently throughout night. IV 
in R upper arm midline intact. Pt. denies any SOB or difficulty breathing. Pt. denies any 
pain. PRN ambien given for sleep. Effective. PRN Ativan given for anxiety. effective. Safety 
measures in place. Will continue to monitor.

## 2019-07-19 NOTE — NUR
CLINICAL PHARMACY NOTE: VANCOMYCIN PHARMACY TO DOSE



Subjective: to continue vancomycin in this 55 y/o male for indication of bloodstream 
infection/early sepsis



Objective:

weight 65kg

height 177cm



BUN/Scr 12/1.4 WBC 8.9  temp 98.2

Random pending today at 1500



Assessment/Plan

As renal function is compromised, will dose per level for now. Last vanco 1gm IVPB x1 dose 
given yesterday at 1600. Next random pending today at 1500. Will check when resulted and 
re-dose as appropriate. Will follow

-------------------------------------------------------------------------------

Addendum: 07/19/19 at 1549 by NIA COX ADM

-------------------------------------------------------------------------------

UPDATE: RANDOM RESULTED 13.2, WILL DOSE 1GM VANCO X 1 FOR 1600. NEXT RANDOM TOMORROW WITH AM 
LABS. WILL CHECK AND REDOSE AS NEEDED. WILL FOLLOW

## 2019-07-20 VITALS — SYSTOLIC BLOOD PRESSURE: 121 MMHG | DIASTOLIC BLOOD PRESSURE: 82 MMHG

## 2019-07-20 VITALS — DIASTOLIC BLOOD PRESSURE: 74 MMHG | SYSTOLIC BLOOD PRESSURE: 115 MMHG

## 2019-07-20 VITALS — SYSTOLIC BLOOD PRESSURE: 123 MMHG | DIASTOLIC BLOOD PRESSURE: 85 MMHG

## 2019-07-20 LAB
BUN SERPL-MCNC: 12 MG/DL (ref 7–18)
CHLORIDE SERPL-SCNC: 109 MMOL/L (ref 98–107)
CO2 SERPL-SCNC: 23 MMOL/L (ref 21–32)
CREAT SERPL-MCNC: 1.3 MG/DL (ref 0.6–1.3)
GLUCOSE SERPL-MCNC: 112 MG/DL (ref 74–106)
MAGNESIUM SERPL-MCNC: 1.6 MG/DL (ref 1.8–2.4)
POTASSIUM SERPL-SCNC: 3.2 MMOL/L (ref 3.5–5.1)

## 2019-07-20 RX ADMIN — ASPIRIN SCH MG: 81 TABLET, COATED ORAL at 08:45

## 2019-07-20 RX ADMIN — Medication SCH EACH: at 16:30

## 2019-07-20 RX ADMIN — DILTIAZEM HYDROCHLORIDE SCH MG: 120 CAPSULE, EXTENDED RELEASE ORAL at 09:33

## 2019-07-20 RX ADMIN — TACROLIMUS SCH MG: 0.5 CAPSULE, GELATIN COATED ORAL at 08:44

## 2019-07-20 RX ADMIN — PANTOPRAZOLE SODIUM SCH MG: 40 TABLET, DELAYED RELEASE ORAL at 06:20

## 2019-07-20 RX ADMIN — DEXTROSE AND SODIUM CHLORIDE PRN MLS/HR: 5; .9 INJECTION, SOLUTION INTRAVENOUS at 10:54

## 2019-07-20 RX ADMIN — ASPIRIN SCH MG: 81 TABLET, COATED ORAL at 16:30

## 2019-07-20 RX ADMIN — Medication SCH EACH: at 06:21

## 2019-07-20 RX ADMIN — PANTOPRAZOLE SODIUM SCH MG: 40 TABLET, DELAYED RELEASE ORAL at 16:30

## 2019-07-20 RX ADMIN — LORAZEPAM PRN MG: 2 INJECTION INTRAMUSCULAR; INTRAVENOUS at 04:30

## 2019-07-20 RX ADMIN — Medication SCH MG: at 09:33

## 2019-07-20 RX ADMIN — TACROLIMUS SCH MG: 0.5 CAPSULE, GELATIN COATED ORAL at 16:30

## 2019-07-20 RX ADMIN — DEXTROSE AND SODIUM CHLORIDE PRN MLS/HR: 5; .9 INJECTION, SOLUTION INTRAVENOUS at 00:34

## 2019-07-20 NOTE — NUR
Finished evening dose of Rocephin IV via midline. Pt went home alone ride thru [employer], 
wheelchaired downstairs. Pt went home with midline on LIZET for continued use of IV Rocephin 
and HH RN visit. discharge instructions given regarding medications, activities, diet and 
follow up appointments. encouraged for questions, none raised.

## 2019-07-20 NOTE — NUR
Patient alert oriented, no sob no chest pain. Patient has episodes of anxiety, medicated for 
pain, cont to monitor. No complain of pain at this time cont to monitor.

## 2019-08-28 ENCOUNTER — HOSPITAL ENCOUNTER (INPATIENT)
Dept: HOSPITAL 10 - E/R | Age: 54
LOS: 3 days | Discharge: HOME | DRG: 371 | End: 2019-08-31
Attending: INTERNAL MEDICINE | Admitting: INTERNAL MEDICINE
Payer: MEDICARE

## 2019-08-28 ENCOUNTER — HOSPITAL ENCOUNTER (INPATIENT)
Dept: HOSPITAL 91 - E/R | Age: 54
LOS: 3 days | Discharge: HOME | DRG: 371 | End: 2019-08-31
Payer: MEDICARE

## 2019-08-28 VITALS
WEIGHT: 136.91 LBS | WEIGHT: 136.91 LBS | BODY MASS INDEX: 19.6 KG/M2 | HEIGHT: 70 IN | BODY MASS INDEX: 19.6 KG/M2 | HEIGHT: 70 IN

## 2019-08-28 VITALS — SYSTOLIC BLOOD PRESSURE: 153 MMHG | HEART RATE: 65 BPM | RESPIRATION RATE: 17 BRPM | DIASTOLIC BLOOD PRESSURE: 90 MMHG

## 2019-08-28 DIAGNOSIS — E83.42: ICD-10-CM

## 2019-08-28 DIAGNOSIS — F41.9: ICD-10-CM

## 2019-08-28 DIAGNOSIS — E10.42: ICD-10-CM

## 2019-08-28 DIAGNOSIS — I12.0: ICD-10-CM

## 2019-08-28 DIAGNOSIS — I48.91: ICD-10-CM

## 2019-08-28 DIAGNOSIS — Z79.82: ICD-10-CM

## 2019-08-28 DIAGNOSIS — N17.9: ICD-10-CM
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
## 2019-08-28 DIAGNOSIS — A04.72: Primary | ICD-10-CM

## 2019-08-28 DIAGNOSIS — E10.22: ICD-10-CM

## 2019-08-28 DIAGNOSIS — Z94.83: ICD-10-CM

## 2019-08-28 DIAGNOSIS — D89.811: ICD-10-CM

## 2019-08-28 DIAGNOSIS — N18.6: ICD-10-CM

## 2019-08-28 DIAGNOSIS — T86.12: ICD-10-CM

## 2019-08-28 DIAGNOSIS — K21.9: ICD-10-CM

## 2019-08-28 DIAGNOSIS — J44.9: ICD-10-CM

## 2019-08-28 DIAGNOSIS — E87.6: ICD-10-CM

## 2019-08-28 DIAGNOSIS — E86.0: ICD-10-CM

## 2019-08-28 LAB
ADD MAN DIFF?: NO
ADD UMIC: NO
ALANINE AMINOTRANSFERASE: 28 IU/L (ref 13–69)
ALBUMIN/GLOBULIN RATIO: 1.48
ALBUMIN: 4 G/DL (ref 3.3–4.9)
ALKALINE PHOSPHATASE: 48 IU/L (ref 42–121)
ANION GAP: 8 (ref 5–13)
ASPARTATE AMINO TRANSFERASE: 36 IU/L (ref 15–46)
BASOPHIL #: 0.1 10^3/UL (ref 0–0.1)
BASOPHILS %: 0.6 % (ref 0–2)
BILIRUBIN,DIRECT: 0 MG/DL (ref 0–0.2)
BILIRUBIN,TOTAL: 0.7 MG/DL (ref 0.2–1.3)
BLOOD UREA NITROGEN: 17 MG/DL (ref 7–20)
CALCIUM: 9.3 MG/DL (ref 8.4–10.2)
CARBON DIOXIDE: 20 MMOL/L (ref 21–31)
CHLORIDE: 111 MMOL/L (ref 97–110)
CREATININE: 1.38 MG/DL (ref 0.61–1.24)
EOSINOPHILS #: 0.1 10^3/UL (ref 0–0.5)
EOSINOPHILS %: 1.3 % (ref 0–7)
GLOBULIN: 2.7 G/DL (ref 1.3–3.2)
GLUCOSE: 120 MG/DL (ref 70–220)
HEMATOCRIT: 43.1 % (ref 42–52)
HEMOGLOBIN: 14.3 G/DL (ref 14–18)
IMMATURE GRANS #M: 0.05 10^3/UL (ref 0–0.03)
IMMATURE GRANS % (M): 0.5 % (ref 0–0.43)
INR: 1.11
LIPASE: 108 U/L (ref 23–300)
LYMPHOCYTES #: 1.9 10^3/UL (ref 0.8–2.9)
LYMPHOCYTES %: 18.3 % (ref 15–51)
MEAN CORPUSCULAR HEMOGLOBIN: 31.7 PG (ref 29–33)
MEAN CORPUSCULAR HGB CONC: 33.2 G/DL (ref 32–37)
MEAN CORPUSCULAR VOLUME: 95.6 FL (ref 82–101)
MEAN PLATELET VOLUME: 11.4 FL (ref 7.4–10.4)
MONOCYTE #: 0.7 10^3/UL (ref 0.3–0.9)
MONOCYTES %: 6.5 % (ref 0–11)
NEUTROPHIL #: 7.7 10^3/UL (ref 1.6–7.5)
NEUTROPHILS %: 72.8 % (ref 39–77)
NUCLEATED RED BLOOD CELLS #: 0 10^3/UL (ref 0–0)
NUCLEATED RED BLOOD CELLS%: 0 /100WBC (ref 0–0)
PARTIAL THROMBOPLASTIN TIME: 29 SEC (ref 23–35)
PLATELET COUNT: 184 10^3/UL (ref 140–415)
POTASSIUM: 4.2 MMOL/L (ref 3.5–5.1)
PROTIME: 14.4 SEC (ref 11.9–14.9)
PT RATIO: 1.1
RED BLOOD COUNT: 4.51 10^6/UL (ref 4.7–6.1)
RED CELL DISTRIBUTION WIDTH: 14.5 % (ref 11.5–14.5)
SODIUM: 139 MMOL/L (ref 135–144)
TOTAL PROTEIN: 6.7 G/DL (ref 6.1–8.1)
TROPONIN-I: 0.03 NG/ML (ref 0–0.12)
UR ASCORBIC ACID: NEGATIVE MG/DL
UR BILIRUBIN (DIP): NEGATIVE MG/DL
UR BLOOD (DIP): NEGATIVE MG/DL
UR CLARITY: CLEAR
UR COLOR: YELLOW
UR GLUCOSE (DIP): NEGATIVE MG/DL
UR KETONES (DIP): NEGATIVE MG/DL
UR LEUKOCYTE ESTERASE (DIP): NEGATIVE LEU/UL
UR NITRITE (DIP): NEGATIVE MG/DL
UR PH (DIP): 6 (ref 5–9)
UR SPECIFIC GRAVITY (DIP): 1.01 (ref 1–1.03)
UR TOTAL PROTEIN (DIP): NEGATIVE MG/DL
UR UROBILINOGEN (DIP): NEGATIVE MG/DL
WHITE BLOOD COUNT: 10.5 10^3/UL (ref 4.8–10.8)

## 2019-08-28 PROCEDURE — 81003 URINALYSIS AUTO W/O SCOPE: CPT

## 2019-08-28 PROCEDURE — 85610 PROTHROMBIN TIME: CPT

## 2019-08-28 PROCEDURE — 83735 ASSAY OF MAGNESIUM: CPT

## 2019-08-28 PROCEDURE — 83690 ASSAY OF LIPASE: CPT

## 2019-08-28 PROCEDURE — 84100 ASSAY OF PHOSPHORUS: CPT

## 2019-08-28 PROCEDURE — 80053 COMPREHEN METABOLIC PANEL: CPT

## 2019-08-28 PROCEDURE — 36415 COLL VENOUS BLD VENIPUNCTURE: CPT

## 2019-08-28 PROCEDURE — 87205 SMEAR GRAM STAIN: CPT

## 2019-08-28 PROCEDURE — 85730 THROMBOPLASTIN TIME PARTIAL: CPT

## 2019-08-28 PROCEDURE — 99217: CPT

## 2019-08-28 PROCEDURE — 85025 COMPLETE CBC W/AUTO DIFF WBC: CPT

## 2019-08-28 PROCEDURE — 87177 OVA AND PARASITES SMEARS: CPT

## 2019-08-28 PROCEDURE — 84145 PROCALCITONIN (PCT): CPT

## 2019-08-28 PROCEDURE — 87081 CULTURE SCREEN ONLY: CPT

## 2019-08-28 PROCEDURE — 87075 CULTR BACTERIA EXCEPT BLOOD: CPT

## 2019-08-28 PROCEDURE — 87045 FECES CULTURE AEROBIC BACT: CPT

## 2019-08-28 PROCEDURE — 84484 ASSAY OF TROPONIN QUANT: CPT

## 2019-08-28 PROCEDURE — 80048 BASIC METABOLIC PNL TOTAL CA: CPT

## 2019-08-28 PROCEDURE — G0378 HOSPITAL OBSERVATION PER HR: HCPCS

## 2019-08-28 PROCEDURE — 99285 EMERGENCY DEPT VISIT HI MDM: CPT

## 2019-08-28 RX ADMIN — METOPROLOL TARTRATE 1 MG: 50 TABLET, FILM COATED ORAL at 22:02

## 2019-08-28 RX ADMIN — THIAMINE HYDROCHLORIDE 1 MLS/HR: 100 INJECTION, SOLUTION INTRAMUSCULAR; INTRAVENOUS at 14:27

## 2019-08-28 RX ADMIN — TACROLIMUS 1 MG: 1 CAPSULE ORAL at 22:03

## 2019-08-28 RX ADMIN — DILTIAZEM HYDROCHLORIDE 1 MG: 120 CAPSULE, COATED, EXTENDED RELEASE ORAL at 22:03

## 2019-08-28 RX ADMIN — ASPIRIN 81 MG CHEWABLE TABLET 1 MG: 81 TABLET CHEWABLE at 22:02

## 2019-08-28 RX ADMIN — THIAMINE HYDROCHLORIDE 1 MLS/HR: 100 INJECTION, SOLUTION INTRAMUSCULAR; INTRAVENOUS at 20:01

## 2019-08-28 RX ADMIN — MYCOPHENOLIC ACID 1 MG: 180 TABLET, DELAYED RELEASE ORAL at 22:03

## 2019-08-28 RX ADMIN — DILTIAZEM HYDROCHLORIDE SCH MG: 120 CAPSULE, EXTENDED RELEASE ORAL at 22:03

## 2019-08-28 RX ADMIN — PYRIDOXINE HYDROCHLORIDE 1 MLS/HR: 100 INJECTION, SOLUTION INTRAMUSCULAR; INTRAVENOUS at 16:58

## 2019-08-28 RX ADMIN — FOLIC ACID SCH MLS/HR: 5 INJECTION, SOLUTION INTRAMUSCULAR; INTRAVENOUS; SUBCUTANEOUS at 20:01

## 2019-08-28 RX ADMIN — MYCOPHENOLIC ACID SCH MG: 180 TABLET, DELAYED RELEASE ORAL at 22:03

## 2019-08-28 RX ADMIN — ASPIRIN 81 MG SCH MG: 81 TABLET ORAL at 22:02

## 2019-08-28 RX ADMIN — PANTOPRAZOLE SODIUM SCH MG: 40 TABLET, DELAYED RELEASE ORAL at 22:02

## 2019-08-28 RX ADMIN — MIRTAZAPINE 1 MG: 15 TABLET, FILM COATED ORAL at 22:03

## 2019-08-28 RX ADMIN — TACROLIMUS SCH MG: 1 CAPSULE ORAL at 22:03

## 2019-08-28 RX ADMIN — METOPROLOL TARTRATE SCH MG: 50 TABLET, FILM COATED ORAL at 22:02

## 2019-08-28 RX ADMIN — PANTOPRAZOLE SODIUM 1 MG: 40 TABLET, DELAYED RELEASE ORAL at 22:02

## 2019-08-28 RX ADMIN — MIRTAZAPINE SCH MG: 15 TABLET, FILM COATED ORAL at 22:03

## 2019-08-29 VITALS — DIASTOLIC BLOOD PRESSURE: 88 MMHG | HEART RATE: 78 BPM | SYSTOLIC BLOOD PRESSURE: 137 MMHG | RESPIRATION RATE: 18 BRPM

## 2019-08-29 VITALS — HEART RATE: 70 BPM | SYSTOLIC BLOOD PRESSURE: 122 MMHG | DIASTOLIC BLOOD PRESSURE: 75 MMHG | RESPIRATION RATE: 18 BRPM

## 2019-08-29 VITALS — DIASTOLIC BLOOD PRESSURE: 97 MMHG | RESPIRATION RATE: 18 BRPM | SYSTOLIC BLOOD PRESSURE: 155 MMHG | HEART RATE: 72 BPM

## 2019-08-29 VITALS — DIASTOLIC BLOOD PRESSURE: 60 MMHG | RESPIRATION RATE: 17 BRPM | HEART RATE: 70 BPM | SYSTOLIC BLOOD PRESSURE: 133 MMHG

## 2019-08-29 LAB
ADD MAN DIFF?: NO
ANION GAP: 4 (ref 5–13)
BASOPHIL #: 0.1 10^3/UL (ref 0–0.1)
BASOPHILS %: 0.6 % (ref 0–2)
BLOOD UREA NITROGEN: 18 MG/DL (ref 7–20)
CALCIUM: 8.7 MG/DL (ref 8.4–10.2)
CARBON DIOXIDE: 23 MMOL/L (ref 21–31)
CHLORIDE: 113 MMOL/L (ref 97–110)
CREATININE: 1.25 MG/DL (ref 0.61–1.24)
EOSINOPHILS #: 0.1 10^3/UL (ref 0–0.5)
EOSINOPHILS %: 1.3 % (ref 0–7)
GLUCOSE: 103 MG/DL (ref 70–220)
HEMATOCRIT: 39 % (ref 42–52)
HEMOGLOBIN: 13.2 G/DL (ref 14–18)
IMMATURE GRANS #M: 0.05 10^3/UL (ref 0–0.03)
IMMATURE GRANS % (M): 0.6 % (ref 0–0.43)
LYMPHOCYTES #: 2 10^3/UL (ref 0.8–2.9)
LYMPHOCYTES %: 21.8 % (ref 15–51)
MAGNESIUM: 1.5 MG/DL (ref 1.7–2.5)
MEAN CORPUSCULAR HEMOGLOBIN: 32 PG (ref 29–33)
MEAN CORPUSCULAR HGB CONC: 33.8 G/DL (ref 32–37)
MEAN CORPUSCULAR VOLUME: 94.7 FL (ref 82–101)
MEAN PLATELET VOLUME: 11.4 FL (ref 7.4–10.4)
MONOCYTE #: 0.7 10^3/UL (ref 0.3–0.9)
MONOCYTES %: 7.3 % (ref 0–11)
NEUTROPHIL #: 6.2 10^3/UL (ref 1.6–7.5)
NEUTROPHILS %: 68.4 % (ref 39–77)
NUCLEATED RED BLOOD CELLS #: 0 10^3/UL (ref 0–0)
NUCLEATED RED BLOOD CELLS%: 0 /100WBC (ref 0–0)
PHOSPHORUS: 2.6 MG/DL (ref 2.5–4.9)
PLATELET COUNT: 205 10^3/UL (ref 140–415)
POTASSIUM: 2.9 MMOL/L (ref 3.5–5.1)
PROCALCITONIN: < 0.02 NG/ML (ref 0–0.1)
RED BLOOD COUNT: 4.12 10^6/UL (ref 4.7–6.1)
RED CELL DISTRIBUTION WIDTH: 14.5 % (ref 11.5–14.5)
SODIUM: 140 MMOL/L (ref 135–144)
WHITE BLOOD COUNT: 9.1 10^3/UL (ref 4.8–10.8)

## 2019-08-29 RX ADMIN — FOLIC ACID SCH MLS/HR: 5 INJECTION, SOLUTION INTRAMUSCULAR; INTRAVENOUS; SUBCUTANEOUS at 17:03

## 2019-08-29 RX ADMIN — MYCOPHENOLIC ACID 1 MG: 180 TABLET, DELAYED RELEASE ORAL at 20:20

## 2019-08-29 RX ADMIN — THIAMINE HYDROCHLORIDE 1 MLS/HR: 100 INJECTION, SOLUTION INTRAMUSCULAR; INTRAVENOUS at 12:30

## 2019-08-29 RX ADMIN — METOPROLOL TARTRATE SCH MG: 50 TABLET, FILM COATED ORAL at 20:20

## 2019-08-29 RX ADMIN — POTASSIUM CHLORIDE 1 MEQ: 1500 TABLET, EXTENDED RELEASE ORAL at 07:02

## 2019-08-29 RX ADMIN — TACROLIMUS SCH MG: 1 CAPSULE ORAL at 08:42

## 2019-08-29 RX ADMIN — MIRTAZAPINE SCH MG: 15 TABLET, FILM COATED ORAL at 20:20

## 2019-08-29 RX ADMIN — VANCOMYCIN SCH MG: KIT at 10:55

## 2019-08-29 RX ADMIN — FOLIC ACID SCH MLS/HR: 5 INJECTION, SOLUTION INTRAMUSCULAR; INTRAVENOUS; SUBCUTANEOUS at 12:30

## 2019-08-29 RX ADMIN — DILTIAZEM HYDROCHLORIDE SCH MG: 120 CAPSULE, EXTENDED RELEASE ORAL at 08:41

## 2019-08-29 RX ADMIN — ASPIRIN 81 MG CHEWABLE TABLET 1 MG: 81 TABLET CHEWABLE at 20:20

## 2019-08-29 RX ADMIN — ASPIRIN 81 MG CHEWABLE TABLET 1 MG: 81 TABLET CHEWABLE at 08:42

## 2019-08-29 RX ADMIN — THIAMINE HYDROCHLORIDE 1 MLS/HR: 100 INJECTION, SOLUTION INTRAMUSCULAR; INTRAVENOUS at 17:03

## 2019-08-29 RX ADMIN — PANTOPRAZOLE SODIUM 1 MG: 40 TABLET, DELAYED RELEASE ORAL at 20:20

## 2019-08-29 RX ADMIN — DILTIAZEM HYDROCHLORIDE 1 MG: 120 CAPSULE, COATED, EXTENDED RELEASE ORAL at 08:41

## 2019-08-29 RX ADMIN — METOPROLOL TARTRATE 1 MG: 50 TABLET, FILM COATED ORAL at 08:42

## 2019-08-29 RX ADMIN — METOPROLOL TARTRATE 1 MG: 50 TABLET, FILM COATED ORAL at 20:20

## 2019-08-29 RX ADMIN — VANCOMYCIN 1 MG: KIT at 10:55

## 2019-08-29 RX ADMIN — TACROLIMUS 1 MG: 1 CAPSULE ORAL at 08:42

## 2019-08-29 RX ADMIN — VANCOMYCIN 1 MG: KIT at 23:45

## 2019-08-29 RX ADMIN — VANCOMYCIN SCH MG: KIT at 17:03

## 2019-08-29 RX ADMIN — MAGNESIUM SULFATE HEPTAHYDRATE 1 MLS/HR: 40 INJECTION, SOLUTION INTRAVENOUS at 09:22

## 2019-08-29 RX ADMIN — DILTIAZEM HYDROCHLORIDE SCH MG: 120 CAPSULE, EXTENDED RELEASE ORAL at 20:20

## 2019-08-29 RX ADMIN — MYCOPHENOLIC ACID SCH MG: 180 TABLET, DELAYED RELEASE ORAL at 08:41

## 2019-08-29 RX ADMIN — METOPROLOL TARTRATE SCH MG: 50 TABLET, FILM COATED ORAL at 08:42

## 2019-08-29 RX ADMIN — PANTOPRAZOLE SODIUM 1 MG: 40 TABLET, DELAYED RELEASE ORAL at 08:41

## 2019-08-29 RX ADMIN — PANTOPRAZOLE SODIUM SCH MG: 40 TABLET, DELAYED RELEASE ORAL at 20:20

## 2019-08-29 RX ADMIN — MYCOPHENOLIC ACID 1 MG: 180 TABLET, DELAYED RELEASE ORAL at 08:41

## 2019-08-29 RX ADMIN — TACROLIMUS 1 MG: 1 CAPSULE ORAL at 20:20

## 2019-08-29 RX ADMIN — ASPIRIN 81 MG SCH MG: 81 TABLET ORAL at 20:20

## 2019-08-29 RX ADMIN — VANCOMYCIN SCH MG: KIT at 23:45

## 2019-08-29 RX ADMIN — TACROLIMUS SCH MG: 1 CAPSULE ORAL at 20:20

## 2019-08-29 RX ADMIN — MIRTAZAPINE 1 MG: 15 TABLET, FILM COATED ORAL at 20:20

## 2019-08-29 RX ADMIN — DILTIAZEM HYDROCHLORIDE 1 MG: 120 CAPSULE, COATED, EXTENDED RELEASE ORAL at 20:20

## 2019-08-29 RX ADMIN — VANCOMYCIN 1 MG: KIT at 17:03

## 2019-08-29 RX ADMIN — PANTOPRAZOLE SODIUM SCH MG: 40 TABLET, DELAYED RELEASE ORAL at 08:41

## 2019-08-29 RX ADMIN — ASPIRIN 81 MG SCH MG: 81 TABLET ORAL at 08:42

## 2019-08-29 RX ADMIN — MYCOPHENOLIC ACID SCH MG: 180 TABLET, DELAYED RELEASE ORAL at 20:20

## 2019-08-30 VITALS — SYSTOLIC BLOOD PRESSURE: 133 MMHG | HEART RATE: 60 BPM | DIASTOLIC BLOOD PRESSURE: 69 MMHG | RESPIRATION RATE: 17 BRPM

## 2019-08-30 VITALS — SYSTOLIC BLOOD PRESSURE: 161 MMHG | RESPIRATION RATE: 18 BRPM | DIASTOLIC BLOOD PRESSURE: 89 MMHG | HEART RATE: 73 BPM

## 2019-08-30 VITALS — DIASTOLIC BLOOD PRESSURE: 88 MMHG | RESPIRATION RATE: 18 BRPM | HEART RATE: 73 BPM | SYSTOLIC BLOOD PRESSURE: 148 MMHG

## 2019-08-30 VITALS — SYSTOLIC BLOOD PRESSURE: 136 MMHG | HEART RATE: 69 BPM | DIASTOLIC BLOOD PRESSURE: 87 MMHG | RESPIRATION RATE: 20 BRPM

## 2019-08-30 LAB
ANION GAP: 4 (ref 5–13)
BLOOD UREA NITROGEN: 18 MG/DL (ref 7–20)
CALCIUM: 8.7 MG/DL (ref 8.4–10.2)
CARBON DIOXIDE: 22 MMOL/L (ref 21–31)
CHLORIDE: 115 MMOL/L (ref 97–110)
CREATININE: 1.1 MG/DL (ref 0.61–1.24)
GLUCOSE: 106 MG/DL (ref 70–220)
MAGNESIUM: 1.6 MG/DL (ref 1.7–2.5)
PHOSPHORUS: 2.5 MG/DL (ref 2.5–4.9)
POTASSIUM: 2.9 MMOL/L (ref 3.5–5.1)
SODIUM: 141 MMOL/L (ref 135–144)

## 2019-08-30 RX ADMIN — ASPIRIN 81 MG SCH MG: 81 TABLET ORAL at 08:23

## 2019-08-30 RX ADMIN — PANTOPRAZOLE SODIUM 1 MG: 40 TABLET, DELAYED RELEASE ORAL at 08:23

## 2019-08-30 RX ADMIN — MIRTAZAPINE 1 MG: 15 TABLET, FILM COATED ORAL at 20:38

## 2019-08-30 RX ADMIN — METOPROLOL TARTRATE SCH MG: 50 TABLET, FILM COATED ORAL at 20:38

## 2019-08-30 RX ADMIN — DILTIAZEM HYDROCHLORIDE SCH MG: 120 CAPSULE, EXTENDED RELEASE ORAL at 08:23

## 2019-08-30 RX ADMIN — MYCOPHENOLIC ACID SCH MG: 180 TABLET, DELAYED RELEASE ORAL at 08:24

## 2019-08-30 RX ADMIN — FOLIC ACID SCH MLS/HR: 5 INJECTION, SOLUTION INTRAMUSCULAR; INTRAVENOUS; SUBCUTANEOUS at 08:24

## 2019-08-30 RX ADMIN — PANTOPRAZOLE SODIUM SCH MG: 40 TABLET, DELAYED RELEASE ORAL at 20:38

## 2019-08-30 RX ADMIN — DILTIAZEM HYDROCHLORIDE 1 MG: 120 CAPSULE, COATED, EXTENDED RELEASE ORAL at 08:23

## 2019-08-30 RX ADMIN — VANCOMYCIN 1 MG: KIT at 17:20

## 2019-08-30 RX ADMIN — PANTOPRAZOLE SODIUM 1 MG: 40 TABLET, DELAYED RELEASE ORAL at 20:38

## 2019-08-30 RX ADMIN — METOPROLOL TARTRATE 1 MG: 50 TABLET, FILM COATED ORAL at 20:38

## 2019-08-30 RX ADMIN — MYCOPHENOLIC ACID SCH MG: 180 TABLET, DELAYED RELEASE ORAL at 20:37

## 2019-08-30 RX ADMIN — MYCOPHENOLIC ACID 1 MG: 180 TABLET, DELAYED RELEASE ORAL at 08:24

## 2019-08-30 RX ADMIN — VANCOMYCIN SCH MG: KIT at 17:20

## 2019-08-30 RX ADMIN — VANCOMYCIN 1 MG: KIT at 12:30

## 2019-08-30 RX ADMIN — DILTIAZEM HYDROCHLORIDE SCH MG: 120 CAPSULE, EXTENDED RELEASE ORAL at 20:38

## 2019-08-30 RX ADMIN — METOPROLOL TARTRATE 1 MG: 50 TABLET, FILM COATED ORAL at 08:23

## 2019-08-30 RX ADMIN — DILTIAZEM HYDROCHLORIDE 1 MG: 120 CAPSULE, COATED, EXTENDED RELEASE ORAL at 20:38

## 2019-08-30 RX ADMIN — PANTOPRAZOLE SODIUM SCH MG: 40 TABLET, DELAYED RELEASE ORAL at 08:23

## 2019-08-30 RX ADMIN — METOPROLOL TARTRATE SCH MG: 50 TABLET, FILM COATED ORAL at 08:23

## 2019-08-30 RX ADMIN — MIRTAZAPINE SCH MG: 15 TABLET, FILM COATED ORAL at 20:38

## 2019-08-30 RX ADMIN — TACROLIMUS 1 MG: 1 CAPSULE ORAL at 08:23

## 2019-08-30 RX ADMIN — VANCOMYCIN 1 MG: KIT at 05:55

## 2019-08-30 RX ADMIN — TACROLIMUS 1 MG: 1 CAPSULE ORAL at 20:37

## 2019-08-30 RX ADMIN — VANCOMYCIN SCH MG: KIT at 05:55

## 2019-08-30 RX ADMIN — TACROLIMUS SCH MG: 1 CAPSULE ORAL at 08:23

## 2019-08-30 RX ADMIN — TACROLIMUS SCH MG: 1 CAPSULE ORAL at 20:37

## 2019-08-30 RX ADMIN — MYCOPHENOLIC ACID 1 MG: 180 TABLET, DELAYED RELEASE ORAL at 20:37

## 2019-08-30 RX ADMIN — VANCOMYCIN SCH MG: KIT at 12:30

## 2019-08-30 RX ADMIN — POTASSIUM CHLORIDE 1 MEQ: 1500 TABLET, EXTENDED RELEASE ORAL at 06:59

## 2019-08-30 RX ADMIN — ASPIRIN 81 MG CHEWABLE TABLET 1 MG: 81 TABLET CHEWABLE at 20:38

## 2019-08-30 RX ADMIN — ASPIRIN 81 MG SCH MG: 81 TABLET ORAL at 20:38

## 2019-08-30 RX ADMIN — THIAMINE HYDROCHLORIDE 1 MLS/HR: 100 INJECTION, SOLUTION INTRAMUSCULAR; INTRAVENOUS at 08:24

## 2019-08-30 RX ADMIN — ASPIRIN 81 MG CHEWABLE TABLET 1 MG: 81 TABLET CHEWABLE at 08:23

## 2019-08-31 VITALS — DIASTOLIC BLOOD PRESSURE: 87 MMHG | HEART RATE: 69 BPM | SYSTOLIC BLOOD PRESSURE: 139 MMHG | RESPIRATION RATE: 18 BRPM

## 2019-08-31 VITALS — RESPIRATION RATE: 18 BRPM | DIASTOLIC BLOOD PRESSURE: 96 MMHG | HEART RATE: 80 BPM | SYSTOLIC BLOOD PRESSURE: 157 MMHG

## 2019-08-31 LAB
ANION GAP: 8 (ref 5–13)
BLOOD UREA NITROGEN: 15 MG/DL (ref 7–20)
CALCIUM: 9.1 MG/DL (ref 8.4–10.2)
CARBON DIOXIDE: 23 MMOL/L (ref 21–31)
CHLORIDE: 110 MMOL/L (ref 97–110)
CREATININE: 1.01 MG/DL (ref 0.61–1.24)
GLUCOSE: 126 MG/DL (ref 70–220)
POTASSIUM: 3.4 MMOL/L (ref 3.5–5.1)
SODIUM: 141 MMOL/L (ref 135–144)

## 2019-08-31 RX ADMIN — VANCOMYCIN SCH MG: KIT at 11:32

## 2019-08-31 RX ADMIN — TACROLIMUS SCH MG: 1 CAPSULE ORAL at 08:21

## 2019-08-31 RX ADMIN — ASPIRIN 81 MG SCH MG: 81 TABLET ORAL at 08:21

## 2019-08-31 RX ADMIN — DILTIAZEM HYDROCHLORIDE SCH MG: 120 CAPSULE, EXTENDED RELEASE ORAL at 08:22

## 2019-08-31 RX ADMIN — DILTIAZEM HYDROCHLORIDE 1 MG: 120 CAPSULE, COATED, EXTENDED RELEASE ORAL at 08:22

## 2019-08-31 RX ADMIN — TACROLIMUS 1 MG: 1 CAPSULE ORAL at 08:21

## 2019-08-31 RX ADMIN — VANCOMYCIN 1 MG: KIT at 06:15

## 2019-08-31 RX ADMIN — VANCOMYCIN SCH MG: KIT at 00:07

## 2019-08-31 RX ADMIN — PANTOPRAZOLE SODIUM SCH MG: 40 TABLET, DELAYED RELEASE ORAL at 08:21

## 2019-08-31 RX ADMIN — VANCOMYCIN 1 MG: KIT at 00:07

## 2019-08-31 RX ADMIN — METOPROLOL TARTRATE SCH MG: 50 TABLET, FILM COATED ORAL at 08:22

## 2019-08-31 RX ADMIN — ASPIRIN 81 MG CHEWABLE TABLET 1 MG: 81 TABLET CHEWABLE at 08:21

## 2019-08-31 RX ADMIN — VANCOMYCIN SCH MG: KIT at 06:15

## 2019-08-31 RX ADMIN — MYCOPHENOLIC ACID SCH MG: 180 TABLET, DELAYED RELEASE ORAL at 08:21

## 2019-08-31 RX ADMIN — VANCOMYCIN 1 MG: KIT at 11:32

## 2019-08-31 RX ADMIN — METOPROLOL TARTRATE 1 MG: 50 TABLET, FILM COATED ORAL at 08:22

## 2019-08-31 RX ADMIN — MYCOPHENOLIC ACID 1 MG: 180 TABLET, DELAYED RELEASE ORAL at 08:21

## 2019-08-31 RX ADMIN — PANTOPRAZOLE SODIUM 1 MG: 40 TABLET, DELAYED RELEASE ORAL at 08:21

## 2019-08-31 RX ADMIN — POTASSIUM CHLORIDE 1 MEQ: 1500 TABLET, EXTENDED RELEASE ORAL at 11:32

## 2020-01-02 ENCOUNTER — TELEPHONE (OUTPATIENT)
Dept: TRANSPLANT | Facility: CLINIC | Age: 55
End: 2020-01-02

## 2020-01-02 NOTE — TELEPHONE ENCOUNTER
Pt calling his 20th anniversary of  transplant is coming up next week.  He has been doing great  He has read updates on transplants and was wondering if anyone has made it this far after a   transplant  If anyone would like to do a study or research he has kept a lot of journals  He is living in [place] and doing great He is very grateful to our program.

## 2020-01-09 NOTE — TELEPHONE ENCOUNTER
Called Morteza Ornelas back.  Left message: re we appreciate him offering his data. Unfortunately no current studies needing that data but will reach out in the future.  Congratulations on making it to 20 yrs.

## 2021-01-12 ENCOUNTER — HOSPITAL ENCOUNTER (OUTPATIENT)
Dept: HOSPITAL 54 - MSC | Age: 56
Discharge: HOME | End: 2021-01-12
Attending: INTERNAL MEDICINE
Payer: MEDICARE

## 2021-01-12 DIAGNOSIS — Z48.22: Primary | ICD-10-CM

## 2021-01-12 DIAGNOSIS — Z87.09: ICD-10-CM

## 2021-01-12 DIAGNOSIS — Z48.298: ICD-10-CM

## 2021-01-12 DIAGNOSIS — N43.3: ICD-10-CM

## 2021-01-12 DIAGNOSIS — Z86.19: ICD-10-CM

## 2021-01-12 DIAGNOSIS — I10: ICD-10-CM

## 2021-01-12 DIAGNOSIS — Z79.899: ICD-10-CM

## 2021-01-12 DIAGNOSIS — I48.0: ICD-10-CM

## 2021-01-12 DIAGNOSIS — F41.9: ICD-10-CM

## 2021-01-12 LAB
ALBUMIN SERPL BCP-MCNC: 3.6 G/DL (ref 3.4–5)
ALP SERPL-CCNC: 69 U/L (ref 46–116)
ALT SERPL W P-5'-P-CCNC: 20 U/L (ref 12–78)
AST SERPL W P-5'-P-CCNC: 18 U/L (ref 15–37)
BASOPHILS # BLD AUTO: 0.1 /CMM (ref 0–0.2)
BASOPHILS NFR BLD AUTO: 0.5 % (ref 0–2)
BILIRUB SERPL-MCNC: 0.6 MG/DL (ref 0.2–1)
BILIRUB UR QL STRIP: NEGATIVE
BUN SERPL-MCNC: 20 MG/DL (ref 7–18)
CALCIUM SERPL-MCNC: 9 MG/DL (ref 8.5–10.1)
CHLORIDE SERPL-SCNC: 105 MMOL/L (ref 98–107)
CO2 SERPL-SCNC: 31 MMOL/L (ref 21–32)
COLOR UR: YELLOW
CREAT SERPL-MCNC: 1.5 MG/DL (ref 0.6–1.3)
CRP SERPL-MCNC: < 0.2 MG/DL (ref 0–0.9)
EOSINOPHIL NFR BLD AUTO: 1.9 % (ref 0–6)
GLUCOSE SERPL-MCNC: 105 MG/DL (ref 74–106)
GLUCOSE UR STRIP-MCNC: NEGATIVE MG/DL
HCT VFR BLD AUTO: 45 % (ref 39–51)
HGB BLD-MCNC: 14.9 G/DL (ref 13.5–17.5)
LEUKOCYTE ESTERASE UR QL STRIP: NEGATIVE
LYMPHOCYTES NFR BLD AUTO: 1.5 /CMM (ref 0.8–4.8)
LYMPHOCYTES NFR BLD AUTO: 15.5 % (ref 20–44)
MAGNESIUM SERPL-MCNC: 2 MG/DL (ref 1.8–2.4)
MCHC RBC AUTO-ENTMCNC: 33 G/DL (ref 31–36)
MCV RBC AUTO: 100 FL (ref 80–96)
MONOCYTES NFR BLD AUTO: 0.7 /CMM (ref 0.1–1.3)
MONOCYTES NFR BLD AUTO: 7.2 % (ref 2–12)
NEUTROPHILS # BLD AUTO: 7.3 /CMM (ref 1.8–8.9)
NEUTROPHILS NFR BLD AUTO: 74.9 % (ref 43–81)
NITRITE UR QL STRIP: NEGATIVE
PH UR STRIP: 6.5 [PH] (ref 5–8)
PHOSPHATE SERPL-MCNC: 3.3 MG/DL (ref 2.5–4.9)
PLATELET # BLD AUTO: 205 /CMM (ref 150–450)
POTASSIUM SERPL-SCNC: 4.1 MMOL/L (ref 3.5–5.1)
PROT SERPL-MCNC: 6.3 G/DL (ref 6.4–8.2)
PROT UR QL STRIP: NEGATIVE MG/DL
PROT UR-MCNC: 27.3 MG/DL (ref 0–11.9)
RBC # BLD AUTO: 4.46 MIL/UL (ref 4.5–6)
SODIUM SERPL-SCNC: 145 MMOL/L (ref 136–145)
UROBILINOGEN UR STRIP-MCNC: 0.2 EU/DL
WBC #/AREA URNS HPF: (no result) /HPF (ref 0–3)
WBC NRBC COR # BLD AUTO: 9.8 K/UL (ref 4.3–11)

## 2021-01-12 PROCEDURE — 84100 ASSAY OF PHOSPHORUS: CPT

## 2021-01-12 PROCEDURE — 83735 ASSAY OF MAGNESIUM: CPT

## 2021-01-12 PROCEDURE — 85652 RBC SED RATE AUTOMATED: CPT

## 2021-01-12 PROCEDURE — 80053 COMPREHEN METABOLIC PANEL: CPT

## 2021-01-12 PROCEDURE — 82570 ASSAY OF URINE CREATININE: CPT

## 2021-01-12 PROCEDURE — 82306 VITAMIN D 25 HYDROXY: CPT

## 2021-01-12 PROCEDURE — 85025 COMPLETE CBC W/AUTO DIFF WBC: CPT

## 2021-01-12 PROCEDURE — 81001 URINALYSIS AUTO W/SCOPE: CPT

## 2021-01-12 PROCEDURE — 82043 UR ALBUMIN QUANTITATIVE: CPT

## 2021-01-12 PROCEDURE — 80197 ASSAY OF TACROLIMUS: CPT

## 2021-01-12 PROCEDURE — 36415 COLL VENOUS BLD VENIPUNCTURE: CPT

## 2021-01-12 PROCEDURE — 86140 C-REACTIVE PROTEIN: CPT

## 2021-01-12 PROCEDURE — 84155 ASSAY OF PROTEIN SERUM: CPT

## 2021-10-13 ENCOUNTER — HOSPITAL ENCOUNTER (OUTPATIENT)
Dept: HOSPITAL 54 - MSC | Age: 56
Discharge: HOME | End: 2021-10-13
Attending: INTERNAL MEDICINE
Payer: MEDICARE

## 2021-10-13 DIAGNOSIS — Z79.82: ICD-10-CM

## 2021-10-13 DIAGNOSIS — Z79.899: ICD-10-CM

## 2021-10-13 DIAGNOSIS — N43.3: ICD-10-CM

## 2021-10-13 DIAGNOSIS — Z94.83: ICD-10-CM

## 2021-10-13 DIAGNOSIS — K20.80: ICD-10-CM

## 2021-10-13 DIAGNOSIS — I10: ICD-10-CM

## 2021-10-13 DIAGNOSIS — Z94.0: ICD-10-CM

## 2021-10-13 DIAGNOSIS — Z51.89: Primary | ICD-10-CM

## 2021-10-13 DIAGNOSIS — Z79.52: ICD-10-CM

## 2021-10-13 DIAGNOSIS — F41.9: ICD-10-CM

## 2021-10-13 DIAGNOSIS — I48.0: ICD-10-CM

## 2022-01-25 ENCOUNTER — HOSPITAL ENCOUNTER (OUTPATIENT)
Dept: HOSPITAL 54 - MSC | Age: 57
Discharge: HOME | End: 2022-01-25
Attending: INTERNAL MEDICINE
Payer: MEDICARE

## 2022-01-25 DIAGNOSIS — Z94.0: ICD-10-CM

## 2022-01-25 DIAGNOSIS — Z79.82: ICD-10-CM

## 2022-01-25 DIAGNOSIS — Z86.19: ICD-10-CM

## 2022-01-25 DIAGNOSIS — F41.9: ICD-10-CM

## 2022-01-25 DIAGNOSIS — N43.3: ICD-10-CM

## 2022-01-25 DIAGNOSIS — I48.0: ICD-10-CM

## 2022-01-25 DIAGNOSIS — Z79.899: ICD-10-CM

## 2022-01-25 DIAGNOSIS — Z79.52: ICD-10-CM

## 2022-01-25 DIAGNOSIS — Z94.83: ICD-10-CM

## 2022-01-25 DIAGNOSIS — K22.10: ICD-10-CM

## 2022-01-25 DIAGNOSIS — I10: Primary | ICD-10-CM

## 2022-08-23 NOTE — NUR
Patient calm and comfortable laying in bed with no signs of distress ; call light with in 
reach, all safety devices in place. Anesthesia Volume In Cc: 2

## 2023-02-03 ENCOUNTER — HOSPITAL ENCOUNTER (OUTPATIENT)
Dept: HOSPITAL 54 - MSC | Age: 58
Discharge: HOME | End: 2023-02-03
Attending: INTERNAL MEDICINE
Payer: MEDICARE

## 2023-02-03 DIAGNOSIS — F91.8: ICD-10-CM

## 2023-02-03 DIAGNOSIS — I48.0: ICD-10-CM

## 2023-02-03 DIAGNOSIS — F41.9: ICD-10-CM

## 2023-02-03 DIAGNOSIS — K20.80: ICD-10-CM

## 2023-02-03 DIAGNOSIS — I10: ICD-10-CM

## 2023-02-03 DIAGNOSIS — F39: Primary | ICD-10-CM

## 2023-02-03 DIAGNOSIS — Z94.0: ICD-10-CM

## 2023-02-03 DIAGNOSIS — N43.3: ICD-10-CM

## 2023-02-03 DIAGNOSIS — R05.3: ICD-10-CM

## 2023-02-03 DIAGNOSIS — Z94.83: ICD-10-CM

## 2023-02-10 ENCOUNTER — HOSPITAL ENCOUNTER (OUTPATIENT)
Dept: HOSPITAL 54 - MSC | Age: 58
Discharge: HOME | End: 2023-02-10
Attending: INTERNAL MEDICINE
Payer: MEDICARE

## 2023-02-10 DIAGNOSIS — R55: Primary | ICD-10-CM

## 2023-02-10 DIAGNOSIS — Z94.83: ICD-10-CM

## 2023-02-10 DIAGNOSIS — Z86.19: ICD-10-CM

## 2023-02-10 DIAGNOSIS — I10: ICD-10-CM

## 2023-02-10 DIAGNOSIS — I48.0: ICD-10-CM

## 2023-02-10 DIAGNOSIS — Z94.0: ICD-10-CM

## 2023-02-10 DIAGNOSIS — R05.3: ICD-10-CM

## 2023-02-10 DIAGNOSIS — F91.9: ICD-10-CM
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
## 2023-02-10 DIAGNOSIS — F39: ICD-10-CM

## 2023-02-10 DIAGNOSIS — R07.81: ICD-10-CM

## 2023-02-10 DIAGNOSIS — K20.80: ICD-10-CM

## 2023-02-10 DIAGNOSIS — N43.3: ICD-10-CM

## 2023-02-10 DIAGNOSIS — F41.9: ICD-10-CM

## 2023-02-24 ENCOUNTER — HOSPITAL ENCOUNTER (OUTPATIENT)
Dept: HOSPITAL 54 - MSC | Age: 58
Discharge: HOME | End: 2023-02-24
Attending: INTERNAL MEDICINE
Payer: MEDICARE

## 2023-02-24 DIAGNOSIS — Z94.0: ICD-10-CM

## 2023-02-24 DIAGNOSIS — F39: ICD-10-CM

## 2023-02-24 DIAGNOSIS — R55: Primary | ICD-10-CM

## 2023-02-24 DIAGNOSIS — F91.9: ICD-10-CM

## 2023-02-24 DIAGNOSIS — I10: ICD-10-CM

## 2023-02-24 DIAGNOSIS — Z86.19: ICD-10-CM

## 2023-02-24 DIAGNOSIS — K20.80: ICD-10-CM

## 2023-02-24 DIAGNOSIS — N43.3: ICD-10-CM

## 2023-02-24 DIAGNOSIS — Z94.83: ICD-10-CM

## 2023-02-24 DIAGNOSIS — I48.0: ICD-10-CM

## 2023-02-24 DIAGNOSIS — F41.9: ICD-10-CM

## 2023-03-21 ENCOUNTER — HOSPITAL ENCOUNTER (OUTPATIENT)
Dept: HOSPITAL 54 - CT | Age: 58
Discharge: HOME | End: 2023-03-21
Attending: INTERNAL MEDICINE
Payer: MEDICARE
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
## 2023-03-21 DIAGNOSIS — S06.0XAA: Primary | ICD-10-CM
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
## 2023-03-21 DIAGNOSIS — R51.9: ICD-10-CM
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
## 2023-03-22 ENCOUNTER — HOSPITAL ENCOUNTER (OUTPATIENT)
Dept: HOSPITAL 54 - MSC | Age: 58
Discharge: HOME | End: 2023-03-22
Attending: INTERNAL MEDICINE
Payer: MEDICARE

## 2023-03-22 DIAGNOSIS — M54.50: Primary | ICD-10-CM

## 2023-03-22 DIAGNOSIS — F39: ICD-10-CM

## 2023-03-22 DIAGNOSIS — K20.80: ICD-10-CM

## 2023-03-22 DIAGNOSIS — I10: ICD-10-CM

## 2023-03-22 DIAGNOSIS — I48.0: ICD-10-CM

## 2023-03-22 DIAGNOSIS — T86.19: ICD-10-CM

## 2023-03-22 DIAGNOSIS — Z94.83: ICD-10-CM

## 2023-03-22 DIAGNOSIS — N43.3: ICD-10-CM

## 2023-03-22 DIAGNOSIS — Z86.19: ICD-10-CM

## 2023-03-22 DIAGNOSIS — F41.9: ICD-10-CM

## 2023-03-22 DIAGNOSIS — R55: ICD-10-CM

## 2023-03-22 DIAGNOSIS — Z94.0: ICD-10-CM

## 2023-03-22 DIAGNOSIS — F91.9: ICD-10-CM

## 2023-04-05 ENCOUNTER — HOSPITAL ENCOUNTER (OUTPATIENT)
Dept: HOSPITAL 54 - MSC | Age: 58
Discharge: HOME | End: 2023-04-05
Attending: INTERNAL MEDICINE
Payer: MEDICARE

## 2023-04-05 DIAGNOSIS — Z86.19: ICD-10-CM

## 2023-04-05 DIAGNOSIS — Z94.0: ICD-10-CM

## 2023-04-05 DIAGNOSIS — F39: ICD-10-CM

## 2023-04-05 DIAGNOSIS — I10: ICD-10-CM

## 2023-04-05 DIAGNOSIS — N43.3: ICD-10-CM

## 2023-04-05 DIAGNOSIS — K20.80: ICD-10-CM

## 2023-04-05 DIAGNOSIS — F91.9: ICD-10-CM

## 2023-04-05 DIAGNOSIS — M25.512: Primary | ICD-10-CM

## 2023-04-05 DIAGNOSIS — Z94.83: ICD-10-CM

## 2023-04-05 DIAGNOSIS — F41.9: ICD-10-CM

## 2023-04-05 DIAGNOSIS — N17.8: ICD-10-CM

## 2023-04-05 DIAGNOSIS — I48.0: ICD-10-CM

## 2023-04-05 DIAGNOSIS — R60.0: ICD-10-CM

## 2023-04-05 DIAGNOSIS — R55: ICD-10-CM

## 2023-04-05 DIAGNOSIS — S32.009D: ICD-10-CM

## 2023-04-11 ENCOUNTER — HOSPITAL ENCOUNTER (OUTPATIENT)
Dept: HOSPITAL 54 - MSC | Age: 58
Discharge: HOME | End: 2023-04-11
Attending: INTERNAL MEDICINE
Payer: MEDICARE

## 2023-04-11 DIAGNOSIS — I48.0: ICD-10-CM

## 2023-04-11 DIAGNOSIS — Z86.19: ICD-10-CM

## 2023-04-11 DIAGNOSIS — N17.8: ICD-10-CM

## 2023-04-11 DIAGNOSIS — F91.9: ICD-10-CM

## 2023-04-11 DIAGNOSIS — F41.9: ICD-10-CM

## 2023-04-11 DIAGNOSIS — N43.3: ICD-10-CM

## 2023-04-11 DIAGNOSIS — F39: ICD-10-CM

## 2023-04-11 DIAGNOSIS — Z94.83: ICD-10-CM

## 2023-04-11 DIAGNOSIS — R55: ICD-10-CM

## 2023-04-11 DIAGNOSIS — S32.009D: ICD-10-CM

## 2023-04-11 DIAGNOSIS — M25.512: ICD-10-CM

## 2023-04-11 DIAGNOSIS — R60.0: Primary | ICD-10-CM

## 2023-04-11 DIAGNOSIS — K20.80: ICD-10-CM

## 2023-04-11 DIAGNOSIS — Z94.0: ICD-10-CM

## 2023-04-11 DIAGNOSIS — I10: ICD-10-CM

## 2023-05-08 ENCOUNTER — DOCUMENTATION ONLY (OUTPATIENT)
Dept: TRANSPLANT | Facility: CLINIC | Age: 58
End: 2023-05-08

## 2023-05-08 NOTE — PROGRESS NOTES
[facility] DATA REPORTING EPISODE CLEAN UP:  Kidney and Panreas episodes tracking updated to NOT FOLLOWED, in accordance with no clinical follow up - import from [facility].

## 2023-06-21 ENCOUNTER — TELEPHONE (OUTPATIENT)
Dept: TRANSPLANT | Facility: CLINIC | Age: 58
End: 2023-06-21

## 2023-06-21 NOTE — TELEPHONE ENCOUNTER
[facility] calling in regards of Script sent over in May for Patients Gengraf 25 mg needing new script sent unable to read it fax number is [phone]

## 2023-08-31 ENCOUNTER — HOSPITAL ENCOUNTER (OUTPATIENT)
Dept: HOSPITAL 54 - MRI | Age: 58
Discharge: HOME | End: 2023-08-31
Attending: ANESTHESIOLOGY
Payer: MEDICARE
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
## 2023-08-31 DIAGNOSIS — I67.82: ICD-10-CM
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
## 2023-08-31 DIAGNOSIS — S09.90XA: Primary | ICD-10-CM
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
## 2023-08-31 DIAGNOSIS — R90.82: ICD-10-CM
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
## 2023-09-12 ENCOUNTER — HOSPITAL ENCOUNTER (OUTPATIENT)
Dept: HOSPITAL 54 - MSC | Age: 58
Discharge: HOME | End: 2023-09-12
Attending: ANESTHESIOLOGY
Payer: MEDICARE

## 2023-09-12 DIAGNOSIS — Z94.0: ICD-10-CM

## 2023-09-12 DIAGNOSIS — M54.50: ICD-10-CM

## 2023-09-12 DIAGNOSIS — S22.079D: Primary | ICD-10-CM

## 2023-09-12 DIAGNOSIS — M62.830: ICD-10-CM

## 2023-09-12 DIAGNOSIS — H93.11: ICD-10-CM

## 2023-09-12 DIAGNOSIS — Z79.891: ICD-10-CM

## 2023-09-12 DIAGNOSIS — Z94.83: ICD-10-CM

## 2023-09-12 DIAGNOSIS — Z79.899: ICD-10-CM

## 2023-09-12 DIAGNOSIS — M40.299: ICD-10-CM
